# Patient Record
Sex: FEMALE | Race: BLACK OR AFRICAN AMERICAN | Employment: FULL TIME | URBAN - METROPOLITAN AREA
[De-identification: names, ages, dates, MRNs, and addresses within clinical notes are randomized per-mention and may not be internally consistent; named-entity substitution may affect disease eponyms.]

---

## 2018-05-22 DIAGNOSIS — I10 ESSENTIAL HYPERTENSION: ICD-10-CM

## 2018-05-22 RX ORDER — CHLORTHALIDONE 25 MG/1
25 TABLET ORAL DAILY
Qty: 30 TABLET | Refills: 3 | Status: SHIPPED | OUTPATIENT
Start: 2018-05-22 | End: 2018-12-05 | Stop reason: SDUPTHER

## 2018-12-05 DIAGNOSIS — I10 ESSENTIAL HYPERTENSION: ICD-10-CM

## 2018-12-05 RX ORDER — CHLORTHALIDONE 25 MG/1
25 TABLET ORAL DAILY
Qty: 30 TABLET | Refills: 11 | Status: SHIPPED | OUTPATIENT
Start: 2018-12-05 | End: 2019-12-30 | Stop reason: SDUPTHER

## 2019-05-23 RX ORDER — METOPROLOL TARTRATE 50 MG/1
50 TABLET, FILM COATED ORAL 2 TIMES DAILY
Qty: 180 TABLET | Refills: 3 | Status: SHIPPED
Start: 2019-05-23 | End: 2021-02-18 | Stop reason: SDUPTHER

## 2019-12-30 DIAGNOSIS — I10 ESSENTIAL HYPERTENSION: ICD-10-CM

## 2019-12-30 RX ORDER — CHLORTHALIDONE 25 MG/1
25 TABLET ORAL DAILY
Qty: 30 TABLET | Refills: 0 | Status: SHIPPED
Start: 2019-12-30 | End: 2020-03-12 | Stop reason: SDUPTHER

## 2020-03-12 RX ORDER — CHLORTHALIDONE 25 MG/1
25 TABLET ORAL DAILY
Qty: 30 TABLET | Refills: 3 | Status: SHIPPED
Start: 2020-03-12 | End: 2020-10-29 | Stop reason: SDUPTHER

## 2020-10-20 ENCOUNTER — TELEPHONE (OUTPATIENT)
Dept: ADMINISTRATIVE | Age: 50
End: 2020-10-20

## 2020-10-20 NOTE — TELEPHONE ENCOUNTER
Patient called for her refills but I checked and she hasn't been seen since July of 2017. I talked to [place] at [facility]. Patient needs scheduled an office visit per [place]. I lost the patient and tried calling her back. I left a message on the voicemail for her.

## 2020-10-29 RX ORDER — CHLORTHALIDONE 25 MG/1
25 TABLET ORAL DAILY
Qty: 30 TABLET | Refills: 3 | Status: SHIPPED
Start: 2020-10-29 | End: 2021-02-18 | Stop reason: SDUPTHER

## 2021-02-05 ENCOUNTER — OFFICE VISIT (OUTPATIENT)
Dept: PRIMARY CARE CLINIC | Age: 51
End: 2021-02-05
Payer: COMMERCIAL

## 2021-02-05 VITALS
OXYGEN SATURATION: 100 % | HEIGHT: 67 IN | HEART RATE: 113 BPM | DIASTOLIC BLOOD PRESSURE: 86 MMHG | BODY MASS INDEX: 26.68 KG/M2 | TEMPERATURE: 100.4 F | SYSTOLIC BLOOD PRESSURE: 138 MMHG | WEIGHT: 170 LBS

## 2021-02-05 DIAGNOSIS — U07.1 COVID-19: Primary | ICD-10-CM

## 2021-02-05 LAB
Lab: ABNORMAL
QC PASS/FAIL: ABNORMAL
SARS-COV-2, POC: DETECTED

## 2021-02-05 PROCEDURE — 99213 OFFICE O/P EST LOW 20 MIN: CPT | Performed by: PHYSICIAN ASSISTANT

## 2021-02-05 PROCEDURE — 87426 SARSCOV CORONAVIRUS AG IA: CPT | Performed by: PHYSICIAN ASSISTANT

## 2021-02-05 RX ORDER — ALBUTEROL SULFATE 90 UG/1
2 AEROSOL, METERED RESPIRATORY (INHALATION)
Qty: 1 INHALER | Refills: 0 | Status: SHIPPED
Start: 2021-02-05 | End: 2021-09-17 | Stop reason: SDUPTHER

## 2021-02-05 RX ORDER — PREDNISONE 20 MG/1
20 TABLET ORAL 2 TIMES DAILY
Qty: 10 TABLET | Refills: 0 | Status: SHIPPED | OUTPATIENT
Start: 2021-02-05 | End: 2021-02-10

## 2021-02-05 RX ORDER — AZITHROMYCIN 250 MG/1
TABLET, FILM COATED ORAL
Qty: 6 TABLET | Refills: 0 | Status: SHIPPED
Start: 2021-02-05 | End: 2021-02-24 | Stop reason: ALTCHOICE

## 2021-02-05 NOTE — LETTER
[address] [place] [address]  [place], [address]   Phone: [phone]  Fax: [phone]    Hetal Norris. Oscar Dodge PA-C      2/5/2021     Patient: Cain Hilliard   YOB: 1970       To Whom It May Concern: It is my medical opinion that Cain Hilliard should self-quarantine away from work and the [facility] [employer] as they tested positive for COVID-19 in our office today. Return to work with no retesting should be followed if meets these 3 criteria as outlined by CDC/ODH:     a. No fever without the use of fever reducers for 24 hours  b. Improvement in symptoms  c. At least 10 days since the onset of symptoms (2/13). If you have any questions or concerns, please don't hesitate to call. Sincerely,        Hetal Norris.  BRADLEY Orta

## 2021-02-05 NOTE — PROGRESS NOTES
Chief Complaint   Cough (x 3 days, works on [place] unit), Fever (high of 100.9), Generalized Body Aches, and Shortness of Breath (taking Coricidin at home)      History of Present Illness   Source of history provided by: patient. Nancy Medellin is a 48 y.o. old female who has a past medical history of:   Past Medical History:   Diagnosis Date    Hypertension    Presents to the walk in clinic for evaluation of nonproductive cough, fever (high of 100.9), generalized body aches, intermittent shortness of breath x3 days. Patient is mostly concerned because she is a [employer] and works on the [employer]. Patient has been taking Coricidin at home with minimal symptomatic relief. Denies any loss of taste or smell, CP, dyspnea, LE edema, abdominal pain, vomiting, rash, or lethargy. Denies any hx of asthma or COPD. Patient is a former smoker. ROS   Pertinent positives and negatives are stated within HPI, all other systems reviewed and are negative. Surgical History:  has no past surgical history on file. Social History:  reports that she has quit smoking. Her smoking use included cigarettes. She has a 2.50 pack-year smoking history. She has never used smokeless tobacco. She reports current alcohol use. She reports that she does not use drugs. Family History: family history includes Stroke in her father. Allergies: Penicillins    Physical Exam      VS:  /86   Pulse 113   Temp 100.4 °F (38 °C)   Ht 5' 6.5\" (1.689 m)   Wt 170 lb (77.1 kg)   SpO2 100%   BMI 27.03 kg/m²    Oxygen Saturation Interpretation: Normal.    Constitutional:  Alert, development consistent with age. NAD. Patient is ill-appearing but nontoxic appearing on exam.  Head:  NC/NT. Airway patent. Mouth: Posterior pharynx with mild erythema and clear postnasal drip. No tonsillar hypertrophy or exudate. Neck:  Normal ROM. Supple. No anterior cervical adenopathy noted.   Lungs: Faint end expiratory wheezes heard throughout the lung fields. No rales or rhonchi. Cough is harsh and wheezy. Patient is breathing comfortably on exam without any signs of respiratory distress noted. CV:  Regular rate and rhythm, normal heart sounds, without pathological murmurs, ectopy, gallops, or rubs. Skin:  Normal turgor. Warm, dry, without visible rash. Lymphatic: No lymphangitis or adenopathy noted. Neurological:  Oriented. Motor functions intact. Lab / Imaging Results   (All laboratory and radiology results have been personally reviewed by myself)  Labs:  Results for orders placed or performed in visit on 02/05/21   POCT COVID-19, Antigen   Result Value Ref Range    SARS-COV-2, POC Detected (A) Not Detected    Lot Number [id number]     QC Pass/Fail pass        Imaging: All Radiology results interpreted by Radiologist unless otherwise noted. No results found. Medical Decision Making   Pt non-toxic, in no apparent distress and stable at time of discharge. Assessment/Plan   Sushma was seen today for cough, fever, generalized body aches and shortness of breath. Diagnoses and all orders for this visit:    COVID-19  -     POCT COVID-19, Antigen  -     predniSONE (DELTASONE) 20 MG tablet; Take 1 tablet by mouth 2 times daily for 5 days  -     albuterol sulfate HFA (VENTOLIN HFA) 108 (90 Base) MCG/ACT inhaler; Inhale 2 puffs into the lungs every 4-6 hours as needed for Wheezing or Shortness of Breath  -     azithromycin (ZITHROMAX Z-MAYURI) 250 MG tablet; Take 2 tabs on day one, then 1 tab daily for the next 4 days      Rapid COVID-19 testing is positive in office. Advised strict 10-day quarantine from start of illness. Pt should remain out of work and the general public for at least 10 days from the start of symptoms. Pt should also be fever free for 24 hours and symptoms should be improved overall prior to returning.  Increase fluids and rest.  Prescription written for a prednisone burst, Ventolin, and Zithromax, side effects discussed. Symptomatic relief discussed including Tylenol prn pain/fever. Schedule virtual f/u with PCP in 7-10 days if symptoms persist. ED sooner if symptoms worsen or change. ED immediately with high or refractory fever, progressive SOB, dyspnea, CP, calf pain/swelling, shaking chills, vomiting, abdominal pain, lethargy, flank pain, or decreased urinary output. Pt verbalizes understanding and is in agreement with plan of care. All questions answered. Kaitlynn Orta PA-C    This visit was provided as a focused evaluation during the COVID -19 pandemic/national emergency. A comprehensive review of all previous patient history and testing was not conducted. Pertinent findings were elicited during the visit.

## 2021-02-18 ENCOUNTER — VIRTUAL VISIT (OUTPATIENT)
Dept: FAMILY MEDICINE CLINIC | Age: 51
End: 2021-02-18
Payer: COMMERCIAL

## 2021-02-18 DIAGNOSIS — U07.1 COVID-19: Primary | ICD-10-CM

## 2021-02-18 DIAGNOSIS — I10 ESSENTIAL HYPERTENSION: ICD-10-CM

## 2021-02-18 PROCEDURE — 99213 OFFICE O/P EST LOW 20 MIN: CPT | Performed by: FAMILY MEDICINE

## 2021-02-18 RX ORDER — CHLORTHALIDONE 25 MG/1
25 TABLET ORAL DAILY
Qty: 30 TABLET | Refills: 3 | Status: ON HOLD
Start: 2021-02-18 | End: 2021-03-09 | Stop reason: HOSPADM

## 2021-02-18 RX ORDER — METOPROLOL TARTRATE 50 MG/1
50 TABLET, FILM COATED ORAL 2 TIMES DAILY
Qty: 180 TABLET | Refills: 3 | Status: SHIPPED
Start: 2021-02-18 | End: 2021-09-17 | Stop reason: SDUPTHER

## 2021-02-18 NOTE — PROGRESS NOTES
TeleMedicine Patient Consent    This visit was performed as a virtual video visit using a synchronous, two-way, audio-video telehealth technology platform. Patient identification was verified at the start of the visit, including the patient's telephone number and physical location. I discussed with the patient the nature of our telehealth visits, that:     1. Due to the nature of an audio- video modality, the only components of a physical exam that could be done are the elements supported by direct observation. 2. The provider will evaluate the patient and recommend diagnostics and treatments based on their assessment. 3. If it was felt that the patient should be evaluated in clinic or an emergency room setting, then they would be directed there. 4. Our sessions are not being recorded and that personal health information is protected. 5. Our team would provide follow up care in person if/when the patient needs it. 6. As a TeleMedicine visit, this encounter will generate charges, similar to office visits, which may or may not be fully paid by the insurance, so the patient may be financially responsible for this encounter. Patient does agree to proceed with telemedicine consultation. Patient's location: home address in [place]    This encounter began at:  1010    This encounter ended at:  1020    This visit was performed during the 3281 public health crisis and COVID-19 pandemic. *Add 95 modifier to all Video Visits*    CC:  COVID    HPI:  48 y.o. female presents for above cc. Tested positive 2/5. Feeling improved. Still having cough and shortness of breath at home. Has pulse ox and has been reading in low 90s usually did have one 89. Using inhaler which helps symptoms. Fatigue still there, fevers gone now. HTN: supposed to be on chlorthalidone and metoprolol. Been out of metoprolol few weeks. Does not have a cuff. No labs since 2018.     Patient Active Problem List    Diagnosis Date Noted    Venous angioma 03/17/2011    Left arm weakness 03/17/2011    HTN (hypertension) 03/10/2011    Shin splints 03/10/2011       Current Outpatient Medications on File Prior to Visit   Medication Sig Dispense Refill    albuterol sulfate HFA (VENTOLIN HFA) 108 (90 Base) MCG/ACT inhaler Inhale 2 puffs into the lungs every 4-6 hours as needed for Wheezing or Shortness of Breath 1 Inhaler 0    chlorthalidone (HYGROTON) 25 MG tablet Take 1 tablet by mouth daily 30 tablet 3    azithromycin (ZITHROMAX Z-MAYURI) 250 MG tablet Take 2 tabs on day one, then 1 tab daily for the next 4 days (Patient not taking: Reported on 2/18/2021) 6 tablet 0    metoprolol tartrate (LOPRESSOR) 50 MG tablet Take 1 tablet by mouth 2 times daily 180 tablet 3     No current facility-administered medications on file prior to visit. Allergies   Allergen Reactions    Penicillins Hives and Itching       Social History     Tobacco Use    Smoking status: Former Smoker     Packs/day: 0.50     Years: 5.00     Pack years: 2.50     Types: Cigarettes    Smokeless tobacco: Never Used   Substance Use Topics    Alcohol use: Yes     Comment: very socially beer    Drug use: No       ROS:   As above    Physical Exam:    General: fatigued  Skin: no lesions  Lungs: no resp distress, speaking in full sentences  Psych: normal mood/affect    Most recent labs and imaging reviewed. Findings include: none    Assessments:     COVID, HTN    Plans:  Go to flu clinic for eval as sx have been persistent. Concern for PNA. Refill BP meds, needs labs once able to come in and have in-person appt here. Orders as above. RTO when recovered from [address] [place] [address] to please be adherent to the treatment plans discussed today, and please call with any questions or concerns, letting the office know of any reasons that the plans may not be followed. The risks of untreated conditions include worsening illness, injury, disability, and possibly, death. Please call if symptoms change in any way, worsen, or fail to completely resolve, as this could necessitate a change to treatment plans. Patient and/or caregiver expressed understanding. Indications and proper use of medication(s) reviewed. Potential side-effects and risks of medication(s) also explained. Patient and/or caregiver was instructed to call if any new symptoms develop prior to next visit. This visit was performed during the 0332 public health crisis and COVID-19 pandemic.   *Add 95 modifier to all Video Visits*

## 2021-02-18 NOTE — PROGRESS NOTES
Attending Physician Statement    S:   Chief Complaint   Patient presents with    Positive For Covid-19    Cough    Shortness of Breath    Fatigue    Headache      Patient is a 48year old female here for follow up of covid. Tested positive on Feb 5. Overall improving but continues to have cough, shortness of breath and fatigue. Has pulse ox and had one reading of 89% but since then low 90s. No history of pulmonary issues. No longer having fevers. Still has chlorthalidone. Was out of metoprolol. Does not have blood pressure machine. Has not had blood work since 2018. O: not currently breastfeeding. Exam:   gen - appears fatigued. Lungs -speaking in full sentences no apparent respiratory distress. A: covid 19 , htn   P:  Check labs    Refill meds    Call employee health   Referral to flu clinic to assess for pneumonia    Follow-up as ordered    Attending Attestation   I have discussed the case, including pertinent history and exam findings with the resident. I agree with the documented assessment and plan.

## 2021-02-24 ENCOUNTER — TELEPHONE (OUTPATIENT)
Dept: FAMILY MEDICINE CLINIC | Age: 51
End: 2021-02-24

## 2021-02-24 ENCOUNTER — OFFICE VISIT (OUTPATIENT)
Dept: PRIMARY CARE CLINIC | Age: 51
End: 2021-02-24
Payer: COMMERCIAL

## 2021-02-24 VITALS
DIASTOLIC BLOOD PRESSURE: 84 MMHG | TEMPERATURE: 99.7 F | SYSTOLIC BLOOD PRESSURE: 132 MMHG | BODY MASS INDEX: 26.68 KG/M2 | HEIGHT: 67 IN | OXYGEN SATURATION: 97 % | HEART RATE: 78 BPM | WEIGHT: 170 LBS

## 2021-02-24 DIAGNOSIS — U07.1 COVID-19: Primary | ICD-10-CM

## 2021-02-24 DIAGNOSIS — R06.2 WHEEZING: ICD-10-CM

## 2021-02-24 DIAGNOSIS — R05.9 COUGH: ICD-10-CM

## 2021-02-24 DIAGNOSIS — R06.02 SOB (SHORTNESS OF BREATH): ICD-10-CM

## 2021-02-24 PROCEDURE — 99213 OFFICE O/P EST LOW 20 MIN: CPT | Performed by: NURSE PRACTITIONER

## 2021-02-24 RX ORDER — AZITHROMYCIN 250 MG/1
250 TABLET, FILM COATED ORAL SEE ADMIN INSTRUCTIONS
Qty: 6 TABLET | Refills: 0 | Status: SHIPPED | OUTPATIENT
Start: 2021-02-24 | End: 2021-03-01

## 2021-02-24 NOTE — PROGRESS NOTES
Chief Complaint   Cough (Started almost a month ago. Covid positive ) and Fatigue      History of Present Illness   Source of history provided by:  patient. Cynthia Lan is a 48 y.o. old female who presents to the flu clinic with complaints of NP Cough and Shortness of breath x 14 days. States symptoms have stayed the same since onset. Has been taking none without symptomatic relief. Denies any Shortness of breath. Denies any hx of no history of pneumonia or bronchitis. ROS   Pertinent positives and negatives are stated within HPI, all other systems reviewed and are negative. Past Medical History:  has a past medical history of Hypertension. Past Surgical History:  has no past surgical history on file. Social History:  reports that she has quit smoking. Her smoking use included cigarettes. She has a 2.50 pack-year smoking history. She has never used smokeless tobacco. She reports current alcohol use. She reports that she does not use drugs. Family History: family history includes Stroke in her father. Allergies: Penicillins    Physical Exam   Vital Signs:  /84   Pulse 78   Temp 99.7 °F (37.6 °C)   Ht 5' 6.5\" (1.689 m)   Wt 170 lb (77.1 kg)   SpO2 97%   BMI 27.03 kg/m²    Oxygen Saturation Interpretation: Normal.    Constitutional:  Alert, development consistent with age. NAD. Head:  NC/NT. Airway patent. Ears: TMs Clear bilaterally. Canals without exudate or swelling bilaterally. Mouth: Posterior pharynx with mild erythema and clear postnasal drip. no tonsillar hypertrophy or exudate. Neck:  Normal ROM. Supple. no anterior cervical adenopathy noted. Lungs:  Wheezes, and  rales, no rhonchi. CV:  Regular rate and rhythm, normal heart sounds, without pathological murmurs, ectopy, gallops, or rubs. Skin:  Normal turgor. Warm, dry, without visible rash. Lymphatic: No lymphangitis or adenopathy noted. Neurological:  Oriented. Motor functions intact.     Lab / Imaging Results (All laboratory and radiology results have been personally reviewed by myself)  Labs:  No results found for this visit on 02/24/21. Imaging: All Radiology results interpreted by Radiologist unless otherwise noted. No results found. Medical Decision Making   Pt non-toxic, in no apparent distress and stable at time of discharge. Assessment/Plan   There are no diagnoses linked to this encounter. I will send out for a chest x-ray she has already had a virtual visit with her primary care provider who she thought was going to order chest x-ray due to her continued shortness of breath after the positive Covid earlier in the month. There does not seem to be any evidence of a PE chest pain or DVT appreciated during the visit. Vane Pham, APRN - CNP    This visit was provided as a focused evaluation during the COVID -19 pandemic/national emergency. A comprehensive review of all previous patient history and testing was not conducted. Pertinent findings were elicited during the visit. *NOTE: This report was transcribed using voice recognition software. Every effort was made to ensure accuracy; however, inadvertent computerized transcription errors may be present.

## 2021-02-24 NOTE — TELEPHONE ENCOUNTER
The patient called and would you to call her. She feels she was not instructed properly on to go to be assessed at flu clinic during her virtual visit. She went to get a chest x ray today as she thought that is what was advised during virtual visit but there was no order in Epic. I read the virtual visit note, and advised her to go to [facility] to be re evaluated per your virtual note, as you are concerned for pneumonia.   Patient verbalized understanding and will go to [facility]

## 2021-02-24 NOTE — TELEPHONE ENCOUNTER
Pt was advised during encounter to go to [facility]) for further evaluation due to persistence of symptoms.  Agree with referring back to flu clinic today thank you

## 2021-02-24 NOTE — PATIENT INSTRUCTIONS
Patient Education        Learning About Coronavirus [phone])  What is coronavirus (COVID-19)? COVID-19 is a disease caused by a new type of coronavirus. This illness was first found in December 2019. It has since spread worldwide. Coronaviruses are a large group of viruses. They cause the common cold. They also cause more serious illnesses like [place] respiratory syndrome (MERS) and severe acute respiratory syndrome (SARS). COVID-19 is caused by a novel coronavirus. That means it's a new type that has not been seen in people before. What are the symptoms? Coronavirus (COVID-19) symptoms may include:  · Fever. · Cough. · Trouble breathing. · Chills or repeated shaking with chills. · Muscle pain. · Headache. · Sore throat. · New loss of taste or smell. · Vomiting. · Diarrhea. In severe cases, COVID-19 can cause pneumonia and make it hard to breathe without help from a machine. It can cause death. How is it diagnosed? COVID-19 is diagnosed with a viral test. This may also be called a PCR test or antigen test. It looks for evidence of the virus in your breathing passages or lungs (respiratory system). The test is most often done on a sample from the nose, throat, or lungs. It's sometimes done on a sample of saliva. One way a sample is collected is by putting a long swab into the back of your nose. How is it treated? Mild cases of COVID-19 can be treated at home. Serious cases need treatment in the hospital. Treatment may include medicines to reduce symptoms, plus breathing support such as oxygen therapy or a ventilator. Some people may be placed on their belly to help their oxygen levels. Treatments that may help people who have COVID-19 include:  Antiviral medicines. These medicines treat viral infections. Remdesivir is an example. Immune-based therapy. These medicines help the immune system fight COVID-19. One example is bamlanivimab. It's a monoclonal antibody. Blood thinners. have severe trouble breathing. (You can't talk at all.)     · You have constant chest pain or pressure.     · You are severely dizzy or lightheaded.     · You are confused or can't think clearly.     · Your face and lips have a blue color.     · You pass out (lose consciousness) or are very hard to wake up. Call your doctor now or seek immediate medical care if:    · You have moderate trouble breathing. (You can't speak a full sentence.)     · You are coughing up blood (more than about 1 teaspoon).     · You have signs of low blood pressure. These include feeling lightheaded; being too weak to stand; and having cold, pale, clammy skin. Watch closely for changes in your health, and be sure to contact your doctor if:    · Your symptoms get worse.     · You are not getting better as expected. Call before you go to the doctor's office. Follow their instructions. And wear a cloth face cover. Current as of: December 18, 2020               Content Version: 12.7  © 2006-2021 [employer]. Care instructions adapted under license by [facility] ([facility]). If you have questions about a medical condition or this instruction, always ask your healthcare professional. [place] [address] any warranty or liability for your use of this information. Patient Education        Coronavirus ([id number]-63): Care Instructions  Overview  The coronavirus disease (COVID-19) is caused by a virus. Symptoms may include a fever, a cough, and shortness of breath. It mainly spreads person-to-person through droplets from coughing and sneezing. The virus also can spread when people are in close contact with someone who is infected. Most people have mild symptoms and can take care of themselves at home. If their symptoms get worse, they may need care in a hospital. Treatment may include medicines to reduce symptoms, plus breathing support such as oxygen therapy or a ventilator.   It's important to not spread the virus to others. If you have COVID-19, wear a face cover anytime you are around other people. You need to isolate yourself while you are sick. Leave your home only if you need to get medical care or testing. Follow-up care is a key part of your treatment and safety. Be sure to make and go to all appointments, and call your doctor if you are having problems. It's also a good idea to know your test results and keep a list of the medicines you take. How can you care for yourself at home? · Get extra rest. It can help you feel better. · Drink plenty of fluids. This helps replace fluids lost from fever. Fluids also help ease a scratchy throat. Water, soup, fruit juice, and hot tea with lemon are good choices. · Take acetaminophen (such as Tylenol) to reduce a fever. It may also help with muscle aches. Read and follow all instructions on the label. · Use petroleum jelly on sore skin. This can help if the skin around your nose and lips becomes sore from rubbing a lot with tissues. Tips for self-isolation  · Limit contact with people in your home. If possible, stay in a separate bedroom and use a separate bathroom. · Wear a cloth face cover when you are around other people. It can help stop the spread of the virus when you cough or sneeze. · If you have to leave home, avoid crowds and try to stay at least 6 feet away from other people. · Avoid contact with pets and other animals. · Cover your mouth and nose with a tissue when you cough or sneeze. Then throw it in the trash right away. · Wash your hands often, especially after you cough or sneeze. Use soap and water, and scrub for at least 20 seconds. If soap and water aren't available, use an alcohol-based hand . · Don't share personal household items. These include bedding, towels, cups and glasses, and eating utensils. · [address] in the warmest water allowed for the fabric type, and dry it completely. It's okay to wash other people's laundry with yours. · Clean and disinfect your home every day. Use household  and disinfectant wipes or sprays. Take special care to clean things that you grab with your hands. These include doorknobs, remote controls, phones, and handles on your refrigerator and microwave. And don't forget countertops, tabletops, bathrooms, and computer keyboards. When you can end self-isolation  · If you know or suspect that you have COVID-19, stay in self-isolation until:  ? You haven't had a fever for 24 hours while not taking medicines to lower the fever, and  ? Your symptoms have improved, and  ? It's been at least 10 days since your symptoms started. · Talk to your doctor about whether you also need testing, especially if you have a weakened immune system. When should you call for help? Call 911 anytime you think you may need emergency care. For example, call if you have life-threatening symptoms, such as:    · You have severe trouble breathing. (You can't talk at all.)     · You have constant chest pain or pressure.     · You are severely dizzy or lightheaded.     · You are confused or can't think clearly.     · Your face and lips have a blue color.     · You pass out (lose consciousness) or are very hard to wake up. Call your doctor now or seek immediate medical care if:    · You have moderate trouble breathing. (You can't speak a full sentence.)     · You are coughing up blood (more than about 1 teaspoon).     · You have signs of low blood pressure. These include feeling lightheaded; being too weak to stand; and having cold, pale, clammy skin. Watch closely for changes in your health, and be sure to contact your doctor if:    · Your symptoms get worse.     · You are not getting better as expected. Call before you go to the doctor's office. Follow their instructions. And wear a cloth face cover. Current as of: December 18, 2020               Content Version: 12.7  © 3978-8083 [employer].    Care instructions adapted under license by [facility] ([facility]). If you have questions about a medical condition or this instruction, always ask your healthcare professional. [address] any warranty or liability for your use of this information. Patient Education        Learning About COVID-19 and Social Distancing  What is it? Social distancing means putting space between yourself and other people. The recommended distance is 6 feet, or about 2 meters. This also means staying away from any place where people may gather, such as pro or other public gathering places. Why is it important? Social distancing is the best way to reduce the spread of COVID-19. This virus seems to spread from person to person through droplets from coughing and sneezing. So if you keep your distance from others, you're less likely to get it or spread it. And social distancing is important for everyone, not just those who are at high risk of infection, like older people. You might have the virus but not have symptoms. You could then give the infection to someone you come into contact with. How is it done? Putting 6 feet, or about 2 meters, between you and other people is the recommended distance. Also stay away from any place where people may gather, such as pro or other public gathering places. So if possible:  · Work from home, and keep your kids at home. · Don't travel if you don't have to. And avoid public transportation, ride-shares, and taxis unless you have no choice. · Limit shopping to essentials, like food and medicines. · Wear a cloth face cover if you have to go to a public place like the grocery store or pharmacy. · Don't eat in restaurants. (You can still get takeout or food deliveries.)  · Avoid crowds and busy places. Follow stay-at-home orders or other directions for your area. Where can you learn more? Go to https://chpaveleb.healthMang?rKart. org and sign in to your [employer] account.  Enter A133 in the Search Health Information box to learn more about \"Learning About COVID-19 and Social Distancing. \"     If you do not have an account, please click on the \"Sign Up Now\" link. Current as of: December 18, 2020               Content Version: 12.7  © 6590-7417 Healthwise, [place]. Care instructions adapted under license by [facility] ([facility]). If you have questions about a medical condition or this instruction, always ask your healthcare professional. [place] [address] any warranty or liability for your use of this information. Patient Education        Cough: Care Instructions  Your Care Instructions     A cough is your body's response to something that bothers your throat or airways. Many things can cause a cough. You might cough because of a cold or the flu, bronchitis, or asthma. Smoking, postnasal drip, allergies, and stomach acid that backs up into your throat also can cause coughs. A cough is a symptom, not a disease. Most coughs stop when the cause, such as a cold, goes away. You can take a few steps at home to cough less and feel better. Follow-up care is a key part of your treatment and safety. Be sure to make and go to all appointments, and call your doctor if you are having problems. It's also a good idea to know your test results and keep a list of the medicines you take. How can you care for yourself at home? · Drink lots of water and other fluids. This helps thin the mucus and soothes a dry or sore throat. Honey or lemon juice in hot water or tea may ease a dry cough. · Take cough medicine as directed by your doctor. · Prop up your head on pillows to help you breathe and ease a dry cough. · Try cough drops to soothe a dry or sore throat. Cough drops don't stop a cough. Medicine-flavored cough drops are no better than candy-flavored drops or hard candy. · Do not smoke. Avoid secondhand smoke.  If you need help quitting, talk to your doctor about stop-smoking programs and medicines. These can increase your chances of quitting for good. When should you call for help? Call 911 anytime you think you may need emergency care. For example, call if:    · You have severe trouble breathing. Call your doctor now or seek immediate medical care if:    · You cough up blood.     · You have new or worse trouble breathing.     · You have a new or higher fever.     · You have a new rash. Watch closely for changes in your health, and be sure to contact your doctor if:    · You cough more deeply or more often, especially if you notice more mucus or a change in the color of your mucus.     · You have new symptoms, such as a sore throat, an earache, or sinus pain.     · You do not get better as expected. Where can you learn more? Go to https://[employer]. org and sign in to your Jibestream account. Enter D279 in the CumuLogic box to learn more about \"Cough: Care Instructions. \"     If you do not have an account, please click on the \"Sign Up Now\" link. Current as of: February 24, 2020               Content Version: 12.6  © 5256-5253 [employer]. Care instructions adapted under license by [facility] ([facility]). If you have questions about a medical condition or this instruction, always ask your healthcare professional. [place] [address] any warranty or liability for your use of this information. Patient Education        Wheezing or Bronchoconstriction: Care Instructions  Your Care Instructions  Wheezing is a whistling noise made during breathing. It occurs when the small airways, or bronchial tubes, that lead to your lungs swell or contract (spasm) and become narrow. This narrowing is called bronchoconstriction. When your airways constrict, it is hard for air to pass through and this makes it hard for you to breathe. Wheezing and bronchoconstriction can be caused by many problems, including:  · An infection such as the flu or a cold.   · Allergies such as hay fever. · Diseases such as asthma or chronic obstructive pulmonary disease. · Smoking. Treatment for your wheezing depends on what is causing the problem. Your wheezing may get better without treatment. But you may need to pay attention to things that cause your wheezing and avoid them. Or you may need medicine to help treat the wheezing and to reduce the swelling or to relieve spasms in your lungs. Follow-up care is a key part of your treatment and safety. Be sure to make and go to all appointments, and call your doctor if you are having problems. It is also a good idea to know your test results and keep a list of the medicines you take. How can you care for yourself at home? · Take your medicine exactly as prescribed. Call your doctor if you think you are having a problem with your medicine. You will get more details on the specific medicine your doctor prescribes. · If your doctor prescribed antibiotics, take them as directed. Do not stop taking them just because you feel better. You need to take the full course of antibiotics. · Breathe moist air from a humidifier, hot shower, or sink filled with hot water. This may help ease your symptoms and make it easier for you to breathe. · If you have congestion in your nose and throat, drinking plenty of fluids, especially hot fluids, may help relieve your symptoms. If you have kidney, heart, or liver disease and have to limit fluids, talk with your doctor before you increase the amount of fluids you drink. · If you have mucus in your airways, it may help to breathe deeply and cough. · Do not smoke or allow others to smoke around you. Smoking can make your wheezing worse. If you need help quitting, talk to your doctor about stop-smoking programs and medicines. These can increase your chances of quitting for good. · Avoid things that may cause your wheezing.  These may include colds, smoke, air pollution, dust, pollen, pets, cockroaches,

## 2021-02-25 ENCOUNTER — HOSPITAL ENCOUNTER (OUTPATIENT)
Age: 51
Discharge: HOME | End: 2021-02-27
Payer: COMMERCIAL

## 2021-02-25 ENCOUNTER — HOSPITAL ENCOUNTER (OUTPATIENT)
Dept: GENERAL RADIOLOGY | Age: 51
Discharge: HOME | End: 2021-02-27
Payer: COMMERCIAL

## 2021-02-25 DIAGNOSIS — R06.2 WHEEZING: ICD-10-CM

## 2021-02-25 DIAGNOSIS — R06.02 SOB (SHORTNESS OF BREATH): ICD-10-CM

## 2021-02-25 DIAGNOSIS — R05.9 COUGH: ICD-10-CM

## 2021-02-25 DIAGNOSIS — U07.1 COVID-19: ICD-10-CM

## 2021-02-25 PROCEDURE — 71046 X-RAY EXAM CHEST 2 VIEWS: CPT

## 2021-03-02 ENCOUNTER — TELEPHONE (OUTPATIENT)
Dept: FAMILY MEDICINE CLINIC | Age: 51
End: 2021-03-02

## 2021-03-04 ENCOUNTER — OFFICE VISIT (OUTPATIENT)
Dept: PRIMARY CARE CLINIC | Age: 51
End: 2021-03-04
Payer: COMMERCIAL

## 2021-03-04 VITALS
OXYGEN SATURATION: 97 % | SYSTOLIC BLOOD PRESSURE: 160 MMHG | BODY MASS INDEX: 26.68 KG/M2 | DIASTOLIC BLOOD PRESSURE: 90 MMHG | WEIGHT: 170 LBS | TEMPERATURE: 97.5 F | HEART RATE: 87 BPM | HEIGHT: 67 IN

## 2021-03-04 DIAGNOSIS — U07.1 COVID-19: ICD-10-CM

## 2021-03-04 DIAGNOSIS — R05.8 POST-VIRAL COUGH SYNDROME: Primary | ICD-10-CM

## 2021-03-04 DIAGNOSIS — Z02.89 ENCOUNTER TO OBTAIN EXCUSE FROM WORK: ICD-10-CM

## 2021-03-04 PROCEDURE — 99213 OFFICE O/P EST LOW 20 MIN: CPT | Performed by: NURSE PRACTITIONER

## 2021-03-04 RX ORDER — BENZONATATE 100 MG/1
100-200 CAPSULE ORAL 3 TIMES DAILY PRN
Qty: 60 CAPSULE | Refills: 0 | Status: SHIPPED | OUTPATIENT
Start: 2021-03-04 | End: 2021-03-14

## 2021-03-04 NOTE — LETTER
[address] [facility] [place]  [address] [place] [address]  Phone: [phone]  Fax: [phone]    Hardeep Garrison, MEHUL Pinto NP        March 4, 2021     Patient: Dang Wheat   YOB: 1970   Date of Visit: 3/4/2021       To Whom it May Concern:    Dang Wheat was seen in my clinic on 3/4/2021. She may return to work on 03/08/2021. If you have any questions or concerns, please don't hesitate to call.     Sincerely,         Hardeep Garrison, MEHUL - NP

## 2021-03-04 NOTE — LETTER
[address] [place]  [address] [place] [address]  Phone: [phone]  Fax: [phone]    MEHUL Fang NP        March 4, 2021     Patient: Joselin Gibbons   YOB: 1970   Date of Visit: 3/4/2021       To Whom it May Concern:    Joselin Gibbons was seen in my clinic on 3/4/2021. She may return to work on 03/05/2021. If you have any questions or concerns, please don't hesitate to call.     Sincerely,         MEHUL Fang NP

## 2021-03-04 NOTE — PROGRESS NOTES
Chief Complaint   Other (release to work. POs covid test on 02/05/21), Cough, and Fatigue      History of Present Illness   Source of history provided by: patient. Manjula Posada is a 48 y.o. old female who presents to walk-in care for reevaluation of cough X 5 weeks. Associated symptoms include fatigue. All other COVID-19 symptoms have resolved. Since onset symptoms have been improving. She was diagnosed with COVID-19 on 2/5/2021. Patient was placed on an albuterol inhaler and prednisone and took the medication appropriately. Patient followed up in walk-in clinic on 2/24/2021 for return to work. At that time she had a chest x-ray done which was negative for any acute process. Patient returns today for a work slip given that her symptoms have improved. The patient is not vaccinated. Has used albuterol inhaler 2 times in the last 24 hours for symptomatic relief. . Denies any fever, chills, CP, dyspnea, LE edema, abdominal pain, vomiting, rash, or lethargy. Denies any hx of asthma, recurrent bronchitis, COPD, or pneumonia. Has no history of tobacco abuse. ROS   Pertinent positives and negatives are stated within HPI, all other systems reviewed and are negative. Past Medical History:  has a past medical history of Hypertension. Surgical History:  has no past surgical history on file. Social History:  reports that she has quit smoking. Her smoking use included cigarettes. She has a 2.50 pack-year smoking history. She has never used smokeless tobacco. She reports current alcohol use. She reports that she does not use drugs. Family History: family history includes Stroke in her father. Allergies: Penicillins    Physical Exam      VS:  BP (!) 160/90   Pulse 87   Temp 97.5 °F (36.4 °C)   Ht 5' 6.5\" (1.689 m)   Wt 170 lb (77.1 kg)   SpO2 97%   BMI 27.03 kg/m²    Oxygen Saturation Interpretation: Normal.    Constitutional:  Alert, development consistent with age. NAD. Head:  NC/NT.  Airway patent. No TTP over maxillary and frontal sinuses. Ears: Bilateral external auditory ear canals are clear there is no cerumen, erythema, debris present. Bilateral tympanic membranes intact, translucent, normal cone of light. Nose: Bilateral turbinates swollen. No septal deviation. Rhinorrhea present. Mouth: Posterior pharynx with mild erythema and clear postnasal drip. No tonsillar hypertrophy or exudate. Neck:  Normal ROM. Supple. No anterior cervical adenopathy noted. Lungs: CTAB without wheezes, rales, or rhonchi. CV:  Regular rate and rhythm, normal heart sounds, without pathological murmurs, ectopy, gallops, or rubs. Skin:  Normal turgor. Warm, dry, without visible rash. Lymphatic: No lymphangitis or adenopathy noted. Neurological:  Oriented. Motor functions intact. Lab / Imaging Results   (All laboratory and radiology results have been personally reviewed by myself)  Labs:  No results found for this visit on 03/04/21. Imaging: All Radiology results interpreted by Radiologist unless otherwise noted. EXAMINATION:   TWO XRAY VIEWS OF THE CHEST   2/25/2021 8:14 am   COMPARISON:   28 June 2017   HISTORY:   ORDERING SYSTEM PROVIDED HISTORY: COVID-19   TECHNOLOGIST PROVIDED HISTORY:   Reason for exam:->SOB without Chest Pain   What reading provider will be dictating this exam?->CRC   FINDINGS:   The lungs are without acute focal process.  There is no effusion or   pneumothorax. The cardiomediastinal silhouette is without acute process. The   osseous structures are without acute process.       Impression   No acute process. Medical Decision Making   Pt non-toxic, in no apparent distress and stable at time of discharge. Assessment/Plan   Sushma was seen today for other, cough and fatigue. Diagnoses and all orders for this visit:    Post-viral cough syndrome  -     benzonatate (TESSALON) 100 MG capsule;  Take 1-2 capsules by mouth 3 times daily as needed for Cough    COVID-19    Encounter to obtain excuse from work        For [employer] guidelines the patient quarantined appropriately and may discontinue quarantine at this time. Given that her symptoms have significantly improved patient may return to work on 3/8/2021 as tolerated. A work slip was provided to the patient today. Increase fluids and rest.  Advised patient to take zinc and vitamin C for immune support. Advised patient to continue taking albuterol inhaler as needed for shortness of breath, wheezing, cough. Prescription written for [facility] today, side effects and administration instructions were discussed. Other symptomatic relief discussed including Tylenol prn pain/fever. Schedule virtual f/u with PCP in 7-10 days if symptoms persist.  Go to ED sooner if symptoms worsen or change. ED immediately with high or refractory fever, progressive SOB, dyspnea, CP, calf pain/swelling, shaking chills, vomiting, abdominal pain, lethargy, flank pain, or decreased urinary output. Pt verbalizes understanding and is in agreement with plan of care. All questions answered. MEHUL Fuller NP    *NOTE: This report was transcribed using voice recognition software. Every effort was made to ensure accuracy; however, inadvertent computerized transcription errors may be present.

## 2021-03-08 ENCOUNTER — HOSPITAL ENCOUNTER (OUTPATIENT)
Age: 51
Setting detail: OBSERVATION
Discharge: HOME | End: 2021-03-09
Attending: EMERGENCY MEDICINE | Admitting: FAMILY MEDICINE
Payer: COMMERCIAL

## 2021-03-08 ENCOUNTER — APPOINTMENT (OUTPATIENT)
Dept: GENERAL RADIOLOGY | Age: 51
End: 2021-03-08
Payer: COMMERCIAL

## 2021-03-08 DIAGNOSIS — E83.42 HYPOMAGNESEMIA: ICD-10-CM

## 2021-03-08 DIAGNOSIS — R20.0 NUMBNESS: ICD-10-CM

## 2021-03-08 DIAGNOSIS — E87.6 HYPOKALEMIA: Primary | ICD-10-CM

## 2021-03-08 PROBLEM — N93.9 ABNORMAL UTERINE BLEEDING: Status: ACTIVE | Noted: 2021-03-08

## 2021-03-08 LAB
ALBUMIN SERPL-MCNC: 3.8 G/DL (ref 3.5–5.2)
ALP BLD-CCNC: 101 U/L (ref 35–104)
ALT SERPL-CCNC: 18 U/L (ref 0–32)
ANION GAP SERPL CALCULATED.3IONS-SCNC: 15 MMOL/L (ref 7–16)
AST SERPL-CCNC: 46 U/L (ref 0–31)
BASOPHILS ABSOLUTE: 0.05 E9/L (ref 0–0.2)
BASOPHILS RELATIVE PERCENT: 0.6 % (ref 0–2)
BILIRUB SERPL-MCNC: 0.7 MG/DL (ref 0–1.2)
BUN BLDV-MCNC: 5 MG/DL (ref 6–20)
CALCIUM SERPL-MCNC: 8 MG/DL (ref 8.6–10.2)
CHLORIDE BLD-SCNC: 93 MMOL/L (ref 98–107)
CO2: 26 MMOL/L (ref 22–29)
CREAT SERPL-MCNC: 0.6 MG/DL (ref 0.5–1)
EOSINOPHILS ABSOLUTE: 0.06 E9/L (ref 0.05–0.5)
EOSINOPHILS RELATIVE PERCENT: 0.7 % (ref 0–6)
GFR AFRICAN AMERICAN: >60
GFR NON-AFRICAN AMERICAN: >60 ML/MIN/1.73
GLUCOSE BLD-MCNC: 123 MG/DL (ref 74–99)
HCG QUALITATIVE: NEGATIVE
HCT VFR BLD CALC: 28 % (ref 34–48)
HEMOGLOBIN: 9.5 G/DL (ref 11.5–15.5)
IMMATURE GRANULOCYTES #: 0.05 E9/L
IMMATURE GRANULOCYTES %: 0.6 % (ref 0–5)
LYMPHOCYTES ABSOLUTE: 1.76 E9/L (ref 1.5–4)
LYMPHOCYTES RELATIVE PERCENT: 20.3 % (ref 20–42)
MAGNESIUM: 1.3 MG/DL (ref 1.6–2.6)
MCH RBC QN AUTO: 29.4 PG (ref 26–35)
MCHC RBC AUTO-ENTMCNC: 33.9 % (ref 32–34.5)
MCV RBC AUTO: 86.7 FL (ref 80–99.9)
MONOCYTES ABSOLUTE: 0.9 E9/L (ref 0.1–0.95)
MONOCYTES RELATIVE PERCENT: 10.4 % (ref 2–12)
NEUTROPHILS ABSOLUTE: 5.86 E9/L (ref 1.8–7.3)
NEUTROPHILS RELATIVE PERCENT: 67.4 % (ref 43–80)
PDW BLD-RTO: 15.9 FL (ref 11.5–15)
PLATELET # BLD: 295 E9/L (ref 130–450)
PMV BLD AUTO: 9 FL (ref 7–12)
POTASSIUM REFLEX MAGNESIUM: 2.7 MMOL/L (ref 3.5–5)
RBC # BLD: 3.23 E12/L (ref 3.5–5.5)
SODIUM BLD-SCNC: 134 MMOL/L (ref 132–146)
TOTAL PROTEIN: 7.5 G/DL (ref 6.4–8.3)
TROPONIN: <0.01 NG/ML (ref 0–0.03)
TSH SERPL DL<=0.05 MIU/L-ACNC: 2.59 UIU/ML (ref 0.27–4.2)
WBC # BLD: 8.7 E9/L (ref 4.5–11.5)

## 2021-03-08 PROCEDURE — 93005 ELECTROCARDIOGRAM TRACING: CPT | Performed by: PHYSICIAN ASSISTANT

## 2021-03-08 PROCEDURE — 85025 COMPLETE CBC W/AUTO DIFF WBC: CPT

## 2021-03-08 PROCEDURE — 71046 X-RAY EXAM CHEST 2 VIEWS: CPT

## 2021-03-08 PROCEDURE — G0378 HOSPITAL OBSERVATION PER HR: HCPCS

## 2021-03-08 PROCEDURE — 84703 CHORIONIC GONADOTROPIN ASSAY: CPT

## 2021-03-08 PROCEDURE — 83735 ASSAY OF MAGNESIUM: CPT

## 2021-03-08 PROCEDURE — 2580000003 HC RX 258: Performed by: PHYSICIAN ASSISTANT

## 2021-03-08 PROCEDURE — 84484 ASSAY OF TROPONIN QUANT: CPT

## 2021-03-08 PROCEDURE — 6360000002 HC RX W HCPCS: Performed by: PHYSICIAN ASSISTANT

## 2021-03-08 PROCEDURE — 84443 ASSAY THYROID STIM HORMONE: CPT

## 2021-03-08 PROCEDURE — 96365 THER/PROPH/DIAG IV INF INIT: CPT

## 2021-03-08 PROCEDURE — 96366 THER/PROPH/DIAG IV INF ADDON: CPT

## 2021-03-08 PROCEDURE — 80053 COMPREHEN METABOLIC PANEL: CPT

## 2021-03-08 PROCEDURE — 6370000000 HC RX 637 (ALT 250 FOR IP): Performed by: PHYSICIAN ASSISTANT

## 2021-03-08 PROCEDURE — 99285 EMERGENCY DEPT VISIT HI MDM: CPT

## 2021-03-08 PROCEDURE — 96367 TX/PROPH/DG ADDL SEQ IV INF: CPT

## 2021-03-08 RX ORDER — POTASSIUM CHLORIDE 7.45 MG/ML
10 INJECTION INTRAVENOUS ONCE
Status: COMPLETED | OUTPATIENT
Start: 2021-03-08 | End: 2021-03-09

## 2021-03-08 RX ORDER — 0.9 % SODIUM CHLORIDE 0.9 %
1000 INTRAVENOUS SOLUTION INTRAVENOUS ONCE
Status: COMPLETED | OUTPATIENT
Start: 2021-03-08 | End: 2021-03-08

## 2021-03-08 RX ORDER — POTASSIUM CHLORIDE 20 MEQ/1
40 TABLET, EXTENDED RELEASE ORAL ONCE
Status: COMPLETED | OUTPATIENT
Start: 2021-03-08 | End: 2021-03-08

## 2021-03-08 RX ORDER — MAGNESIUM SULFATE IN WATER 40 MG/ML
2000 INJECTION, SOLUTION INTRAVENOUS ONCE
Status: COMPLETED | OUTPATIENT
Start: 2021-03-08 | End: 2021-03-08

## 2021-03-08 RX ADMIN — MAGNESIUM SULFATE HEPTAHYDRATE 2000 MG: 40 INJECTION, SOLUTION INTRAVENOUS at 21:02

## 2021-03-08 RX ADMIN — SODIUM CHLORIDE 1000 ML: 9 INJECTION, SOLUTION INTRAVENOUS at 19:45

## 2021-03-08 RX ADMIN — POTASSIUM CHLORIDE 40 MEQ: 1500 TABLET, EXTENDED RELEASE ORAL at 21:02

## 2021-03-08 RX ADMIN — POTASSIUM CHLORIDE 10 MEQ: 10 INJECTION, SOLUTION INTRAVENOUS at 23:08

## 2021-03-08 ASSESSMENT — ENCOUNTER SYMPTOMS
FACIAL SWELLING: 0
ABDOMINAL PAIN: 0
COUGH: 0
SINUS PRESSURE: 0
VOMITING: 0
CONSTIPATION: 0
SORE THROAT: 0
SINUS PAIN: 0
SHORTNESS OF BREATH: 0
TROUBLE SWALLOWING: 0
BACK PAIN: 0
COLOR CHANGE: 0
DIARRHEA: 0
CHEST TIGHTNESS: 0
NAUSEA: 0

## 2021-03-09 ENCOUNTER — APPOINTMENT (OUTPATIENT)
Dept: ULTRASOUND IMAGING | Age: 51
End: 2021-03-09
Payer: COMMERCIAL

## 2021-03-09 VITALS
OXYGEN SATURATION: 100 % | SYSTOLIC BLOOD PRESSURE: 122 MMHG | DIASTOLIC BLOOD PRESSURE: 84 MMHG | TEMPERATURE: 96.7 F | RESPIRATION RATE: 20 BRPM | HEART RATE: 74 BPM

## 2021-03-09 LAB
ANION GAP SERPL CALCULATED.3IONS-SCNC: 10 MMOL/L (ref 7–16)
ANION GAP SERPL CALCULATED.3IONS-SCNC: 10 MMOL/L (ref 7–16)
APTT: 22.7 SEC (ref 24.5–35.1)
BUN BLDV-MCNC: 3 MG/DL (ref 6–20)
BUN BLDV-MCNC: 3 MG/DL (ref 6–20)
CALCIUM IONIZED: 1.15 MMOL/L (ref 1.15–1.33)
CALCIUM SERPL-MCNC: 7.5 MG/DL (ref 8.6–10.2)
CALCIUM SERPL-MCNC: 8.3 MG/DL (ref 8.6–10.2)
CHLORIDE BLD-SCNC: 101 MMOL/L (ref 98–107)
CHLORIDE BLD-SCNC: 102 MMOL/L (ref 98–107)
CO2: 27 MMOL/L (ref 22–29)
CO2: 28 MMOL/L (ref 22–29)
CREAT SERPL-MCNC: 0.6 MG/DL (ref 0.5–1)
CREAT SERPL-MCNC: 0.7 MG/DL (ref 0.5–1)
EKG ATRIAL RATE: 74 BPM
EKG P AXIS: 53 DEGREES
EKG P-R INTERVAL: 176 MS
EKG Q-T INTERVAL: 440 MS
EKG QRS DURATION: 86 MS
EKG QTC CALCULATION (BAZETT): 488 MS
EKG R AXIS: 10 DEGREES
EKG T AXIS: 8 DEGREES
EKG VENTRICULAR RATE: 74 BPM
FERRITIN: 19 NG/ML
FOLATE: >20 NG/ML (ref 4.8–24.2)
GFR AFRICAN AMERICAN: >60
GFR AFRICAN AMERICAN: >60
GFR NON-AFRICAN AMERICAN: >60 ML/MIN/1.73
GFR NON-AFRICAN AMERICAN: >60 ML/MIN/1.73
GLUCOSE BLD-MCNC: 101 MG/DL (ref 74–99)
GLUCOSE BLD-MCNC: 99 MG/DL (ref 74–99)
HCT VFR BLD CALC: 25 % (ref 34–48)
HCT VFR BLD CALC: 29.2 % (ref 34–48)
HEMOGLOBIN: 8.5 G/DL (ref 11.5–15.5)
HEMOGLOBIN: 9.5 G/DL (ref 11.5–15.5)
INR BLD: 1.1
IRON SATURATION: 39 % (ref 15–50)
IRON: 123 MCG/DL (ref 37–145)
MAGNESIUM: 1.7 MG/DL (ref 1.6–2.6)
MAGNESIUM: 1.8 MG/DL (ref 1.6–2.6)
MCH RBC QN AUTO: 29.6 PG (ref 26–35)
MCHC RBC AUTO-ENTMCNC: 34 % (ref 32–34.5)
MCV RBC AUTO: 87.1 FL (ref 80–99.9)
PDW BLD-RTO: 15.6 FL (ref 11.5–15)
PLATELET # BLD: 272 E9/L (ref 130–450)
PMV BLD AUTO: 9.5 FL (ref 7–12)
POTASSIUM REFLEX MAGNESIUM: 3.1 MMOL/L (ref 3.5–5)
POTASSIUM SERPL-SCNC: 3.1 MMOL/L (ref 3.5–5)
POTASSIUM SERPL-SCNC: 3.1 MMOL/L (ref 3.5–5)
POTASSIUM SERPL-SCNC: 3.3 MMOL/L (ref 3.5–5)
PROTHROMBIN TIME: 11.7 SEC (ref 9.3–12.4)
RBC # BLD: 2.87 E12/L (ref 3.5–5.5)
SODIUM BLD-SCNC: 139 MMOL/L (ref 132–146)
SODIUM BLD-SCNC: 139 MMOL/L (ref 132–146)
TOTAL IRON BINDING CAPACITY: 316 MCG/DL (ref 250–450)
VITAMIN B-12: 322 PG/ML (ref 211–946)
WBC # BLD: 6.1 E9/L (ref 4.5–11.5)

## 2021-03-09 PROCEDURE — 85730 THROMBOPLASTIN TIME PARTIAL: CPT

## 2021-03-09 PROCEDURE — 82607 VITAMIN B-12: CPT

## 2021-03-09 PROCEDURE — 2580000003 HC RX 258: Performed by: FAMILY MEDICINE

## 2021-03-09 PROCEDURE — 84244 ASSAY OF RENIN: CPT

## 2021-03-09 PROCEDURE — 80048 BASIC METABOLIC PNL TOTAL CA: CPT

## 2021-03-09 PROCEDURE — 93975 VASCULAR STUDY: CPT

## 2021-03-09 PROCEDURE — 85014 HEMATOCRIT: CPT

## 2021-03-09 PROCEDURE — 93975 VASCULAR STUDY: CPT | Performed by: RADIOLOGY

## 2021-03-09 PROCEDURE — 6370000000 HC RX 637 (ALT 250 FOR IP): Performed by: FAMILY MEDICINE

## 2021-03-09 PROCEDURE — 83550 IRON BINDING TEST: CPT

## 2021-03-09 PROCEDURE — 82088 ASSAY OF ALDOSTERONE: CPT

## 2021-03-09 PROCEDURE — 76830 TRANSVAGINAL US NON-OB: CPT

## 2021-03-09 PROCEDURE — 93010 ELECTROCARDIOGRAM REPORT: CPT | Performed by: INTERNAL MEDICINE

## 2021-03-09 PROCEDURE — G0378 HOSPITAL OBSERVATION PER HR: HCPCS

## 2021-03-09 PROCEDURE — 82330 ASSAY OF CALCIUM: CPT

## 2021-03-09 PROCEDURE — 76770 US EXAM ABDO BACK WALL COMP: CPT

## 2021-03-09 PROCEDURE — 83540 ASSAY OF IRON: CPT

## 2021-03-09 PROCEDURE — 84132 ASSAY OF SERUM POTASSIUM: CPT

## 2021-03-09 PROCEDURE — 85610 PROTHROMBIN TIME: CPT

## 2021-03-09 PROCEDURE — 85027 COMPLETE CBC AUTOMATED: CPT

## 2021-03-09 PROCEDURE — 82746 ASSAY OF FOLIC ACID SERUM: CPT

## 2021-03-09 PROCEDURE — 99222 1ST HOSP IP/OBS MODERATE 55: CPT | Performed by: FAMILY MEDICINE

## 2021-03-09 PROCEDURE — 82728 ASSAY OF FERRITIN: CPT

## 2021-03-09 PROCEDURE — 36415 COLL VENOUS BLD VENIPUNCTURE: CPT

## 2021-03-09 PROCEDURE — 85018 HEMOGLOBIN: CPT

## 2021-03-09 PROCEDURE — 83735 ASSAY OF MAGNESIUM: CPT

## 2021-03-09 PROCEDURE — 96366 THER/PROPH/DIAG IV INF ADDON: CPT

## 2021-03-09 RX ORDER — AMLODIPINE BESYLATE 5 MG/1
5 TABLET ORAL DAILY
Status: DISCONTINUED | OUTPATIENT
Start: 2021-03-09 | End: 2021-03-09

## 2021-03-09 RX ORDER — POTASSIUM CHLORIDE 20 MEQ/1
40 TABLET, EXTENDED RELEASE ORAL ONCE
Status: DISCONTINUED | OUTPATIENT
Start: 2021-03-09 | End: 2021-03-09 | Stop reason: HOSPADM

## 2021-03-09 RX ORDER — POTASSIUM CHLORIDE 20 MEQ/1
40 TABLET, EXTENDED RELEASE ORAL DAILY
Status: DISCONTINUED | OUTPATIENT
Start: 2021-03-10 | End: 2021-03-09 | Stop reason: HOSPADM

## 2021-03-09 RX ORDER — ALBUTEROL SULFATE 2.5 MG/3ML
2.5 SOLUTION RESPIRATORY (INHALATION) EVERY 6 HOURS PRN
Status: DISCONTINUED | OUTPATIENT
Start: 2021-03-09 | End: 2021-03-09 | Stop reason: HOSPADM

## 2021-03-09 RX ORDER — BENZONATATE 100 MG/1
100 CAPSULE ORAL 3 TIMES DAILY PRN
Status: DISCONTINUED | OUTPATIENT
Start: 2021-03-09 | End: 2021-03-09 | Stop reason: HOSPADM

## 2021-03-09 RX ORDER — AMLODIPINE BESYLATE 10 MG/1
10 TABLET ORAL DAILY
Qty: 30 TABLET | Refills: 3 | Status: SHIPPED
Start: 2021-03-10 | End: 2021-09-17 | Stop reason: SDUPTHER

## 2021-03-09 RX ORDER — POTASSIUM CHLORIDE 20 MEQ/1
40 TABLET, EXTENDED RELEASE ORAL ONCE
Status: COMPLETED | OUTPATIENT
Start: 2021-03-09 | End: 2021-03-09

## 2021-03-09 RX ORDER — POTASSIUM CHLORIDE 20 MEQ/1
20 TABLET, EXTENDED RELEASE ORAL 2 TIMES DAILY
Qty: 30 TABLET | Refills: 0 | Status: SHIPPED | OUTPATIENT
Start: 2021-03-09 | End: 2021-05-04 | Stop reason: SDUPTHER

## 2021-03-09 RX ORDER — AMLODIPINE BESYLATE 5 MG/1
5 TABLET ORAL ONCE
Status: COMPLETED | OUTPATIENT
Start: 2021-03-09 | End: 2021-03-09

## 2021-03-09 RX ORDER — PROMETHAZINE HYDROCHLORIDE 25 MG/1
12.5 TABLET ORAL EVERY 6 HOURS PRN
Status: DISCONTINUED | OUTPATIENT
Start: 2021-03-09 | End: 2021-03-09 | Stop reason: HOSPADM

## 2021-03-09 RX ORDER — ACETAMINOPHEN 325 MG/1
650 TABLET ORAL EVERY 6 HOURS PRN
Status: DISCONTINUED | OUTPATIENT
Start: 2021-03-09 | End: 2021-03-09 | Stop reason: HOSPADM

## 2021-03-09 RX ORDER — HYDRALAZINE HYDROCHLORIDE 20 MG/ML
10 INJECTION INTRAMUSCULAR; INTRAVENOUS EVERY 4 HOURS PRN
Status: DISCONTINUED | OUTPATIENT
Start: 2021-03-09 | End: 2021-03-09 | Stop reason: HOSPADM

## 2021-03-09 RX ORDER — ONDANSETRON 2 MG/ML
4 INJECTION INTRAMUSCULAR; INTRAVENOUS EVERY 6 HOURS PRN
Status: DISCONTINUED | OUTPATIENT
Start: 2021-03-09 | End: 2021-03-09 | Stop reason: HOSPADM

## 2021-03-09 RX ORDER — SODIUM CHLORIDE 0.9 % (FLUSH) 0.9 %
10 SYRINGE (ML) INJECTION PRN
Status: DISCONTINUED | OUTPATIENT
Start: 2021-03-09 | End: 2021-03-09 | Stop reason: HOSPADM

## 2021-03-09 RX ORDER — POTASSIUM CHLORIDE 20 MEQ/1
20 TABLET, EXTENDED RELEASE ORAL DAILY
Qty: 10 TABLET | Refills: 0 | Status: SHIPPED | OUTPATIENT
Start: 2021-03-09 | End: 2021-03-09

## 2021-03-09 RX ORDER — POLYETHYLENE GLYCOL 3350 17 G/17G
17 POWDER, FOR SOLUTION ORAL DAILY PRN
Status: DISCONTINUED | OUTPATIENT
Start: 2021-03-09 | End: 2021-03-09 | Stop reason: HOSPADM

## 2021-03-09 RX ORDER — ACETAMINOPHEN 650 MG/1
650 SUPPOSITORY RECTAL EVERY 6 HOURS PRN
Status: DISCONTINUED | OUTPATIENT
Start: 2021-03-09 | End: 2021-03-09 | Stop reason: HOSPADM

## 2021-03-09 RX ORDER — METOPROLOL TARTRATE 50 MG/1
50 TABLET, FILM COATED ORAL 2 TIMES DAILY
Status: DISCONTINUED | OUTPATIENT
Start: 2021-03-09 | End: 2021-03-09 | Stop reason: HOSPADM

## 2021-03-09 RX ORDER — SODIUM CHLORIDE 9 MG/ML
INJECTION, SOLUTION INTRAVENOUS CONTINUOUS
Status: DISCONTINUED | OUTPATIENT
Start: 2021-03-09 | End: 2021-03-09 | Stop reason: HOSPADM

## 2021-03-09 RX ORDER — POTASSIUM CHLORIDE 20 MEQ/1
20 TABLET, EXTENDED RELEASE ORAL 2 TIMES DAILY WITH MEALS
Status: DISCONTINUED | OUTPATIENT
Start: 2021-03-10 | End: 2021-03-09 | Stop reason: HOSPADM

## 2021-03-09 RX ORDER — AMLODIPINE BESYLATE 10 MG/1
10 TABLET ORAL DAILY
Status: DISCONTINUED | OUTPATIENT
Start: 2021-03-10 | End: 2021-03-09 | Stop reason: HOSPADM

## 2021-03-09 RX ORDER — SODIUM CHLORIDE 0.9 % (FLUSH) 0.9 %
10 SYRINGE (ML) INJECTION EVERY 12 HOURS SCHEDULED
Status: DISCONTINUED | OUTPATIENT
Start: 2021-03-09 | End: 2021-03-09 | Stop reason: HOSPADM

## 2021-03-09 RX ORDER — POTASSIUM CHLORIDE 20 MEQ/1
TABLET, EXTENDED RELEASE ORAL
Status: DISCONTINUED
Start: 2021-03-09 | End: 2021-03-09

## 2021-03-09 RX ADMIN — SODIUM CHLORIDE, PRESERVATIVE FREE 10 ML: 5 INJECTION INTRAVENOUS at 10:19

## 2021-03-09 RX ADMIN — AMLODIPINE BESYLATE 5 MG: 5 TABLET ORAL at 13:46

## 2021-03-09 RX ADMIN — POTASSIUM CHLORIDE 40 MEQ: 20 TABLET, EXTENDED RELEASE ORAL at 05:22

## 2021-03-09 RX ADMIN — SODIUM CHLORIDE: 9 INJECTION, SOLUTION INTRAVENOUS at 05:00

## 2021-03-09 RX ADMIN — METOPROLOL TARTRATE 50 MG: 50 TABLET, FILM COATED ORAL at 10:19

## 2021-03-09 RX ADMIN — AMLODIPINE BESYLATE 5 MG: 5 TABLET ORAL at 10:19

## 2021-03-09 NOTE — PROGRESS NOTES
CLINICAL PHARMACY NOTE: MEDS TO [address] Select Patient?: Yes  Total # of Prescriptions Filled: 2   The following medications were delivered to the patient:  · NORVASC 10 MG   · POTASSIUM CHLORIDE 20 ME   Total # of Interventions Completed: 3  Time Spent (min): 15    Additional Documentation:

## 2021-03-09 NOTE — PROGRESS NOTES
[address] [place] [address] Attending    S: 48 y.o. female presented with perioral numbness and cramping of extremities. No difficulties with speaking or swallowing. Was recently off work for 1 month due to [employer]. She had very poor appetite since then. She was scheduled for return to work on the day prior to admission, and had felt strong enough at home, but the additional rigors of work, without frequent sitting down, really tired her out. In the emergency room she was found to be depleted of potassium and magnesium. Likewise she had recently had heavy menstrual bleeding, and had been somewhat perimenopausal, and was found to be significantly anemic. She was seen this morning while undergoing a vaginal ultrasound. She is in no distress at this time. Her only significant symptom had been overwhelming fatigue. O: VS- Blood pressure (!) 177/104, pulse 64, temperature 97.9 °F (36.6 °C), temperature source Temporal, resp. rate 20, SpO2 100 %, not currently breastfeeding. Exam is as noted by resident.     In no acute distress  Neck supple, no bruit  Heart regular rhythm, no murmur or gallop  Lungs clear to auscultation  Abdomen supple, no masses tenderness organomegaly  Neurological exam grossly intact    Lab Results   Component Value Date    WBC 6.1 03/09/2021    WBC 8.7 03/08/2021    WBC 11.8 (H) 02/23/2018    HGB 8.5 (L) 03/09/2021    HCT 25.0 (L) 03/09/2021    MCV 87.1 03/09/2021     03/09/2021             Lab Results   Component Value Date     03/09/2021    K 3.1 03/09/2021    K 3.1 03/09/2021     03/09/2021    CO2 27 03/09/2021    BUN 3 03/09/2021    CREATININE 0.6 03/09/2021    GFRAA >60 03/09/2021    LABGLOM >60 03/09/2021    GLUCOSE 99 03/09/2021    GLUCOSE 106 03/16/2011    PROT 7.5 03/08/2021    LABALBU 3.8 03/08/2021    LABALBU 4.0 03/16/2011    CALCIUM 7.5 03/09/2021    BILITOT 0.7 03/08/2021    ALKPHOS 101 03/08/2021    AST 46 03/08/2021    ALT 18 03/08/2021 Impressions:   Principal Problem:    Hypokalemia  Active Problems:    HTN (hypertension)    Abnormal uterine bleeding    Hypomagnesemia      Plan:     XR CHEST (2 VW)    CT HEAD WO CONTRAST    US RETROPERITONEAL NICOLE    US NON OB TRANSVAGINAL    CBC Auto Differential    Comprehensive Metabolic Panel w/ Reflex to MG    Magnesium    TSH without Reflex    Troponin    HCG, SERUM, QUALITATIVE    Basic Metabolic Panel w/ Reflex to MG    CBC    Hemoglobin and hematocrit, blood    Protime-INR    APTT    Renin    Aldosterone    Iron and TIBC    Ferritin    Vitamin B12 & folate    Basic metabolic panel    Potassium    DIET CARDIAC;    Vital signs per unit routine    Up with assistance    Place intermittent pneumatic compression device    Telemetry Monitoring    Vital signs - notify MD    Nursing communication    Full Code    Inpatient consult to [facility]    Initiate Oxygen Therapy Protocol    EKG 12 Lead    Insert peripheral IV    PATIENT STATUS (FROM ED OR OR/PROCEDURAL) Observation            Attending Physician Statement  I have reviewed the chart, including any radiology or labs, and seen the patient with the resident(s). I personally reviewed and performed key elements of the history and exam.  I agree with the assessment, plan and orders as documented by the resident. Please refer to the resident note for additional information.       Mary Ann Varner

## 2021-03-09 NOTE — H&P
[facility] Resident Inpatient  History and Physical      CC: Numbness/cramping    HPI: History obtained from patient, electronic medical record. Patient is oriented to time, place, person. Holley Michaels is a 48 y.o. female with a PMH of hypertension, recent Covid infection who presents to ED for Numbness (pt reports numbness around lips, cramping in hands and feet, hx anxiety, states she feels like this is a panic attack)    Patient reports perioral numbness that began earlier today, also having cramping of her hands and feet. Patient reports symptoms have been persistent throughout the day, not made worse or better with anything. Denies any difficulty speaking or swallowing, weakness to 1 side of the body. No other numbness anywhere else. Patient reports that she has had decreased p.o. intake over the last few weeks secondary to Covid infection and general malaise. Patient also with some nausea and vomiting earlier today. Cough and shortness of breath are overall improving, is using albuterol which is helping as necessary. Patient reporting about 1 month of vaginal bleeding, daily, going through multiple poise pads. Patient reports that she previously had regular periods with normal flow prior to this. Has not gone through menopause. Does not follow with OB/GYN. Reports that she did have a cervical biopsy about 20 years ago, has never required any surgical intervention. Denies any other easy bleeding, no family history of bleeding disorders. Patient denies fevers, chills, chest pain, shortness of breath, abdominal pain, constipation, diarrhea, hematuria, dysuria. ED Course: The patient remained hemodynamically stable. Labs potassium 2.7, magnesium 1.3, hemoglobin 9.5  Imaging was CT head pending  EKG: normal sinus rhythm, unchanged from previous tracings. Patient was given 10 mEq potassium IV, 40 mEq oral, 2 g of mag.    Pt admitted for hypokalemia/hypomagnesemia    ED orders: Orders Placed This Encounter   Procedures    XR CHEST (2 VW)    CT HEAD WO CONTRAST    US RETROPERITONEAL NICOLE    US NON OB TRANSVAGINAL    CBC Auto Differential    Comprehensive Metabolic Panel w/ Reflex to MG    Magnesium    TSH without Reflex    Troponin    HCG, SERUM, QUALITATIVE    Basic Metabolic Panel w/ Reflex to MG    CBC    Hemoglobin and hematocrit, blood    Protime-INR    APTT    Renin    Aldosterone    Iron and TIBC    Ferritin    Vitamin B12 & folate    Basic metabolic panel    Potassium    DIET CARDIAC;    Vital signs per unit routine    Up with assistance    Place intermittent pneumatic compression device    Telemetry Monitoring    Vital signs - notify MD    Nursing communication    Full Code    Inpatient consult to Family Practice    Initiate Oxygen Therapy Protocol    EKG 12 Lead    Insert peripheral IV    PATIENT STATUS (FROM ED OR OR/PROCEDURAL) Observation       PMH:  has a past medical history of Hypertension. PSH:  has no past surgical history on file. FH: family history includes Stroke in her father. Social:  reports that she has quit smoking. Her smoking use included cigarettes. She has a 2.50 pack-year smoking history. She has never used smokeless tobacco. She reports current alcohol use. She reports that she does not use drugs. Allergies: Allergies   Allergen Reactions    Penicillins Hives and Itching        Home Medications:   No current facility-administered medications on file prior to encounter.       Current Outpatient Medications on File Prior to Encounter   Medication Sig Dispense Refill    benzonatate (TESSALON) 100 MG capsule Take 1-2 capsules by mouth 3 times daily as needed for Cough 60 capsule 0    chlorthalidone (HYGROTON) 25 MG tablet Take 1 tablet by mouth daily 30 tablet 3    metoprolol tartrate (LOPRESSOR) 50 MG tablet Take 1 tablet by mouth 2 times daily 180 tablet 3    albuterol sulfate HFA (VENTOLIN HFA) 108 (90 Base) MCG/ACT inhaler Inhale 2 puffs into the lungs every 4-6 hours as needed for Wheezing or Shortness of Breath 1 Inhaler 0       ROS:   Const: No fever, chills, night sweats, no recent unexplained weight gain/loss +weakness/malaise  HEENT: No blurred vision, double vision; no URI symptoms  Resp: + cough, no sputum, no pleuritic chest pain, no sob  Cardio: No chest pain, no exertional dyspnea, no PND, no orthopnea, no palpitation, no leg swelling. GI: No dysphagia, no reflux; no abdominal pain, + n/v; no c/d. No hematochezia    : No dysuria, no frequency, hesitancy; no hematuria  MSK: no joint pain, no myalgia, no change in ROM  Neuro: no focal weakness, no slurred speech, no double vision, +perioral numbness  Endo: no heat/cold intolerance, no polyphagia, polydipsia or polyuria  Hem: no increased bleeding, no bruising, no lymphadenopathy  Skin: no skin changes  Psych: no depressed mood, no suicidal ideation    PE:  Blood pressure (!) 168/90, pulse 61, temperature 98 °F (36.7 °C), temperature source Oral, resp. rate 20, SpO2 98 %, not currently breastfeeding. General: Alert, cooperative, no acute distress. Weak appearing  HEENT: Normocephalic, atraumatic. PERRLA, conjunctiva/corneas clear, EOM's intact, no pallor or icterus. Oropharynx clear. Mucous membranes dry   Neck: Supple, symmetrical, trachea midline, no JVD. Thyroid non tender, no obvious masses. No cervical lymphadenopathy. Chest: No tenderness or deformity, full & symmetric excursion  Lung: Clear to auscultation bilaterally,  respirations unlabored. No rales/wheezing/rubs  Heart: RRR, S1 and S2 normal, no murmur, rub or gallop. DP pulses 2/4  Abdomen: SNTND, no masses, no organomegaly, no guarding, rebound or rigidity. Genital/Rectal: deferred  Extremities:  Extremities normal, atraumatic, no cyanosis or edema.  Distal pulses equal bilaterally  Skin: Skin color, texture, turgor normal, no rashes or lesions  Musculoskeletal: No joint swelling, infection/fatigue  Patient diagnosed with Covid about a month ago, first day back to work was today  Reports slow improvement in symptoms, cough improved, still fatigued and weak  Continue albuterol as needed Tessalon for cough    Abnormal uterine bleeding  1 month per patient of daily bleeding, going through multiple poise pads  Does not currently follow with OB/GYN  Hemoglobin 9.5 on admission, last checked few years ago and was normal  Check ultrasound transvaginal  H&H every 8 hours  Consider GYN consult based on hemoglobin trends and ultrasound report    Anemia  Possibly secondary to above versus other iron deficiency versus vitamin deficiency  H&H every 8 hours  Keep hemoglobin above 7  Check iron studies, B12 and folate level         DVT / GI prophylaxis: PCDs and GI prophylaxis not indicated    Dispo -admit to telemetry      Electronically signed by Adan Carcamo MD PGY-2 on 3/9/2021 at 5:30 AM  This case was discussed with attending physician: Dr. Brie Vu

## 2021-03-09 NOTE — ED NOTES
[facility] faxed, floor called, transport pending.  Pt did not want to be gown at this time     Jorden Mckinley, IVONNE  03/09/21 [id number]

## 2021-03-09 NOTE — ED PROVIDER NOTES
breath. Cardiovascular: Negative for chest pain, palpitations and leg swelling. Gastrointestinal: Negative for abdominal pain, constipation, diarrhea, nausea and vomiting. Genitourinary: Negative for dysuria, flank pain, frequency and hematuria. Musculoskeletal: Positive for myalgias. Negative for back pain, neck pain and neck stiffness. Skin: Negative for color change, pallor and rash. Neurological: Positive for numbness. Negative for dizziness, weakness, light-headedness and headaches. Psychiatric/Behavioral: Negative for agitation, behavioral problems and confusion. The patient is nervous/anxious. Pertinent positives and negatives are stated within HPI, all other systems reviewed and are negative.      --------------------------------------------- PAST HISTORY ---------------------------------------------  Past Medical History:  has a past medical history of Hypertension. Past Surgical History:  has no past surgical history on file. Social History:  reports that she has quit smoking. Her smoking use included cigarettes. She has a 2.50 pack-year smoking history. She has never used smokeless tobacco. She reports current alcohol use. She reports that she does not use drugs. Family History: family history includes Stroke in her father. The patients home medications have been reviewed.     Allergies: Penicillins    -------------------------------------------------- RESULTS -------------------------------------------------  All laboratory and radiology results have been personally reviewed by myself   LABS:  Results for orders placed or performed during the hospital encounter of 03/08/21   CBC Auto Differential   Result Value Ref Range    WBC 8.7 4.5 - 11.5 E9/L    RBC 3.23 (L) 3.50 - 5.50 E12/L    Hemoglobin 9.5 (L) 11.5 - 15.5 g/dL    Hematocrit 28.0 (L) 34.0 - 48.0 %    MCV 86.7 80.0 - 99.9 fL    MCH 29.4 26.0 - 35.0 pg    MCHC 33.9 32.0 - 34.5 %    RDW 15.9 (H) 11.5 - 15.0 fL Platelets 241 672 - 116 E9/L    MPV 9.0 7.0 - 12.0 fL    Neutrophils % 67.4 43.0 - 80.0 %    Immature Granulocytes % 0.6 0.0 - 5.0 %    Lymphocytes % 20.3 20.0 - 42.0 %    Monocytes % 10.4 2.0 - 12.0 %    Eosinophils % 0.7 0.0 - 6.0 %    Basophils % 0.6 0.0 - 2.0 %    Neutrophils Absolute 5.86 1.80 - 7.30 E9/L    Immature Granulocytes # 0.05 E9/L    Lymphocytes Absolute 1.76 1.50 - 4.00 E9/L    Monocytes Absolute 0.90 0.10 - 0.95 E9/L    Eosinophils Absolute 0.06 0.05 - 0.50 E9/L    Basophils Absolute 0.05 0.00 - 0.20 E9/L   Comprehensive Metabolic Panel w/ Reflex to MG   Result Value Ref Range    Sodium 134 132 - 146 mmol/L    Potassium reflex Magnesium 2.7 (LL) 3.5 - 5.0 mmol/L    Chloride 93 (L) 98 - 107 mmol/L    CO2 26 22 - 29 mmol/L    Anion Gap 15 7 - 16 mmol/L    Glucose 123 (H) 74 - 99 mg/dL    BUN 5 (L) 6 - 20 mg/dL    CREATININE 0.6 0.5 - 1.0 mg/dL    GFR Non-African American >60 >=60 mL/min/1.73    GFR African American >60     Calcium 8.0 (L) 8.6 - 10.2 mg/dL    Total Protein 7.5 6.4 - 8.3 g/dL    Albumin 3.8 3.5 - 5.2 g/dL    Total Bilirubin 0.7 0.0 - 1.2 mg/dL    Alkaline Phosphatase 101 35 - 104 U/L    ALT 18 0 - 32 U/L    AST 46 (H) 0 - 31 U/L   Magnesium   Result Value Ref Range    Magnesium 1.3 (L) 1.6 - 2.6 mg/dL   TSH without Reflex   Result Value Ref Range    TSH 2.590 0.270 - 4.200 uIU/mL   Troponin   Result Value Ref Range    Troponin <0.01 0.00 - 0.03 ng/mL   HCG, SERUM, QUALITATIVE   Result Value Ref Range    hCG Qual NEGATIVE NEGATIVE       RADIOLOGY:  Interpreted by Radiologist.  XR CHEST (2 VW)   Final Result   No acute process. CT HEAD WO CONTRAST    (Results Pending)       ------------------------- NURSING NOTES AND VITALS REVIEWED ---------------------------   The nursing notes within the ED encounter and vital signs as below have been reviewed.    BP (!) 152/84   Pulse 71   Temp 97.7 °F (36.5 °C)   Resp 20   SpO2 99%   Oxygen Saturation Interpretation: Normal      ---------------------------------------------------PHYSICAL EXAM--------------------------------------    Physical Exam  Vitals signs and nursing note reviewed. Constitutional:       General: She is not in acute distress. Appearance: Normal appearance. She is well-developed. She is not ill-appearing. HENT:      Head: Normocephalic and atraumatic. Mouth/Throat:      Mouth: Mucous membranes are moist.      Pharynx: Oropharynx is clear. Neck:      Musculoskeletal: Normal range of motion and neck supple. No muscular tenderness. Vascular: No JVD. Trachea: No tracheal deviation. Cardiovascular:      Rate and Rhythm: Normal rate and regular rhythm. Heart sounds: Normal heart sounds. No murmur. Pulmonary:      Effort: Pulmonary effort is normal. No respiratory distress. Breath sounds: Normal breath sounds. Abdominal:      General: Bowel sounds are normal. There is no distension. Palpations: Abdomen is soft. Tenderness: There is no abdominal tenderness. Musculoskeletal: Normal range of motion. General: No tenderness or deformity. Skin:     General: Skin is warm and dry. Capillary Refill: Capillary refill takes less than 2 seconds. Coloration: Skin is not pale. Findings: No erythema. Neurological:      Mental Status: She is alert and oriented to person, place, and time. Mental status is at baseline. Motor: No weakness. Psychiatric:         Mood and Affect: Mood normal.         Behavior: Behavior normal.         Thought Content:  Thought content normal.      Comments: Patient tearful on exam.            ------------------------------ ED COURSE/MEDICAL DECISION MAKING----------------------  Medications   0.9 % sodium chloride bolus (0 mLs Intravenous Stopped 3/8/21 2108)   magnesium sulfate 2000 mg in 50 mL IVPB premix (0 mg Intravenous Stopped 3/8/21 2306)   potassium chloride 10 mEq/100 mL IVPB (Peripheral Line) (10 mEq PROVIDER NOTE         Gwen Clark PA-C  03/08/21 [id number]      ATTENDING PROVIDER ATTESTATION:     I have personally performed and/or participated in the history, exam, medical decision making, and procedures and agree with all pertinent clinical information. I have also reviewed and agree with the past medical, family and social history unless otherwise noted. My findings/Plan: Presenting here because of muscle cramps as well as hand cramps as well as numbness around her mouth. Patient reports to me that she thought she was having a stroke. Patient reported blood pressure was elevated. She reports no active chest pain she reports recent history of Covid and reportedly had Covid about 4 weeks ago. Patient reports since then she is not been feeling herself. Patient reporting also bleeding vaginally for last several weeks. Patient reporting no abdominal pain she reports no active diarrhea she reports she vomited once today. Patient reporting no photophobia. Patient here is awake alert oriented x3 heart lung exam normal abdomen is soft nontender she is neurological intact pulses are intact distally patient stroke scale is 0. She has no facial droop. Labs and EKG noted as well as chest x-ray and CT of the head were ordered due to her complaint. Patient noted be hypokalemic and hypomagnesemic. Patient was medicated for her abnormal labs.   Patient will be admitted to monitored bed we did speak to family practice resident as well as attending       Rc Adorno MD  03/08/21 [id number] Apolonia Castellon MD  03/09/21 [id number]

## 2021-03-09 NOTE — PROGRESS NOTES
Spoke with Dr. Mackenzie Baxter (OBGYN) and he stated to have pt schedule an appt with her GYN or with him as an outpatient.

## 2021-03-09 NOTE — CARE COORDINATION
Spoke with patient. She is from home, lives alone, typically works and manages all of her own needs. No assistive devices in the home. No oxygen or nebulizer in the home. PCP is Dr. Debbie Smith. Family will provide transport home. No other needs noted. For questions I can be reached at [phone].  Alan Sparrow [facility]

## 2021-03-09 NOTE — DISCHARGE SUMMARY
times daily for 15 days        CONTINUE taking these medications    albuterol sulfate  (90 Base) MCG/ACT inhaler  Commonly known as: Ventolin HFA  Inhale 2 puffs into the lungs every 4-6 hours as needed for Wheezing or Shortness of Breath     benzonatate 100 MG capsule  Commonly known as: TESSALON  Take 1-2 capsules by mouth 3 times daily as needed for Cough     metoprolol tartrate 50 MG tablet  Commonly known as: Lopressor  Take 1 tablet by mouth 2 times daily        STOP taking these medications    chlorthalidone 25 MG tablet  Commonly known as: HYGROTON           Where to Get Your Medications      These medications were sent to [facility] [address]    Phone: [phone]   · amLODIPine 10 MG tablet  · potassium chloride 20 MEQ extended release tablet         Consults:  gynecology    Significant Diagnostic Studies:      Labs:  Na/K/Cl/CO2:  139/3.3/101/28 (03/09 1438)  BUN/Cr/glu/ALT/AST/amyl/lip:  3/0.7/--/--/--/--/-- (03/09 1438)  WBC/Hgb/Hct/Plts:  --/9.5/29.2/-- (03/09 1039)  [unfilled]  CrCl cannot be calculated (Unknown ideal weight.). New Imaging:  Xr Chest (2 Vw)    Result Date: 3/8/2021  EXAMINATION: TWO XRAY VIEWS OF THE CHEST 3/8/2021 10:09 pm COMPARISON: None. HISTORY: ORDERING SYSTEM PROVIDED HISTORY: sob TECHNOLOGIST PROVIDED HISTORY: Reason for exam:->sob What reading provider will be dictating this exam?->CRC FINDINGS: The lungs are without acute focal process. There is no effusion or pneumothorax. The cardiomediastinal silhouette is without acute process. The osseous structures are without acute process. No acute process.      Xr Chest (2 Vw)    Result Date: 2/25/2021  EXAMINATION: TWO XRAY VIEWS OF THE CHEST 2/25/2021 8:14 am COMPARISON: 28 June 2017 HISTORY: ORDERING SYSTEM PROVIDED HISTORY: COVID-19 TECHNOLOGIST PROVIDED HISTORY: Reason for exam:->SOB without Chest Pain What reading provider will be dictating this exam?->CRC FINDINGS: The lungs are without acute focal process. There is no effusion or pneumothorax. The cardiomediastinal silhouette is without acute process. The osseous structures are without acute process. No acute process. Us Non Ob Transvaginal    Result Date: 3/9/2021  EXAMINATION: PELVIC ULTRASOUND 3/9/2021 TECHNIQUE: Transvaginal pelvic ultrasound was performed. Color Doppler evaluation was performed. COMPARISON: None HISTORY: Abnormal bleeding in a premenopausal patient FINDINGS: Measurements: Uterus:  9.1 x 4.7 x 4.5 cm Endometrial stripe:  6 mm Right Ovary:  Not measured Left Ovary:  Not measured Ultrasound Findings: Uterus: Uterus demonstrates normal myometrial echotexture. Nabothian cysts are noted in the cervix. Endometrial stripe: If the patient is premenopausal, the endometrium is within normal limits in thickness. Right Ovary: Not able to be visualized Left Ovary: Not able to be visualized Free Fluid: No evidence of free fluid. The endometrium is within normal limits in thickness for a premenopausal patient     Us Dup Abd Pel Retro Scrot Complete    Result Date: 3/9/2021  Patient MRN:  [id number] : 1970 Age: 48 years Gender: Female Order Date:  3/9/2021 9:05 AM EXAM: US RETROPERITONEAL NICOLE, US DUP ABD PEL RETRO SCROT COMPLETE NUMBER OF IMAGES:  71 INDICATION:  HTN, persistent hypokalemia HTN, persistent hypokalemia What reading provider will be dictating this exam?->[facility] COMPARISON: None The right kidney is 10.4 x 4.9 x 4.7 cm The left kidney  is 10.9 x 5.7 x 5.2 cm There is no mass identified. There is no hydronephrosis identified. There is no calculus identified. The bladder is unremarkable. Velocity within the aorta measure 113.6 cm/sec. Velocities in the right renal artery measure 89.9 cm/sec,  renal aortic ratio is 0.8. Velocities within the left renal artery measure 71.3 cm/sec, renal aortic ratio on the left is 0.6.      1.  No evidence for hemodynamically significant renal artery stenosis. Us Retroperitoneal Romaine    Result Date: 3/9/2021  Patient MRN:  [id number] : 1970 Age: 48 years Gender: Female Order Date:  3/9/2021 9:05 AM EXAM: US RETROPERITONEAL ROMAINE, US DUP ABD PEL RETRO SCROT COMPLETE NUMBER OF IMAGES:  71 INDICATION:  HTN, persistent hypokalemia HTN, persistent hypokalemia What reading provider will be dictating this exam?->[facility] COMPARISON: None The right kidney is 10.4 x 4.9 x 4.7 cm The left kidney  is 10.9 x 5.7 x 5.2 cm There is no mass identified. There is no hydronephrosis identified. There is no calculus identified. The bladder is unremarkable. Velocity within the aorta measure 113.6 cm/sec. Velocities in the right renal artery measure 89.9 cm/sec,  renal aortic ratio is 0.8. Velocities within the left renal artery measure 71.3 cm/sec, renal aortic ratio on the left is 0.6. 1.  No evidence for hemodynamically significant renal artery stenosis. Treatments:   Potassium and magnesium supplementation    Discharge Exam:  General: Alert, cooperative, no acute distress. Weak appearing  HEENT: Normocephalic, atraumatic. PERRLA, conjunctiva/corneas clear, EOM's intact, no pallor or icterus. Oropharynx clear. Mucous membranes dry   Neck: Supple, symmetrical, trachea midline, no JVD. Thyroid non tender, no obvious masses. No cervical lymphadenopathy. Chest: No tenderness or deformity, full & symmetric excursion  Lung: Clear to auscultation bilaterally,  respirations unlabored. No rales/wheezing/rubs  Heart: RRR, S1 and S2 normal, no murmur, rub or gallop. DP pulses 2/4  Abdomen: SNTND, no masses, no organomegaly, no guarding, rebound or rigidity. Genital/Rectal: deferred  Extremities:  Extremities normal, atraumatic, no cyanosis or edema. Distal pulses equal bilaterally  Skin: Skin color, texture, turgor normal, no rashes or lesions  Musculoskeletal: No joint swelling, no muscle tenderness.  Normal ROM in extremities. Neurologic: Alert & Oriented; CNII-XII intact; Normal and symmetric strength in UEs and LEs; Sensation intact. No cerebellar signs  Psychiatric: appropriate affect. Intact judgment and insight. Disposition:   home    Future Appointments   Date Time Provider Elly Blevins   3/19/2021  2:00 PM MD Cem Storm [facility]     -Follow-up on PTH, renin/angiotensin  -Patient did have BP medications adjusted while hospitalized  -Follow BMP with Mg    More than 30 minutes was spent in preparation of this patient's discharge including, but not limited to, examination, preparation of documents, prescription preparation, counseling and coordination. Signed:  Lionel Clements DO  3/10/2021, 8:32 AM- PTH, vitamin and renin/angiontension etc- needs to be followed up        .

## 2021-03-09 NOTE — PROGRESS NOTES
Pt ordered ct head while in ER and refused. Notified Hugo on floor to call [phone]  if pt agrees to have scan.

## 2021-03-10 ENCOUNTER — TELEPHONE (OUTPATIENT)
Dept: FAMILY MEDICINE CLINIC | Age: 51
End: 2021-03-10

## 2021-03-10 NOTE — TELEPHONE ENCOUNTER
Patient called and stated she was released form hospital yesterday. She would like you to call her she has questions about her low blood count and she stated she is feeling light headed and faint when she gets up.   Please advise,  Thanks, Cherelle SERRA

## 2021-03-12 LAB — ALDOSTERONE: 7 NG/DL

## 2021-03-14 LAB — RENIN ACTIVITY: 0.3 NG/ML/HR

## 2021-03-16 ENCOUNTER — TELEPHONE (OUTPATIENT)
Dept: FAMILY MEDICINE CLINIC | Age: 51
End: 2021-03-16

## 2021-03-16 DIAGNOSIS — N93.9 ABNORMAL UTERINE BLEEDING (AUB): Primary | ICD-10-CM

## 2021-03-16 NOTE — TELEPHONE ENCOUNTER
Sushma called in and is stating that Dr. Pascual Garcia can not see her until the end of may, she is asking if you could refer her over to someone new.   Thank you

## 2021-03-19 ENCOUNTER — OFFICE VISIT (OUTPATIENT)
Dept: FAMILY MEDICINE CLINIC | Age: 51
End: 2021-03-19
Payer: COMMERCIAL

## 2021-03-19 VITALS
HEART RATE: 72 BPM | SYSTOLIC BLOOD PRESSURE: 142 MMHG | HEIGHT: 66 IN | DIASTOLIC BLOOD PRESSURE: 88 MMHG | OXYGEN SATURATION: 100 % | BODY MASS INDEX: 29.41 KG/M2 | WEIGHT: 183 LBS | TEMPERATURE: 98.9 F

## 2021-03-19 DIAGNOSIS — Z11.59 NEED FOR HEPATITIS C SCREENING TEST: ICD-10-CM

## 2021-03-19 DIAGNOSIS — Z09 HOSPITAL DISCHARGE FOLLOW-UP: Primary | ICD-10-CM

## 2021-03-19 DIAGNOSIS — E87.6 HYPOKALEMIA: ICD-10-CM

## 2021-03-19 DIAGNOSIS — Z13.220 LIPID SCREENING: ICD-10-CM

## 2021-03-19 DIAGNOSIS — Z11.4 ENCOUNTER FOR SCREENING FOR HIV: ICD-10-CM

## 2021-03-19 DIAGNOSIS — Z13.1 SCREENING FOR DIABETES MELLITUS: ICD-10-CM

## 2021-03-19 DIAGNOSIS — Z12.31 ENCOUNTER FOR SCREENING MAMMOGRAM FOR BREAST CANCER: ICD-10-CM

## 2021-03-19 DIAGNOSIS — N93.9 ABNORMAL UTERINE BLEEDING (AUB): ICD-10-CM

## 2021-03-19 LAB
ANION GAP SERPL CALCULATED.3IONS-SCNC: 11 MMOL/L (ref 7–16)
BUN BLDV-MCNC: 9 MG/DL (ref 6–20)
CALCIUM SERPL-MCNC: 9 MG/DL (ref 8.6–10.2)
CHLORIDE BLD-SCNC: 102 MMOL/L (ref 98–107)
CHOLESTEROL, TOTAL: 235 MG/DL (ref 0–199)
CO2: 29 MMOL/L (ref 22–29)
CREAT SERPL-MCNC: 0.8 MG/DL (ref 0.5–1)
GFR AFRICAN AMERICAN: >60
GFR NON-AFRICAN AMERICAN: >60 ML/MIN/1.73
GLUCOSE BLD-MCNC: 95 MG/DL (ref 74–99)
HBA1C MFR BLD: 5 % (ref 4–5.6)
HCT VFR BLD CALC: 26.5 % (ref 34–48)
HDLC SERPL-MCNC: 79 MG/DL
HEMOGLOBIN: 8.3 G/DL (ref 11.5–15.5)
LDL CHOLESTEROL CALCULATED: 137 MG/DL (ref 0–99)
MCH RBC QN AUTO: 27.8 PG (ref 26–35)
MCHC RBC AUTO-ENTMCNC: 31.3 % (ref 32–34.5)
MCV RBC AUTO: 88.6 FL (ref 80–99.9)
PDW BLD-RTO: 17.4 FL (ref 11.5–15)
PLATELET # BLD: 512 E9/L (ref 130–450)
PMV BLD AUTO: 10.1 FL (ref 7–12)
POTASSIUM SERPL-SCNC: 3.6 MMOL/L (ref 3.5–5)
RBC # BLD: 2.99 E12/L (ref 3.5–5.5)
SODIUM BLD-SCNC: 142 MMOL/L (ref 132–146)
TRIGL SERPL-MCNC: 95 MG/DL (ref 0–149)
VLDLC SERPL CALC-MCNC: 19 MG/DL
WBC # BLD: 6.9 E9/L (ref 4.5–11.5)

## 2021-03-19 PROCEDURE — 1111F DSCHRG MED/CURRENT MED MERGE: CPT | Performed by: FAMILY MEDICINE

## 2021-03-19 PROCEDURE — 99495 TRANSJ CARE MGMT MOD F2F 14D: CPT | Performed by: FAMILY MEDICINE

## 2021-03-19 PROCEDURE — 36415 COLL VENOUS BLD VENIPUNCTURE: CPT | Performed by: FAMILY MEDICINE

## 2021-03-19 PROCEDURE — 99212 OFFICE O/P EST SF 10 MIN: CPT | Performed by: FAMILY MEDICINE

## 2021-03-19 NOTE — PATIENT INSTRUCTIONS
Keep using albuterol as needed  Try leg elevation and compression socks for any ankle swelling, avoid chlorthalidone for now

## 2021-03-19 NOTE — PROGRESS NOTES
S: 48 y.o. female here for JONATHAN for weakness, hypokalemia. Improved, taking K supplement. VB, not able to get GYN appt until May so new referral placed y'day.   covid long haraleigh w/ sob, cough. HTN. Now on amlodipine 10, and BP better controlled. O: VS: BP (!) 142/88 (Site: Left Upper Arm, Position: Sitting, Cuff Size: Medium Adult)   Pulse 72   Temp 98.9 °F (37.2 °C) (Oral)   Ht 5' 6\" (1.676 m)   Wt 183 lb (83 kg)   SpO2 100%   BMI 29.54 kg/m²    General: NAD, alert and interacting appropriately. CV:  RRR, no gallops, rubs, or murmurs    Resp: CTAB   Abd:  Soft, nontender   Ext:  No edema    Impression: JONATHAN for hypokalemia 2/2 chlorthalidone, weakness. VB. HTN. S/p covid  Plan:   Bmp, Mg  Cbc. Refer to Dr. Mikey Miles  Continue albuterol post covid. Consider PFTs later if needed  CTM, continue norvasc 10 and metoprolol        Attending Physician Statement  I have discussed the case, including pertinent history and exam findings with the resident. I also have seen the patient and performed key portions of the examination. I agree with the documented assessment and plan.

## 2021-03-19 NOTE — LETTER
[facility]  [address] [place], [place] [id number]  Phone: [phone]  Fax: [phone]    Karla Stanton MD        March 19, 2021     Patient: Cain Hilliard   YOB: 1970   Date of Visit: 3/19/2021       To Whom it May Concern:    Cain Hilliard was seen in my clinic on 3/19/2021. She may return to work on 3/20 for 4 days of work per week. .    If you have any questions or concerns, please don't hesitate to call.     Sincerely,         Karla Stanton MD

## 2021-03-19 NOTE — PROGRESS NOTES
Post-Discharge Transitional Care Management Services or Hospital Follow Up      Dang Wheat   YOB: 1970    Date of Office Visit:  3/19/2021  Date of Hospital Admission: 3/8/21  Date of Hospital Discharge: 3/9/21  Readmission Risk Score(high >=14%.  Medium >=10%):No data recorded    Care management risk score Rising risk (score 2-5) and Complex Care (Scores >=6): 0     Non face to face  following discharge, date last encounter closed (first attempt may have been earlier): *No documented post hospital discharge outreach found in the last 14 days *No documented post hospital discharge outreach found in the last 14 days    Call initiated 2 business days of discharge: *No response recorded in the last 14 days     Patient Active Problem List   Diagnosis    HTN (hypertension)    Shin splints    Venous angioma    Left arm weakness    COVID-19    Hypokalemia    Abnormal uterine bleeding    Hypomagnesemia       Allergies   Allergen Reactions    Penicillins Hives and Itching       Medications listed as ordered at the time of discharge from [facility] Medication Instructions [facility]:[id number]    Printed on:03/20/21 8046   Medication Information                      albuterol sulfate HFA (VENTOLIN HFA) 108 (90 Base) MCG/ACT inhaler  Inhale 2 puffs into the lungs every 4-6 hours as needed for Wheezing or Shortness of Breath             amLODIPine (NORVASC) 10 MG tablet  Take 1 tablet by mouth daily             metoprolol tartrate (LOPRESSOR) 50 MG tablet  Take 1 tablet by mouth 2 times daily             potassium chloride (KLOR-CON M) 20 MEQ extended release tablet  Take 1 tablet by mouth 2 times daily for 15 days                   Medications marked \"taking\" at this time  Outpatient Medications Marked as Taking for the 3/19/21 encounter (Office Visit) with Maegan Medellin MD   Medication Sig Dispense Refill    amLODIPine (NORVASC) 10 MG tablet Take 1 tablet by mouth daily 30 tablet 3    potassium chloride (KLOR-CON M) 20 MEQ extended release tablet Take 1 tablet by mouth 2 times daily for 15 days 30 tablet 0    metoprolol tartrate (LOPRESSOR) 50 MG tablet Take 1 tablet by mouth 2 times daily 180 tablet 3    albuterol sulfate HFA (VENTOLIN HFA) 108 (90 Base) MCG/ACT inhaler Inhale 2 puffs into the lungs every 4-6 hours as needed for Wheezing or Shortness of Breath 1 Inhaler 0        Medications patient taking as of now reconciled against medications ordered at time of hospital discharge: Yes    Chief Complaint   Patient presents with    Follow-Up from [address] [place] [address] of Breath    Cough    Headache    Discuss Medications    Other      note for work       HPI    Inpatient course: Discharge summary reviewed- see chart. Interval history/Current status:     Back at work, still having some shortness of breath and dry cough. Occasional headache as well, symptoms slowly improving after COVID. No history of asthma or COPD, former smoker, smoked about 5 years. Still having vaginal bleeding, daily, changes lining every 12 hours. Was having regular periods before, no history bleeding disorders. Does have history of menorrhagia, treated with hormone therapy per pt. Needs new BP cuff to check at home, checks at work and is normotensive. Taking all BP meds and K supp, did take chlorthalidone as she states ankles were swollen. No headache, blurred vision, dizziness, syncope, chest pain. Review of Systems   Constitutional: Negative for chills and fever. Respiratory: Positive for cough and shortness of breath. Cardiovascular: Negative for chest pain and palpitations. Gastrointestinal: Negative for abdominal pain, nausea and vomiting. Genitourinary: Positive for vaginal bleeding. Negative for frequency, hematuria and vaginal discharge. Neurological: Positive for weakness. Negative for syncope.        Vitals:    03/19/21 1354 03/19/21 1357   BP: (!) 143/89 Yevgeniy Narvaez ) 142/88   Site: Left Upper Arm Left Upper Arm   Position: Sitting Sitting   Cuff Size: Medium Adult Medium Adult   Pulse: 72    Temp: 98.9 °F (37.2 °C)    TempSrc: Oral    SpO2: 100%    Weight: 183 lb (83 kg)    Height: 5' 6\" (1.676 m)      Body mass index is 29.54 kg/m². Wt Readings from Last 3 Encounters:   03/19/21 183 lb (83 kg)   03/04/21 170 lb (77.1 kg)   02/24/21 170 lb (77.1 kg)     BP Readings from Last 3 Encounters:   03/19/21 (!) 142/88   03/09/21 122/84   03/04/21 (!) 160/90       Physical Exam  Constitutional:       General: She is not in acute distress. Appearance: She is well-developed. HENT:      Head: Normocephalic and atraumatic. Eyes:      Conjunctiva/sclera: Conjunctivae normal.      Pupils: Pupils are equal, round, and reactive to light. Neck:      Musculoskeletal: Normal range of motion and neck supple. Thyroid: No thyromegaly. Trachea: No tracheal deviation. Cardiovascular:      Rate and Rhythm: Normal rate and regular rhythm. Heart sounds: Normal heart sounds. No murmur. Pulmonary:      Effort: Pulmonary effort is normal. No respiratory distress. Breath sounds: Normal breath sounds. No wheezing or rales. Abdominal:      General: Bowel sounds are normal. There is no distension. Palpations: Abdomen is soft. Tenderness: There is no abdominal tenderness. Musculoskeletal: Normal range of motion. Lymphadenopathy:      Cervical: No cervical adenopathy. Skin:     General: Skin is warm and dry. Capillary Refill: Capillary refill takes less than 2 seconds. Findings: No rash. Neurological:      Mental Status: She is alert and oriented to person, place, and time. Cranial Nerves: No cranial nerve deficit. Deep Tendon Reflexes: Reflexes normal.   Psychiatric:         Behavior: Behavior normal.         Assessment/Plan:  1.  Encounter for screening mammogram for breast cancer    - EL DIGITAL SCREEN BILATERAL PER PROTOCOL; Future    2. Abnormal uterine bleeding (AUB)    - CBC; Future    3. Hypokalemia    - BASIC METABOLIC PANEL; Future    4. Need for hepatitis C screening test    - HEPATITIS C ANTIBODY; Future    5. Screening for diabetes mellitus    - HEMOGLOBIN A1C; Future    6. Encounter for screening for HIV    - HIV Screen; Future    7. Lipid screening    - Lipid Panel; Future    8.  Hospital discharge follow-up        Will check labs today, consider resuming chlorthalidone with K supp if BP remains elevated  Urgent referral to OBGYN, office staff to assist with timely appt  Continue albuterol prn   Letter provided for work        Medical Decision Making: moderate complexity

## 2021-03-20 ASSESSMENT — ENCOUNTER SYMPTOMS
COUGH: 1
ABDOMINAL PAIN: 0
NAUSEA: 0
SHORTNESS OF BREATH: 1
VOMITING: 0

## 2021-03-22 ENCOUNTER — TELEPHONE (OUTPATIENT)
Dept: FAMILY MEDICINE CLINIC | Age: 51
End: 2021-03-22

## 2021-03-22 LAB — HIV-1 AND HIV-2 ANTIBODIES: NORMAL

## 2021-03-22 RX ORDER — FERROUS SULFATE 325(65) MG
325 TABLET ORAL
Qty: 30 TABLET | Refills: 1 | Status: SHIPPED
Start: 2021-03-22 | End: 2022-02-25

## 2021-03-22 NOTE — TELEPHONE ENCOUNTER
Pt called in today complaining of weakness still and requesting iron supplements. Pt also complaining of coughing not getting better, headaches as well and just not feeling like the covid symptoms are going away. Pt stated she wanted to talk to her pcp regarding these symptoms today I advised pt she could walk in to [facility] flu clinic and get checked there again for covid if she wanted to and could also be seen by a physician as well.

## 2021-03-22 NOTE — TELEPHONE ENCOUNTER
Pt is likely COVID long hauler and will continue to have symptoms for some time potentially. Should stay well hydrated and continue albuterol as necessary. Iron supp sent to pharmacy.

## 2021-03-24 LAB — HEPATITIS C ANTIBODY INTERPRETATION: NORMAL

## 2021-03-25 ENCOUNTER — TELEPHONE (OUTPATIENT)
Dept: FAMILY MEDICINE CLINIC | Age: 51
End: 2021-03-25

## 2021-03-25 ENCOUNTER — OFFICE VISIT (OUTPATIENT)
Dept: PRIMARY CARE CLINIC | Age: 51
End: 2021-03-25
Payer: COMMERCIAL

## 2021-03-25 VITALS
OXYGEN SATURATION: 100 % | HEIGHT: 67 IN | HEART RATE: 91 BPM | TEMPERATURE: 97.4 F | DIASTOLIC BLOOD PRESSURE: 62 MMHG | RESPIRATION RATE: 18 BRPM | WEIGHT: 186 LBS | SYSTOLIC BLOOD PRESSURE: 124 MMHG | BODY MASS INDEX: 29.19 KG/M2

## 2021-03-25 DIAGNOSIS — Z86.16 HISTORY OF 2019 NOVEL CORONAVIRUS DISEASE (COVID-19): Primary | ICD-10-CM

## 2021-03-25 LAB
Lab: NORMAL
PERFORMING INSTRUMENT: NORMAL
QC PASS/FAIL: NORMAL
SARS-COV-2, POC: NORMAL

## 2021-03-25 PROCEDURE — 99213 OFFICE O/P EST LOW 20 MIN: CPT | Performed by: NURSE PRACTITIONER

## 2021-03-25 PROCEDURE — 87426 SARSCOV CORONAVIRUS AG IA: CPT | Performed by: NURSE PRACTITIONER

## 2021-03-25 NOTE — PROGRESS NOTES
3/25/21  Aminata Lopez : 1970 Sex: female  Age 48 y.o. Subjective:  Chief Complaint   Patient presents with    Cough    Congestion    Fatigue    Shortness of Breath    Other     patient had covid in feb. she has not felt right since then       HPI:   Aminata Lopez , 48 y.o. female presents to the clinic for evaluation of chronic HOYT, fatigue, dry cough, and intermittent headache x 2 months. Patient is requesting a rapid Covid-19 test today for personal reasons. The patient reports symptoms started when she developed COVID-19 in 21. The patient reports she is following up with a PCP for chronic post COVID-19 symptoms. The patient reports unchanged symptoms over time. The patient denies any new ill exposure. The patient denies acute: acute loss of taste and smell, headache, sinus congestion, cough, sore throat, rash, and fever. The patient also denies acute: chest pain, abdominal pain, shortness of breath, and nausea / vomiting / diarrhea. ROS:   Unless otherwise stated in this report the patient's positive and negative responses for review of systems for constitutional, eyes, ENT, cardiovascular, respiratory, gastrointestinal, neurological, , musculoskeletal, and integument systems and related systems to the presenting problem are either stated in the history of present illness or were not pertinent or were negative for the symptoms and/or complaints related to the presenting medical problem. Positives and pertinent negatives as per HPI. All others reviewed and are negative. PMH:     Past Medical History:   Diagnosis Date    Hypertension        History reviewed. No pertinent surgical history.     Family History   Problem Relation Age of Onset    Stroke Father        Medications:     Current Outpatient Medications:     ferrous sulfate (IRON 325) 325 (65 Fe) MG tablet, Take 1 tablet by mouth daily (with breakfast), Disp: 30 tablet, Rfl: 1    amLODIPine (NORVASC) 10 MG tablet, Take 1 tablet by mouth daily, Disp: 30 tablet, Rfl: 3    metoprolol tartrate (LOPRESSOR) 50 MG tablet, Take 1 tablet by mouth 2 times daily, Disp: 180 tablet, Rfl: 3    albuterol sulfate HFA (VENTOLIN HFA) 108 (90 Base) MCG/ACT inhaler, Inhale 2 puffs into the lungs every 4-6 hours as needed for Wheezing or Shortness of Breath, Disp: 1 Inhaler, Rfl: 0    potassium chloride (KLOR-CON M) 20 MEQ extended release tablet, Take 1 tablet by mouth 2 times daily for 15 days, Disp: 30 tablet, Rfl: 0    Allergies: Allergies   Allergen Reactions    Penicillins Hives and Itching       Social History:     Social History     Tobacco Use    Smoking status: Former Smoker     Packs/day: 0.50     Years: 5.00     Pack years: 2.50     Types: Cigarettes    Smokeless tobacco: Never Used   Substance Use Topics    Alcohol use: Yes     Comment: very socially beer    Drug use: No       Patient lives at home. Physical Exam:     Vitals:    03/25/21 1533 03/25/21 1553   BP: (!) 146/101 124/62   Pulse: 91    Resp: 18    Temp: 97.4 °F (36.3 °C)    TempSrc: Temporal    SpO2: 100%    Weight: 186 lb (84.4 kg)    Height: 5' 6.5\" (1.689 m)        Physical Exam (PE)    Physical Exam  Constitutional:       Appearance: Normal appearance. HENT:      Head: Normocephalic. Right Ear: Tympanic membrane, ear canal and external ear normal.      Left Ear: Tympanic membrane, ear canal and external ear normal.      Nose: Nose normal.      Mouth/Throat:      Mouth: Mucous membranes are moist.      Pharynx: Oropharynx is clear. Eyes:      Pupils: Pupils are equal, round, and reactive to light. Neck:      Musculoskeletal: Normal range of motion and neck supple. Cardiovascular:      Rate and Rhythm: Normal rate and regular rhythm. Pulses: Normal pulses. Heart sounds: Normal heart sounds. Pulmonary:      Effort: Pulmonary effort is normal. No respiratory distress. Breath sounds: Normal breath sounds.  No wheezing, rhonchi or rales.   Abdominal:      General: Bowel sounds are normal.      Palpations: Abdomen is soft. Musculoskeletal: Normal range of motion. Lymphadenopathy:      Cervical: No cervical adenopathy. Skin:     General: Skin is warm and dry. Capillary Refill: Capillary refill takes less than 2 seconds. Neurological:      General: No focal deficit present. Mental Status: She is alert and oriented to person, place, and time. Psychiatric:         Mood and Affect: Mood normal.         Behavior: Behavior normal.          Testing:   (All laboratory and radiology results have been personally reviewed by myself)  Labs:  Results for orders placed or performed in visit on 03/25/21   POCT COVID-19, Antigen   Result Value Ref Range    SARS-COV-2, POC Not-Detected Not Detected    Lot Number [id number]     QC Pass/Fail pass     Performing Instrument BD Veritor        Imaging: All Radiology results interpreted by Radiologist unless otherwise noted. No orders to display       Assessment / Plan:   The patient's vitals, allergies, medications, and past medical history have been reviewed. Sushma was seen today for cough, congestion, fatigue, shortness of breath and other. Diagnoses and all orders for this visit:    History of 2019 novel coronavirus disease (COVID-19)  -     POCT COVID-19, Antigen        - Disposition: Home    - Educational material printed for patient's review and were included in patient instructions. After Visit Summary and given to patient at the end of visit. - Encouraged oral fluids and rest. Discussed symptomatic treatments with patient today including Tylenol prn for fever / pain. Schedule a follow-up with PCP in 2-3 days. Red flag symptoms were discussed with the patient today. The patient is directed to go the ED if symptoms change or worsen. Pt verbalizes understanding and is in agreement with plan of care. All questions answered.     SIGNATURE: MEHUL Sawant-CNP    *NOTE: This report was transcribed using voice recognition software. Every effort was made to ensure accuracy; however, inadvertent computerized transcription errors may be present.

## 2021-03-25 NOTE — PATIENT INSTRUCTIONS
Patient Education        Learning About Coronavirus [phone]  What is coronavirus (COVID-19)? COVID-19 is a disease caused by a new type of coronavirus. This illness was first found in December 2019. It has since spread worldwide. Coronaviruses are a large group of viruses. They cause the common cold. They also cause more serious illnesses like Middle East respiratory syndrome (MERS) and severe acute respiratory syndrome (SARS). COVID-19 is caused by a novel coronavirus. That means it's a new type that has not been seen in people before. What are the symptoms? Coronavirus (COVID-19) symptoms may include:  · Fever. · Cough. · Trouble breathing. · Chills or repeated shaking with chills. · Muscle pain. · Headache. · Sore throat. · New loss of taste or smell. · Vomiting. · Diarrhea. In severe cases, COVID-19 can cause pneumonia and make it hard to breathe without help from a machine. It can cause death. How is it diagnosed? COVID-19 is diagnosed with a viral test. This may also be called a PCR test or antigen test. It looks for evidence of the virus in your breathing passages or lungs (respiratory system). The test is most often done on a sample from the nose, throat, or lungs. It's sometimes done on a sample of saliva. One way a sample is collected is by putting a long swab into the back of your nose. How is it treated? Mild cases of COVID-19 can be treated at home. Serious cases need treatment in the hospital. Treatment may include medicines to reduce symptoms, plus breathing support such as oxygen therapy or a ventilator. Some people may be placed on their belly to help their oxygen levels. Treatments that may help people who have COVID-19 include:  Antiviral medicines. These medicines treat viral infections. Remdesivir is an example. Immune-based therapy. These medicines help the immune system fight COVID-19. One example is bamlanivimab. It's a monoclonal antibody. Blood thinners. These medicines help prevent blood clots. People with severe illness are at risk for blood clots. How can you protect yourself and others? The best way to protect yourself from getting sick is to:  · Avoid areas where there is an outbreak. · Avoid contact with people who may be infected. · Avoid crowds and try to stay at least 6 feet away from other people. · Wash your hands often, especially after you cough or sneeze. Use soap and water, and scrub for at least 20 seconds. If soap and water aren't available, use an alcohol-based hand . · Avoid touching your mouth, nose, and eyes. To help avoid spreading the virus to others:  · Stay home if you are sick or have been exposed to the virus. Don't go to school, work, or public areas. And don't use public transportation, ride-shares, or taxis unless you have no choice. · Wear a cloth face cover if you have to go to public areas. · Cover your mouth with a tissue when you cough or sneeze. Then throw the tissue in the trash and wash your hands right away. · If you're sick:  ? Leave your home only if you need to get medical care. But call the doctor's office first so they know you're coming. And wear a face cover. ? Wear the face cover whenever you're around other people. It can help stop the spread of the virus when you cough or sneeze. ? Limit contact with pets and people in your home. If possible, stay in a separate bedroom and use a separate bathroom. ? Clean and disinfect your home every day. Use household  and disinfectant wipes or sprays. Take special care to clean things that you grab with your hands. These include doorknobs, remote controls, phones, and handles on your refrigerator and microwave. And don't forget countertops, tabletops, bathrooms, and computer keyboards. When should you call for help? Call 911 anytime you think you may need emergency care.  For example, call if you have life-threatening symptoms, such as:    · You have severe trouble breathing. (You can't talk at all.)     · You have constant chest pain or pressure.     · You are severely dizzy or lightheaded.     · You are confused or can't think clearly.     · Your face and lips have a blue color.     · You pass out (lose consciousness) or are very hard to wake up. Call your doctor now or seek immediate medical care if:    · You have moderate trouble breathing. (You can't speak a full sentence.)     · You are coughing up blood (more than about 1 teaspoon).     · You have signs of low blood pressure. These include feeling lightheaded; being too weak to stand; and having cold, pale, clammy skin. Watch closely for changes in your health, and be sure to contact your doctor if:    · Your symptoms get worse.     · You are not getting better as expected. Call before you go to the doctor's office. Follow their instructions. And wear a cloth face cover. Current as of: December 18, 2020               Content Version: 12.8  © 2006-2021 [employer]. Care instructions adapted under license by [facility] ([facility]). If you have questions about a medical condition or this instruction, always ask your healthcare professional. [place] [address] any warranty or liability for your use of this information.

## 2021-03-25 NOTE — TELEPHONE ENCOUNTER
Patient called and wanted the results of labs. Patient also stated that the referral to the OBGYN never contacted her. She stated she called and left msg. With them on response. I printed referral and will fax to them again. Patient would like you to give her a call.   Thanks, Cherelle SERRA

## 2021-03-26 NOTE — TELEPHONE ENCOUNTER
Attempted to call pt x3, no answer. Please notify pt that K is normal and Hgb is stable.  Please also check on status of referral to OBGYN thank you

## 2021-03-29 ENCOUNTER — TELEPHONE (OUTPATIENT)
Dept: OBGYN | Age: 51
End: 2021-03-29

## 2021-03-29 NOTE — TELEPHONE ENCOUNTER
Patient was referred to [facility] from [facility] for Dysfunctional Uterine Bleeding. Sent the referral to Dr. Marily Redman asking when or how soon the patient needed to be seen based on notes and labs, etc. In her chart. Per Dr. Marily Redman, patient can have an appointment within the next 2 weeks or so. May need to see Dr. Jake Caal for a [facility]  consult. Called patient today to schedule an appointment. Patient took the office phone number and said she would call back once she looked at her work schedule.     -Velma, 3/29/21

## 2021-04-01 ENCOUNTER — TELEPHONE (OUTPATIENT)
Dept: FAMILY MEDICINE CLINIC | Age: 51
End: 2021-04-01

## 2021-04-01 NOTE — TELEPHONE ENCOUNTER
Called patient back for more information per     Has pain in legs, fatigue  She is wondering if her blood counts are ok .   Patient is also requesting a nebulizer to help her with her coughing   If applicable  Fax to [facility] or [facility]

## 2021-04-01 NOTE — TELEPHONE ENCOUNTER
Patient called requesting a phone call from you. Patient does not feel well and said she doesn't have energy to go anywhere to be seen.  Asked if you would call her at [phone]    Thank you

## 2021-04-06 ENCOUNTER — TELEPHONE (OUTPATIENT)
Dept: FAMILY MEDICINE CLINIC | Age: 51
End: 2021-04-06

## 2021-04-06 ENCOUNTER — TELEPHONE (OUTPATIENT)
Dept: ADMINISTRATIVE | Age: 51
End: 2021-04-06

## 2021-04-06 DIAGNOSIS — U07.1 DYSPNEA DUE TO COVID-19: Primary | ICD-10-CM

## 2021-04-06 DIAGNOSIS — R25.2 MUSCLE CRAMPS: Primary | ICD-10-CM

## 2021-04-06 DIAGNOSIS — R06.00 DYSPNEA DUE TO COVID-19: Primary | ICD-10-CM

## 2021-04-06 NOTE — TELEPHONE ENCOUNTER
Has had similar symptoms in past when K was low, I have placed orders for her to at least have labs drawn today.  Please also offer pt same day appt if at all possible today or tomorrow thank you

## 2021-04-06 NOTE — TELEPHONE ENCOUNTER
Pt called concerning her lasting effects from Covid. [facility] [place] spoke with the office and Elaine Milan did call Dr Claudeen Doheny, he will put in a referral for a Pulmonologist, pt is aware of this, but she did state he has started to have \"weird \" leg pains x  1 week, feels like she's worked out and has muscle pains, but has only really walked to the mail box, has extreme fatigue. Pt would like a call back concerning this new issue.

## 2021-04-09 ENCOUNTER — OFFICE VISIT (OUTPATIENT)
Dept: FAMILY MEDICINE CLINIC | Age: 51
End: 2021-04-09
Payer: COMMERCIAL

## 2021-04-09 VITALS
DIASTOLIC BLOOD PRESSURE: 62 MMHG | BODY MASS INDEX: 28.88 KG/M2 | RESPIRATION RATE: 18 BRPM | HEIGHT: 67 IN | TEMPERATURE: 99.5 F | HEART RATE: 62 BPM | OXYGEN SATURATION: 99 % | SYSTOLIC BLOOD PRESSURE: 110 MMHG | WEIGHT: 184 LBS

## 2021-04-09 DIAGNOSIS — F33.9 RECURRENT MAJOR DEPRESSIVE DISORDER, REMISSION STATUS UNSPECIFIED (HCC): ICD-10-CM

## 2021-04-09 DIAGNOSIS — R25.2 MUSCLE CRAMPS: Primary | ICD-10-CM

## 2021-04-09 DIAGNOSIS — R25.2 MUSCLE CRAMPS: ICD-10-CM

## 2021-04-09 DIAGNOSIS — R51.9 DAILY HEADACHE: ICD-10-CM

## 2021-04-09 DIAGNOSIS — Z86.16 HISTORY OF COVID-19: ICD-10-CM

## 2021-04-09 LAB
ANION GAP SERPL CALCULATED.3IONS-SCNC: 17 MMOL/L (ref 7–16)
BUN BLDV-MCNC: 6 MG/DL (ref 6–20)
CALCIUM SERPL-MCNC: 8.6 MG/DL (ref 8.6–10.2)
CHLORIDE BLD-SCNC: 97 MMOL/L (ref 98–107)
CO2: 21 MMOL/L (ref 22–29)
CREAT SERPL-MCNC: 0.7 MG/DL (ref 0.5–1)
GFR AFRICAN AMERICAN: >60
GFR NON-AFRICAN AMERICAN: >60 ML/MIN/1.73
GLUCOSE BLD-MCNC: 105 MG/DL (ref 74–99)
HCT VFR BLD CALC: 30.3 % (ref 34–48)
HEMOGLOBIN: 9.3 G/DL (ref 11.5–15.5)
MAGNESIUM: 1.9 MG/DL (ref 1.6–2.6)
MCH RBC QN AUTO: 25.3 PG (ref 26–35)
MCHC RBC AUTO-ENTMCNC: 30.7 % (ref 32–34.5)
MCV RBC AUTO: 82.3 FL (ref 80–99.9)
PDW BLD-RTO: 19.3 FL (ref 11.5–15)
PLATELET # BLD: 391 E9/L (ref 130–450)
PMV BLD AUTO: 9.5 FL (ref 7–12)
POTASSIUM SERPL-SCNC: 3.9 MMOL/L (ref 3.5–5)
RBC # BLD: 3.68 E12/L (ref 3.5–5.5)
SODIUM BLD-SCNC: 135 MMOL/L (ref 132–146)
WBC # BLD: 6.9 E9/L (ref 4.5–11.5)

## 2021-04-09 PROCEDURE — 99212 OFFICE O/P EST SF 10 MIN: CPT | Performed by: FAMILY MEDICINE

## 2021-04-09 PROCEDURE — 99213 OFFICE O/P EST LOW 20 MIN: CPT | Performed by: FAMILY MEDICINE

## 2021-04-09 PROCEDURE — 36415 COLL VENOUS BLD VENIPUNCTURE: CPT | Performed by: FAMILY MEDICINE

## 2021-04-09 ASSESSMENT — PATIENT HEALTH QUESTIONNAIRE - PHQ9
6. FEELING BAD ABOUT YOURSELF - OR THAT YOU ARE A FAILURE OR HAVE LET YOURSELF OR YOUR FAMILY DOWN: 0
4. FEELING TIRED OR HAVING LITTLE ENERGY: 3
10. IF YOU CHECKED OFF ANY PROBLEMS, HOW DIFFICULT HAVE THESE PROBLEMS MADE IT FOR YOU TO DO YOUR WORK, TAKE CARE OF THINGS AT HOME, OR GET ALONG WITH OTHER PEOPLE: 3
SUM OF ALL RESPONSES TO PHQ QUESTIONS 1-9: 10
SUM OF ALL RESPONSES TO PHQ9 QUESTIONS 1 & 2: 6
8. MOVING OR SPEAKING SO SLOWLY THAT OTHER PEOPLE COULD HAVE NOTICED. OR THE OPPOSITE, BEING SO FIGETY OR RESTLESS THAT YOU HAVE BEEN MOVING AROUND A LOT MORE THAN USUAL: 0
5. POOR APPETITE OR OVEREATING: 0

## 2021-04-09 ASSESSMENT — COLUMBIA-SUICIDE SEVERITY RATING SCALE - C-SSRS: 2. HAVE YOU ACTUALLY HAD ANY THOUGHTS OF KILLING YOURSELF?: NO

## 2021-04-09 NOTE — PROGRESS NOTES
S: 48 y.o. female for follow up, had COVID in February, not hospitalized, but prolonged course. Fatigued. Muscle cramps with ambulation. Headaches, daily headaches, happening overnight, can be worse with lying down since COVID. No difficulty speaking or swallowing. No numbness/weakness, no history of migraines. History of AUB. Had [address]. Bleeding stopped; did not yet see Gyn, but plans to follow up there soon. Using albuterol TID. Has been referred to pulm. Dry cough. Hydration oral, doing well. Depression symptoms. No SI or HI.    O: VS: /62   Pulse 62   Temp 99.5 °F (37.5 °C) (Oral)   Resp 18   Ht 5' 6.5\" (1.689 m)   Wt 184 lb (83.5 kg)   LMP 01/31/2021 (Exact Date)   SpO2 99%   BMI 29.25 kg/m²    General: NAD, appropriate affect and grooming   Neuro:  No deficits, good MS and sensation    CV:  RRR, no gallops, rubs, or murmurs   Resp: CTAB   Abd:  Soft, nontender   Ext:  No edema  Impression: Daily headache, unremitting. Myalgias. Long COVID. Depression. Plan: Recheck labs, CBC, BMP, Mg.  Concerns for HA, possible cerebral venous sinus thrombus, MRI/MRV. Psychology referral, and sertraline. RTO 4 weeks or sooner prn. Attending Physician Statement  I have discussed the case, including pertinent history and exam findings with the resident. I agree with the documented assessment and plan.

## 2021-04-09 NOTE — PATIENT INSTRUCTIONS
Take half tablet of zoloft for first 5 days then take full tablet if tolerating  Schedule with counselor

## 2021-04-09 NOTE — PROGRESS NOTES
[address] [place] [address]  FAMILY MEDICINE RESIDENCY PROGRAM  DATE OF VISIT : 2021    Patient : Lu Merlos   Age : 48 y.o.  : 1970   MRN : [id number]   ______________________________________________________________________    Chief Complaint :   Chief Complaint   Patient presents with    Cough     dry  since Febuary ( She had Covid)    Fatigue    Depression     PHQ-9 score 10 positive screen        HPI : Lu Merlos is 48 y.o. female who presented to the clinic today for above cc.      still having some shortness of breath and dry cough. Albuterol about 3 times a day. Was back at work but supervisor told her she was not ready to come back. No history of asthma or COPD, former smoker, smoked about 5 years. Referred to pulm earlier this week.      Still having daily headaches post COVID, waking her up with it occasionally, worse with laying down. Towards top of head. No nausea. No photophobia, no migraine history. Having occasional muscle cramps in the thighs and down legs. Occurs with walking, has to stop and sit down. Still getting worn out easily, no numbness. No difficulty speaking or swallowing. Depression: PHQ 10, endorses feeling depressed. Tearfulness. Not sleeping well but always tired. No SI/HI. Has been on meds in the past. Was on prozac in the past, though did not take long enough. Does not see a counselor. Willing to try meds and counseling again. AUB: states bleeding has now subsided. Awaiting GYN eval.     Past Medical History :  Past Medical History:   Diagnosis Date    Hypertension      No past surgical history on file.       Review of Systems :    ROS - Per HPI   ______________________________________________________________________    Physical Exam :    Vitals: /62   Pulse 62   Temp 99.5 °F (37.5 °C) (Oral)   Resp 18   Ht 5' 6.5\" (1.689 m)   Wt 184 lb (83.5 kg)   LMP 2021 (Exact Date)   SpO2 99%   BMI 29.25 kg/m²   GENERAL: Alert, cooperative, no acute distress. CHEST: No tenderness or deformity, full & symmetric excursion  LUNG: Clear to auscultation bilaterally,  respirations unlabored. No rales/wheezing/rubs  HEART: RRR, S1 and S2 normal, no murmur, rub or gallop. DP pulses 2/4  ABDOMEN: SNTND, no masses, no organomegaly, no guarding, rebound or rigidity. EXTREMITIES:  Extremities normal, atraumatic, no cyanosis or edema. Distal pulses equal bilaterally  Psych: tearful, flat affect  Neuro: no FNDs,  Strength 5/5 all ext  ______________________________________________________________________    Assessment & Plan :     Diagnosis Orders   1. Muscle cramps  BASIC METABOLIC PANEL    MAGNESIUM    CBC   2. History of COVID-19     3. Daily headache  MRI BRAIN W WO CONTRAST    MRV HEAD W WO CONTRAST   4. Recurrent major depressive disorder, remission status unspecified (HCC)  sertraline (ZOLOFT) 50 MG tablet     Ongoing dyspnea and fatigue, likely COVID \"long hauler\". Continue albuterol prn. Referred to pulm already. Daily headaches, red flags for waking up at night and worse with lying down, obtain MRI/MRV to r/o thrombosis d/t recent COVID and presumed hypercoag state. Check labs for muscle cramps, history of hypoK with similar symptoms. Has been taking K supp. Start zoloft for depression, given area counseling agencies to call and establish with.      RTC 4-6 weeks for f/u depression, post-COVID       Skinny Hines MD PGY-2    Discussed with: Dr. Tova Barney

## 2021-04-16 ENCOUNTER — APPOINTMENT (OUTPATIENT)
Dept: CT IMAGING | Age: 51
End: 2021-04-16
Payer: COMMERCIAL

## 2021-04-16 ENCOUNTER — APPOINTMENT (OUTPATIENT)
Dept: GENERAL RADIOLOGY | Age: 51
End: 2021-04-16
Payer: COMMERCIAL

## 2021-04-16 ENCOUNTER — HOSPITAL ENCOUNTER (EMERGENCY)
Age: 51
Discharge: HOME | End: 2021-04-16
Attending: EMERGENCY MEDICINE
Payer: COMMERCIAL

## 2021-04-16 VITALS
WEIGHT: 186 LBS | BODY MASS INDEX: 29.19 KG/M2 | DIASTOLIC BLOOD PRESSURE: 94 MMHG | HEART RATE: 82 BPM | OXYGEN SATURATION: 100 % | TEMPERATURE: 99.5 F | RESPIRATION RATE: 18 BRPM | SYSTOLIC BLOOD PRESSURE: 166 MMHG | HEIGHT: 67 IN

## 2021-04-16 DIAGNOSIS — R51.9 CHRONIC INTRACTABLE HEADACHE, UNSPECIFIED HEADACHE TYPE: ICD-10-CM

## 2021-04-16 DIAGNOSIS — R00.2 PALPITATIONS: ICD-10-CM

## 2021-04-16 DIAGNOSIS — G89.29 CHRONIC INTRACTABLE HEADACHE, UNSPECIFIED HEADACHE TYPE: ICD-10-CM

## 2021-04-16 DIAGNOSIS — M79.10 MYALGIA: ICD-10-CM

## 2021-04-16 DIAGNOSIS — R55 NEAR SYNCOPE: Primary | ICD-10-CM

## 2021-04-16 LAB
ANION GAP SERPL CALCULATED.3IONS-SCNC: 14 MMOL/L (ref 7–16)
BACTERIA: ABNORMAL /HPF
BILIRUBIN URINE: NEGATIVE
BLOOD, URINE: ABNORMAL
BUN BLDV-MCNC: 5 MG/DL (ref 6–20)
CALCIUM SERPL-MCNC: 8.7 MG/DL (ref 8.6–10.2)
CHLORIDE BLD-SCNC: 99 MMOL/L (ref 98–107)
CLARITY: CLEAR
CO2: 25 MMOL/L (ref 22–29)
COLOR: YELLOW
CREAT SERPL-MCNC: 0.7 MG/DL (ref 0.5–1)
EKG ATRIAL RATE: 84 BPM
EKG P AXIS: 79 DEGREES
EKG P-R INTERVAL: 170 MS
EKG Q-T INTERVAL: 402 MS
EKG QRS DURATION: 64 MS
EKG QTC CALCULATION (BAZETT): 475 MS
EKG R AXIS: 50 DEGREES
EKG T AXIS: 41 DEGREES
EKG VENTRICULAR RATE: 84 BPM
GFR AFRICAN AMERICAN: >60
GFR NON-AFRICAN AMERICAN: >60 ML/MIN/1.73
GLUCOSE BLD-MCNC: 101 MG/DL (ref 74–99)
GLUCOSE URINE: NEGATIVE MG/DL
HCG, URINE, POC: NEGATIVE
HCT VFR BLD CALC: 26.8 % (ref 34–48)
HEMOGLOBIN: 8.3 G/DL (ref 11.5–15.5)
KETONES, URINE: NEGATIVE MG/DL
LEUKOCYTE ESTERASE, URINE: NEGATIVE
Lab: NORMAL
MCH RBC QN AUTO: 24.6 PG (ref 26–35)
MCHC RBC AUTO-ENTMCNC: 31 % (ref 32–34.5)
MCV RBC AUTO: 79.3 FL (ref 80–99.9)
NEGATIVE QC PASS/FAIL: NORMAL
NITRITE, URINE: NEGATIVE
PDW BLD-RTO: 19.9 FL (ref 11.5–15)
PH UA: 7 (ref 5–9)
PLATELET # BLD: 474 E9/L (ref 130–450)
PMV BLD AUTO: 9.8 FL (ref 7–12)
POSITIVE QC PASS/FAIL: NORMAL
POTASSIUM SERPL-SCNC: 3.8 MMOL/L (ref 3.5–5)
PROTEIN UA: NEGATIVE MG/DL
RBC # BLD: 3.38 E12/L (ref 3.5–5.5)
RBC UA: ABNORMAL /HPF (ref 0–2)
SODIUM BLD-SCNC: 138 MMOL/L (ref 132–146)
SPECIFIC GRAVITY UA: 1.01 (ref 1–1.03)
TROPONIN: <0.01 NG/ML (ref 0–0.03)
UROBILINOGEN, URINE: 0.2 E.U./DL
WBC # BLD: 5.9 E9/L (ref 4.5–11.5)
WBC UA: ABNORMAL /HPF (ref 0–5)

## 2021-04-16 PROCEDURE — 6370000000 HC RX 637 (ALT 250 FOR IP): Performed by: EMERGENCY MEDICINE

## 2021-04-16 PROCEDURE — 2580000003 HC RX 258: Performed by: EMERGENCY MEDICINE

## 2021-04-16 PROCEDURE — 85027 COMPLETE CBC AUTOMATED: CPT

## 2021-04-16 PROCEDURE — 84484 ASSAY OF TROPONIN QUANT: CPT

## 2021-04-16 PROCEDURE — 2580000003 HC RX 258: Performed by: RADIOLOGY

## 2021-04-16 PROCEDURE — 6360000004 HC RX CONTRAST MEDICATION: Performed by: RADIOLOGY

## 2021-04-16 PROCEDURE — 80048 BASIC METABOLIC PNL TOTAL CA: CPT

## 2021-04-16 PROCEDURE — 71275 CT ANGIOGRAPHY CHEST: CPT

## 2021-04-16 PROCEDURE — 70450 CT HEAD/BRAIN W/O DYE: CPT

## 2021-04-16 PROCEDURE — 81001 URINALYSIS AUTO W/SCOPE: CPT

## 2021-04-16 PROCEDURE — 93010 ELECTROCARDIOGRAM REPORT: CPT | Performed by: INTERNAL MEDICINE

## 2021-04-16 PROCEDURE — 99285 EMERGENCY DEPT VISIT HI MDM: CPT

## 2021-04-16 PROCEDURE — 71045 X-RAY EXAM CHEST 1 VIEW: CPT

## 2021-04-16 PROCEDURE — 93005 ELECTROCARDIOGRAM TRACING: CPT | Performed by: PHYSICIAN ASSISTANT

## 2021-04-16 RX ORDER — 0.9 % SODIUM CHLORIDE 0.9 %
1000 INTRAVENOUS SOLUTION INTRAVENOUS ONCE
Status: COMPLETED | OUTPATIENT
Start: 2021-04-16 | End: 2021-04-16

## 2021-04-16 RX ORDER — SODIUM CHLORIDE 9 MG/ML
25 INJECTION, SOLUTION INTRAVENOUS PRN
Status: DISCONTINUED | OUTPATIENT
Start: 2021-04-16 | End: 2021-04-16 | Stop reason: HOSPADM

## 2021-04-16 RX ORDER — ACETAMINOPHEN 500 MG
1000 TABLET ORAL ONCE
Status: DISCONTINUED | OUTPATIENT
Start: 2021-04-16 | End: 2021-04-16

## 2021-04-16 RX ORDER — SODIUM CHLORIDE 0.9 % (FLUSH) 0.9 %
10 SYRINGE (ML) INJECTION PRN
Status: DISCONTINUED | OUTPATIENT
Start: 2021-04-16 | End: 2021-04-16 | Stop reason: HOSPADM

## 2021-04-16 RX ORDER — ACETAMINOPHEN 500 MG
1000 TABLET ORAL ONCE
Status: COMPLETED | OUTPATIENT
Start: 2021-04-16 | End: 2021-04-16

## 2021-04-16 RX ADMIN — IOPAMIDOL 75 ML: 755 INJECTION, SOLUTION INTRAVENOUS at 13:02

## 2021-04-16 RX ADMIN — SODIUM CHLORIDE 1000 ML: 9 INJECTION, SOLUTION INTRAVENOUS at 12:10

## 2021-04-16 RX ADMIN — ACETAMINOPHEN 1000 MG: 500 TABLET, FILM COATED ORAL at 12:10

## 2021-04-16 RX ADMIN — Medication 10 ML: at 13:03

## 2021-04-16 ASSESSMENT — PAIN SCALES - GENERAL: PAINLEVEL_OUTOF10: 0

## 2021-04-16 NOTE — ED PROVIDER NOTES
HPI:  4/16/21, Time: 11:33 AM EDT         Ernesto Daugherty is a 48 y.o. female presenting to the ED for near syncope, beginning a few weeks ago. The complaint has been persistent, mild in severity, and worsened by nothing. Patient had covid at the end of February. She's had a headache, lightheadedness myalgias, and occasional palpitations since then. She has followed up with her PCP for these and has a MRI ordered outpatient. Patient says that today. She went from sitting to standing and felt lightheaded. She fell and hit her head but did not have LOC. Denies vision changes, speech changes, numbness, tingling, or focal weakness. She feels generally weak and fatigued. Denies fever, chills, chest pain, shortness of breath, abdominal pain. Has a continued cough. ROS:   Pertinent positives and negatives are stated within HPI, all other systems reviewed and are negative.  --------------------------------------------- PAST HISTORY ---------------------------------------------  Past Medical History:  has a past medical history of Hypertension. Past Surgical History:  has no past surgical history on file. Social History:  reports that she has quit smoking. Her smoking use included cigarettes. She has a 2.50 pack-year smoking history. She has never used smokeless tobacco. She reports current alcohol use. She reports that she does not use drugs. Family History: family history includes Stroke in her father. The patients home medications have been reviewed.     Allergies: Penicillins    ---------------------------------------------------PHYSICAL EXAM--------------------------------------    Constitutional/General: Alert and oriented x3, well appearing, non toxic in NAD  Head: Normocephalic and atraumatic  Eyes: PERRL, EOMI  Mouth: Oropharynx clear, handling secretions, no trismus  Neck: Supple, full ROM, non tender to palpation in the midline, no stridor, no crepitus, no meningeal signs  Pulmonary: Lungs clear to auscultation bilaterally, no wheezes, rales, or rhonchi. Not in respiratory distress  Cardiovascular:  Regular rate. Regular rhythm. No murmurs, gallops, or rubs. 2+ distal pulses  Chest: no chest wall tenderness  Abdomen: Soft. Non tender. Non distended. +BS. No rebound, guarding, or rigidity. No pulsatile masses appreciated. Musculoskeletal: Moves all extremities x 4. Warm and well perfused, no clubbing, cyanosis, or edema. Capillary refill <3 seconds  Skin: warm and dry. No rashes. Neurologic: GCS 15, CN 2-12 grossly intact, no focal deficits, symmetric strength 5/5 in the upper and lower extremities bilaterally  Psych: Normal Affect    -------------------------------------------------- RESULTS -------------------------------------------------  I have personally reviewed all laboratory and imaging results for this patient. Results are listed below.      LABS:  Results for orders placed or performed during the hospital encounter of 60/63/46   Basic metabolic panel   Result Value Ref Range    Sodium 138 132 - 146 mmol/L    Potassium 3.8 3.5 - 5.0 mmol/L    Chloride 99 98 - 107 mmol/L    CO2 25 22 - 29 mmol/L    Anion Gap 14 7 - 16 mmol/L    Glucose 101 (H) 74 - 99 mg/dL    BUN 5 (L) 6 - 20 mg/dL    CREATININE 0.7 0.5 - 1.0 mg/dL    GFR Non-African American >60 >=60 mL/min/1.73    GFR African American >60     Calcium 8.7 8.6 - 10.2 mg/dL   CBC   Result Value Ref Range    WBC 5.9 4.5 - 11.5 E9/L    RBC 3.38 (L) 3.50 - 5.50 E12/L    Hemoglobin 8.3 (L) 11.5 - 15.5 g/dL    Hematocrit 26.8 (L) 34.0 - 48.0 %    MCV 79.3 (L) 80.0 - 99.9 fL    MCH 24.6 (L) 26.0 - 35.0 pg    MCHC 31.0 (L) 32.0 - 34.5 %    RDW 19.9 (H) 11.5 - 15.0 fL    Platelets 145 (H) 195 - 450 E9/L    MPV 9.8 7.0 - 12.0 fL   Troponin I   Result Value Ref Range    Troponin <0.01 0.00 - 0.03 ng/mL   Urinalysis   Result Value Ref Range    Color, UA Yellow Straw/Yellow    Clarity, UA Clear Clear    Glucose, Ur Negative Negative mg/dL Bilirubin Urine Negative Negative    Ketones, Urine Negative Negative mg/dL    Specific Gravity, UA 1.015 1.005 - 1.030    Blood, Urine MODERATE (A) Negative    pH, UA 7.0 5.0 - 9.0    Protein, UA Negative Negative mg/dL    Urobilinogen, Urine 0.2 <2.0 E.U./dL    Nitrite, Urine Negative Negative    Leukocyte Esterase, Urine Negative Negative   Microscopic Urinalysis   Result Value Ref Range    WBC, UA NONE 0 - 5 /HPF    RBC, UA 5-10 (A) 0 - 2 /HPF    Bacteria, UA NONE SEEN None Seen /HPF   POC Pregnancy Urine Qual   Result Value Ref Range    HCG, Urine, POC Negative Negative    Lot Number [id number]     Positive QC Pass/Fail Pass     Negative QC Pass/Fail Pass    EKG 12 Lead   Result Value Ref Range    Ventricular Rate 84 BPM    Atrial Rate 84 BPM    P-R Interval 170 ms    QRS Duration 64 ms    Q-T Interval 402 ms    QTc Calculation (Bazett) 475 ms    P Axis 79 degrees    R Axis 50 degrees    T Axis 41 degrees       RADIOLOGY:  Interpreted by Radiologist.  CTA CHEST W CONTRAST   Final Result   No evidence of pulmonary embolism or acute pulmonary abnormality. XR CHEST PORTABLE   Final Result   Insert cardio         CT HEAD WO CONTRAST   Final Result   No acute intracranial process is identified. EKG Interpretation  Interpreted by emergency department physician    Rhythm: normal sinus   Rate: normal  Axis: normal  Conduction: normal  ST Segments: nonspecific changes  T Waves: no acute change    Clinical Impression: no acute changes  Comparison to prior EKG: stable as compared to patient's most recent EKG      ------------------------- NURSING NOTES AND VITALS REVIEWED ---------------------------   The nursing notes within the ED encounter and vital signs as below have been reviewed by myself.   BP (!) 166/94   Pulse 82   Temp 99.5 °F (37.5 °C) (Oral)   Resp 18   Ht 5' 6.5\" (1.689 m)   Wt 186 lb (84.4 kg)   LMP 04/07/2021   SpO2 100%   BMI 29.57 kg/m²   Oxygen Saturation Interpretation: Normal    The patients available past medical records and past encounters were reviewed. ------------------------------ ED COURSE/MEDICAL DECISION MAKING----------------------  Medications   sodium chloride flush 0.9 % injection 10 mL (has no administration in time range)   sodium chloride flush 0.9 % injection 10 mL (10 mLs Intravenous Given 4/16/21 1303)   0.9 % sodium chloride infusion (has no administration in time range)   acetaminophen (TYLENOL) tablet 1,000 mg (1,000 mg Oral Given 4/16/21 1210)   0.9 % sodium chloride bolus (0 mLs Intravenous Stopped 4/16/21 1328)   iopamidol (ISOVUE-370) 76 % injection 75 mL (75 mLs Intravenous Given 4/16/21 1302)             Medical Decision Making:    Physical exam unremarkable. Vitals stable. Labs and imaging obtained. Hemoglobin decreasing. Patient declines rectal exam. Says that she's had vaginal bleeding for months. Feeling better on re-evaluation. Declines admission. Would like discharge and will follow up outpatient with PCP. Re-Evaluations:             Re-evaluation. Patients symptoms are improving        This patient's ED course included: a personal history and physicial examination, re-evaluation prior to disposition, multiple bedside re-evaluations, IV medications, cardiac monitoring, continuous pulse oximetry and a personal history and physicial eaxmination    This patient has remained hemodynamically stable during their ED course. Counseling: The emergency provider has spoken with the patient and discussed todays results, in addition to providing specific details for the plan of care and counseling regarding the diagnosis and prognosis. Questions are answered at this time and they are agreeable with the plan.       --------------------------------- IMPRESSION AND DISPOSITION ---------------------------------    IMPRESSION  1. Near syncope    2. Palpitations    3. Myalgia    4.  Chronic intractable headache, unspecified headache type DISPOSITION  Disposition: Discharge to home  Patient condition is stable        NOTE: This report was transcribed using voice recognition software.  Every effort was made to ensure accuracy; however, inadvertent computerized transcription errors may be present          Raj Daugherty MD  04/16/21 4727

## 2021-04-16 NOTE — LETTER
[facility]  [address]  [place] [address]  Phone: [phone]               April 16, 2021    Patient:    YOB: 1970   Date of Visit: 4/16/2021       To Whom It May Concern:     Josie Zamarripa was seen and treated in our emergency department on 4/16/2021. She May return to work on April 19.        Sincerely,       Davonte Brewster RN         Signature:__________________________________

## 2021-04-19 ENCOUNTER — CARE COORDINATION (OUTPATIENT)
Dept: OTHER | Facility: CLINIC | Age: 51
End: 2021-04-19

## 2021-04-19 NOTE — CARE COORDINATION
Ambulatory Care Coordination Note  4/19/2021  CM Risk Score: 0  Charlson 10 Year Mortality Risk Score: 4%     ACC: Melissa Amor, RN    Summary Note: ACM attempted to reach patient for introduction to [facility] related to ED visit, hx COVID 19, RS 43%. HIPAA compliant message left requesting a return phone call. Will attempt to outreach patient again. No future appointments.

## 2021-04-21 ENCOUNTER — CARE COORDINATION (OUTPATIENT)
Dept: OTHER | Facility: CLINIC | Age: 51
End: 2021-04-21

## 2021-04-21 NOTE — CARE COORDINATION
Ambulatory Care Coordination Note  2021  CM Risk Score: 0  Charlson 10 Year Mortality Risk Score: 4%     ACC: Kim Miranda RN    Summary Note: [facility]) contacted the patient by telephone to introduced the [facility] Care [facility] Program r/t COVID Long hallers, RS 43% and recent ED visit . Verified name and  with patient as identifiers. Patient was agreeable to enroll, however, she requested a call back as she needed a breathing treatment. We agree on a return call this afternoon.

## 2021-04-21 NOTE — CARE COORDINATION
\"ready\" she has struggled to get the STD reestablished. She notes in the past month she has only received $200.00 for the 5 days she worked. Her rent is $700.00 a month for her apartment and she notes \"I am trying to make sure I am not kicked out\" JASON is unsure if or how behind she is on her rent. She states she can not afford to make anymore appointments at this time. At this time she reports having all of her medications. She states she hasn't been eating much but \"has some food\". She reports she has been in contact with her absence manager and is awaiting the paperwork in the mail as she does not have a computer. 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 [facility] offered to place a social work referral, discussed [employer] and the [address] she then became very thankful and apologetic stating \"I have been through all of this alone, thank you so much for taking the time to talk to me and help me\". Provided Education:  Discussed red flags and appropriate site of care based on symptoms and resources available to patient including: PCP, Benefits related nurse triage line and Condition related references. Importance and benefits of: Follow up with PCP and specialist, medication adherence, self monitoring and reporting of symptoms. Plan:  Continue biweekly outreaches to provide telephonic support, education and resources as needed. Patient agreeable for Referral to , for assistance locating regional assistance programs (if available) to assist patients financially with [facility], [employer] application, [employer] Document, and other needs as identified in  assessment.      Discuss / follow up on: Patient was agreeable to consider the education provided by the [facility] today and to revisit the discussion of making follow up appointments later this week. Pt verbalized understanding and is agreeable to follow up contact    Ambulatory Care Coordination Assessment    Care Coordination Protocol  Program Enrollment: Complex Care  Week 1 - Initial Assessment     Do you have all of your prescriptions and are they filled?: Yes  Barriers to medication adherence: None  How often do you have trouble taking your medications the way you have been told to take them?: I always take them as prescribed. Do you have Home O2 Therapy?: No      Ability to seek help/take action for Emergent Urgent situations i.e. fire, crime, inclement weather or health crisis. : Independent  Ability to ambulate to restroom: Independent  Ability handle personal hygeine needs (bathing/dressing/grooming): Independent  Ability to manage Medications: Independent  Ability to prepare Food Preparation: Independent  Ability to maintain home (clean home, laundry): Independent  Ability to drive and/or has transportation: Independent  Ability to do shopping: Independent  Ability to manage finances: Independent  Is patient able to live independently?: Yes     Current Housing: Apartment                 Suggested Interventions and [employer]   Social Work: Completed              Goals      Conditions and Symptoms      I will schedule office visits, as directed by my provider. I will notify my provider of any symptoms that indicate a worsening of my condition. Barriers: fear of failure, overwhelmed by complexity of regimen, and lack of education, financial   Plan for overcoming my barriers: Work with ACM to better understand my Red Flags and Referrals. Work with ACM SW to identify financial assistance as available. Confidence: 7/10  Anticipated Goal Completion Date: 6/1/21                    Prior to Admission medications    Medication Sig Start Date End Date Taking?  Authorizing Provider   sertraline (ZOLOFT) 50 MG tablet Take 1 tablet by mouth daily 4/9/21  Yes Saloni Ceballos MD   albuterol (PROVENTIL) (5 MG/ML) 0.5% nebulizer solution Take 1 mL by nebulization 4 times daily as needed for Wheezing 4/2/21  Yes Saloni Ceballos MD   ferrous sulfate (IRON 325) 325 (65 Fe) MG tablet Take 1 tablet by mouth daily (with breakfast) 3/22/21  Yes Saloni Ceballos MD   amLODIPine (NORVASC) 10 MG tablet Take 1 tablet by mouth daily 3/10/21  Yes Katie Roe DO   metoprolol tartrate (LOPRESSOR) 50 MG tablet Take 1 tablet by mouth 2 times daily 2/18/21  Yes Saloni Ceballos MD   albuterol sulfate HFA (VENTOLIN HFA) 108 (90 Base) MCG/ACT inhaler Inhale 2 puffs into the lungs every 4-6 hours as needed for Wheezing or Shortness of Breath 2/5/21  Yes Gely Orta PA-C   potassium chloride (KLOR-CON M) 20 MEQ extended release tablet Take 1 tablet by mouth 2 times daily for 15 days 3/9/21 4/9/21  Warren Taylor MD       No future appointments.

## 2021-04-22 NOTE — TELEPHONE ENCOUNTER
Thank you for the referral. I'll start looking for resources and give her a call. Good idea to send everything together - I'll be mindful of information overload as well. Thanks!   Blake

## 2021-04-23 ENCOUNTER — CARE COORDINATION (OUTPATIENT)
Dept: OTHER | Facility: CLINIC | Age: 51
End: 2021-04-23

## 2021-04-23 ENCOUNTER — TELEPHONE (OUTPATIENT)
Dept: FAMILY MEDICINE CLINIC | Age: 51
End: 2021-04-23

## 2021-04-23 NOTE — TELEPHONE ENCOUNTER
nya called in and is needing for you to give her an estimated date of return to work or to release her back, she states that without the estimate they will not pay her.

## 2021-04-23 NOTE — LETTER
[facility]  [address] [address]  Phone: [phone]  Fax: [phone]    Alisson Real MD        April 23, 2021     Patient: Joie Centeno   YOB: 1970   Date of Visit: 4/23/2021       To Whom it May Concern:    Joie Centeno may return to work on 5/1/2021. If not feeling improved, will need to be re-evaluated. If you have any questions or concerns, please don't hesitate to call.     Sincerely,         Alisson Real MD

## 2021-04-23 NOTE — CARE COORDINATION
TC to pt for referral follow up. States she has been off work for an extended period due to [place] infection. Writer provided supportive listening and encouragement as pt expressed frustration with being off, financial hardship and feeling that she doesn't have support. Pt and writer discussed financial hardship due to medical bills. States she needs assistance with household bills and rent as well. Pt open to receiving resources through the mail, as well as calling [employer]. Based on discussion, plan was developed:    Medical Bills:  Pt states she would like to be able to pay these bills outright, versus applying for financial assistance. Informed pt that if she decides that she would like to apply for assistance through [facility] billing department, the information is provided on the back of the statement. [address]:  Pt provided with [employer] phone number and how to apply for the fund. Advised pt to provide as much detail as possible so that the committee can make an informed decision. Pt VU. General Assistance:  Pt and writer discussed applying for [employer] and [employer], which she is agreeable to. Would like a paper application mailed. Also discussed any other resources available and writer will find as much information as possible and send to pt by mail. Will also include [employer] information discussed by pt and JASON Trevino. Pt VU. Pt seemed in better spirits after conversation and expressed gratitude for [employer] and SW's assistance. Will follow up to ensure receipt of information sent.      Nicole Barboza, MANA, [facility]    Associate Care Management   [phone]

## 2021-04-26 ENCOUNTER — CARE COORDINATION (OUTPATIENT)
Dept: OTHER | Facility: CLINIC | Age: 51
End: 2021-04-26

## 2021-04-26 NOTE — CARE COORDINATION
Writer sent the following information to pt through regular mail:    - [employer] ADVANTAGE brochure    - Medicaid/SNAP application with listing of [address] office, as well as the number to apply by phone ([phone])    - Contact information for [employer]    - Swype listing printout for [facility]    - Information from [employer] ([address] and pantries in the [facility] area)    - Information on area agencies for various resources found through [facility].     MANA Calvin, [facility]    Associate Care Management   [phone]

## 2021-04-27 ENCOUNTER — CARE COORDINATION (OUTPATIENT)
Dept: OTHER | Facility: CLINIC | Age: 51
End: 2021-04-27

## 2021-05-04 ENCOUNTER — OFFICE VISIT (OUTPATIENT)
Dept: FAMILY MEDICINE CLINIC | Age: 51
End: 2021-05-04
Payer: COMMERCIAL

## 2021-05-04 VITALS
TEMPERATURE: 97.3 F | BODY MASS INDEX: 29.35 KG/M2 | SYSTOLIC BLOOD PRESSURE: 131 MMHG | HEIGHT: 67 IN | OXYGEN SATURATION: 98 % | RESPIRATION RATE: 16 BRPM | HEART RATE: 81 BPM | DIASTOLIC BLOOD PRESSURE: 85 MMHG | WEIGHT: 187 LBS

## 2021-05-04 DIAGNOSIS — I10 ESSENTIAL HYPERTENSION: ICD-10-CM

## 2021-05-04 DIAGNOSIS — R51.9 DAILY HEADACHE: ICD-10-CM

## 2021-05-04 DIAGNOSIS — F33.9 RECURRENT MAJOR DEPRESSIVE DISORDER, REMISSION STATUS UNSPECIFIED (HCC): ICD-10-CM

## 2021-05-04 DIAGNOSIS — Z86.16 HISTORY OF COVID-19: Primary | ICD-10-CM

## 2021-05-04 PROCEDURE — 99213 OFFICE O/P EST LOW 20 MIN: CPT | Performed by: FAMILY MEDICINE

## 2021-05-04 PROCEDURE — 99212 OFFICE O/P EST SF 10 MIN: CPT | Performed by: FAMILY MEDICINE

## 2021-05-04 RX ORDER — POTASSIUM CHLORIDE 20 MEQ/1
20 TABLET, EXTENDED RELEASE ORAL 2 TIMES DAILY
Qty: 30 TABLET | Refills: 0 | Status: SHIPPED
Start: 2021-05-04 | End: 2021-09-17 | Stop reason: SDUPTHER

## 2021-05-04 RX ORDER — CHLORTHALIDONE 25 MG/1
25 TABLET ORAL DAILY
Qty: 30 TABLET | Refills: 3 | Status: SHIPPED
Start: 2021-05-04 | End: 2021-09-17 | Stop reason: SDUPTHER

## 2021-05-04 NOTE — PROGRESS NOTES
[address] [place] [address]  FAMILY MEDICINE RESIDENCY PROGRAM  DATE OF VISIT : 2021    Patient : Nick Rangel   Age : 46 y.o.  : 1970   MRN : [id number]   ______________________________________________________________________    Chief Complaint :   Chief Complaint   Patient presents with    Other     not felling better from covid       HPI : Nick Rangel is 46 y.o. female who presented to the clinic today for above cc. Still having dry cough, shortness of breath improving. Sx usually worse at night. Still requiring albuterol. Not back to work yet. No history of asthma or COPD, former smoker, smoked about 5 years. Referred to pulm earlier this week.      Still having daily headaches post COVID, waking her up with it occasionally, worse with laying down. Towards top of head. No nausea. No photophobia, no migraine history. Has not had MRI done. Depression: PHQ 10, endorses feeling depressed. Rx'ed zoloft at last visit but has not started. Not sleeping well but always tired. No SI/HI. Has been on meds in the past. Was on prozac in the past, though did not take long enough. Does not see a counselor. Willing to try meds and counseling again. Past Medical History :  Past Medical History:   Diagnosis Date    Hypertension      No past surgical history on file. Review of Systems :    ROS - Per HPI   ______________________________________________________________________    Physical Exam :    Vitals: /85 (Site: Right Upper Arm, Position: Sitting, Cuff Size: Medium Adult)   Pulse 81   Temp 97.3 °F (36.3 °C) (Cerebral)   Resp 16   Ht 5' 6.6\" (1.692 m)   Wt 187 lb (84.8 kg)   LMP 2021   SpO2 98%   BMI 29.64 kg/m²   GENERAL: Alert, cooperative, no acute distress. CHEST: No tenderness or deformity, full & symmetric excursion  LUNG: Clear to auscultation bilaterally,  respirations unlabored.  No rales/wheezing/rubs  HEART: RRR, S1 and S2 normal, no murmur, rub or gallop. DP pulses 2/4  ABDOMEN: SNTND, no masses, no organomegaly, no guarding, rebound or rigidity. EXTREMITIES:  Extremities normal, atraumatic, no cyanosis or edema. Distal pulses equal bilaterally    ______________________________________________________________________    Assessment & Plan :     Diagnosis Orders   1. History of COVID-19  Full PFT Study With Bronchodilator   2. Essential hypertension  chlorthalidone (HYGROTON) 25 MG tablet   3. Daily headache     4. Recurrent major depressive disorder, remission status unspecified (HCC)       Ongoing dyspnea and fatigue, likely COVID \"long hauler\". Continue albuterol prn. Referred to pulm already. PFTs ordered. Almost daily headache, to schedule MRI/MRV still. Start zoloft for depression, given area counseling agencies to call and establish with.      RTC 4 for f/u depression, post-COVID       Khang Zaldivar MD PGY-2    Discussed with: Dr. Liang Silverio

## 2021-05-05 ENCOUNTER — CARE COORDINATION (OUTPATIENT)
Dept: OTHER | Facility: CLINIC | Age: 51
End: 2021-05-05

## 2021-05-07 ENCOUNTER — CARE COORDINATION (OUTPATIENT)
Dept: OTHER | Facility: CLINIC | Age: 51
End: 2021-05-07

## 2021-05-10 ENCOUNTER — CARE COORDINATION (OUTPATIENT)
Dept: OTHER | Facility: CLINIC | Age: 51
End: 2021-05-10

## 2021-05-10 NOTE — CARE COORDINATION
Writer received email this AM from [employer] in [place] (pt also stated in during call last week that this was her absence manager) stating she had reached out to pt and assisted her in completing the [email or web address] fund application.      MANA Milian, [facility]    Associate Care Management   [phone]

## 2021-05-11 ENCOUNTER — CARE COORDINATION (OUTPATIENT)
Dept: OTHER | Facility: CLINIC | Age: 51
End: 2021-05-11

## 2021-05-11 NOTE — CARE COORDINATION
Ambulatory Care Coordination Note  5/11/2021  CM Risk Score: 0  Charlson 10 Year Mortality Risk Score: 4%     ACC: Jessica Shah, RN    Summary Note: ACM attempted to reach patient for follow up call regarding pulmonary appt scheduling and Post-COVID Clinic options. HIPAA compliant message left requesting a return phone call at patients convenience. Will continue to follow.            Care Coordination Interventions    Program Enrollment: Complex Care  Referral from Primary Care Provider: No  Suggested Interventions and Community Resources  Social Work: Completed (Comment: Referral Sent to [employer]  4/21/21)             Future Appointments   Date Time Provider Elly Blevins   6/4/2021  2:20 PM MD Ela Low [facility]

## 2021-05-21 ENCOUNTER — CARE COORDINATION (OUTPATIENT)
Dept: OTHER | Facility: CLINIC | Age: 51
End: 2021-05-21

## 2021-05-21 NOTE — CARE COORDINATION
Ambulatory Care Coordination Note  5/21/2021  CM Risk Score: 0  Charlson 10 Year Mortality Risk Score: 4%     ACC: Glenny Alaniz, RN    Summary Note: ACM attempted to reach patient for follow up call regarding need to schedule pulmonary appt and provide information on [address] HIPAA compliant message left requesting a return phone call at patients convenience. Will continue to follow.            Future Appointments   Date Time Provider Elly Blevins   6/4/2021  2:20 PM Obi Carlson MD [place] [facility]

## 2021-05-25 ENCOUNTER — CARE COORDINATION (OUTPATIENT)
Dept: OTHER | Facility: CLINIC | Age: 51
End: 2021-05-25

## 2021-05-25 NOTE — CARE COORDINATION
TC to pt for SW follow up/check in. Left msg, will continue to attempt outreach.     MANA Carr, [place], [facility]    Associate Care Management   [phone]

## 2021-05-25 NOTE — CARE COORDINATION
ACC: Lev Tomas LPN CM Risk Score: 0  Charlson 10 Year Mortality Risk Score: 4%     ACM attempted to reach patient for follow up call regarding pulmonology referral, relay post-covid clinic information. HIPAA compliant message left requesting a return phone call at patients convenience. Will continue to follow. Bailey Draper, [address] Coordinator  Associate Care Management  [address] [place] [address] Street  Phone: [phone]  [email or web address]

## 2021-05-28 ENCOUNTER — CARE COORDINATION (OUTPATIENT)
Dept: OTHER | Facility: CLINIC | Age: 51
End: 2021-05-28

## 2021-05-28 NOTE — CARE COORDINATION
ACC: Denis Padilla LPN CM Risk Score: 0  Charlson 10 Year Mortality Risk Score: 4%     ACM second attempt to reach patient for follow up call regarding pulmonology referral, relay post-covid clinic information. VM full. Will continue to follow. Bailey Olea, [address] Coordinator  Associate Care Management  [address] [place] [address] Street  Phone: [phone]  [email or web address]

## 2021-05-28 NOTE — CARE COORDINATION
TC to pt for follow up/check in. VM box full and unable to leave message. Will make another attempt before EOD.      Nicole Barboza, MSJUANIS, [facility]    Associate Care Management   [phone]

## 2021-06-01 NOTE — CARE COORDINATION
TC to pt for follow up/check in.  VM box full and unable to leave message.         MANA Rojo, [place], [place] Worker  Associate Care Management   [phone]

## 2021-06-04 ENCOUNTER — CARE COORDINATION (OUTPATIENT)
Dept: OTHER | Facility: CLINIC | Age: 51
End: 2021-06-04

## 2021-06-04 NOTE — CARE COORDINATION
Ambulatory Care Coordination Note  6/4/2021  CM Risk Score: 0  Charlson 10 Year Mortality Risk Score: 4%     ACC: Sarmad Hyman, RN    Summary Note: ACM attempted to reach patient for follow up call regarding CM follow up and appointment scheduling. HIPAA compliant message left requesting a return phone call at patients convenience. Will continue to follow. Lost to follow up letter mailed, will attempt one final outreach.         Future Appointments   Date Time Provider Elly Blevins   6/15/2021 10:00 AM [facility]

## 2021-06-04 NOTE — LETTER
June 4, 2021        Dear Marvin Camejo,     I have been trying to reach you for a follow up call for services with our Associate Care Management Program. Your wellbeing is very important to us. With continued partnership in the [facility], we hope to work with you to optimize your health and increase your quality of life. I look forward to continuing to work with you. Please contact me at your convenience and we can schedule a time that works best for you. [address] with [address]-    Sincerely,    Avery Singh RN, BSN   Associate Care Manager   [phone]  [email or web address]

## 2021-06-15 ENCOUNTER — OFFICE VISIT (OUTPATIENT)
Dept: FAMILY MEDICINE CLINIC | Age: 51
End: 2021-06-15
Payer: COMMERCIAL

## 2021-06-15 ENCOUNTER — CARE COORDINATION (OUTPATIENT)
Dept: OTHER | Facility: CLINIC | Age: 51
End: 2021-06-15

## 2021-06-15 VITALS
HEIGHT: 67 IN | DIASTOLIC BLOOD PRESSURE: 86 MMHG | OXYGEN SATURATION: 99 % | HEART RATE: 81 BPM | WEIGHT: 189 LBS | RESPIRATION RATE: 16 BRPM | TEMPERATURE: 97.5 F | SYSTOLIC BLOOD PRESSURE: 168 MMHG | BODY MASS INDEX: 29.66 KG/M2

## 2021-06-15 DIAGNOSIS — I10 ESSENTIAL HYPERTENSION: ICD-10-CM

## 2021-06-15 DIAGNOSIS — U09.9 CHRONIC POST-COVID-19 SYNDROME: Primary | ICD-10-CM

## 2021-06-15 DIAGNOSIS — R53.81 PHYSICAL DECONDITIONING: ICD-10-CM

## 2021-06-15 PROCEDURE — 99213 OFFICE O/P EST LOW 20 MIN: CPT | Performed by: FAMILY MEDICINE

## 2021-06-15 PROCEDURE — 99212 OFFICE O/P EST SF 10 MIN: CPT | Performed by: FAMILY MEDICINE

## 2021-06-15 RX ORDER — HYDROCHLOROTHIAZIDE 25 MG/1
25 TABLET ORAL EVERY MORNING
Qty: 30 TABLET | Refills: 2 | Status: SHIPPED
Start: 2021-06-15 | End: 2021-07-16 | Stop reason: ALTCHOICE

## 2021-06-15 ASSESSMENT — COLUMBIA-SUICIDE SEVERITY RATING SCALE - C-SSRS
7. DID THIS OCCUR IN THE LAST THREE MONTHS: NO
6. HAVE YOU EVER DONE ANYTHING, STARTED TO DO ANYTHING, OR PREPARED TO DO ANYTHING TO END YOUR LIFE?: YES
2. HAVE YOU ACTUALLY HAD ANY THOUGHTS OF KILLING YOURSELF?: NO
1. WITHIN THE PAST MONTH, HAVE YOU WISHED YOU WERE DEAD OR WISHED YOU COULD GO TO SLEEP AND NOT WAKE UP?: NO

## 2021-06-15 ASSESSMENT — PATIENT HEALTH QUESTIONNAIRE - PHQ9
SUM OF ALL RESPONSES TO PHQ QUESTIONS 1-9: 15
SUM OF ALL RESPONSES TO PHQ QUESTIONS 1-9: 15
8. MOVING OR SPEAKING SO SLOWLY THAT OTHER PEOPLE COULD HAVE NOTICED. OR THE OPPOSITE, BEING SO FIGETY OR RESTLESS THAT YOU HAVE BEEN MOVING AROUND A LOT MORE THAN USUAL: 0
5. POOR APPETITE OR OVEREATING: 0
1. LITTLE INTEREST OR PLEASURE IN DOING THINGS: 3
6. FEELING BAD ABOUT YOURSELF - OR THAT YOU ARE A FAILURE OR HAVE LET YOURSELF OR YOUR FAMILY DOWN: 3
4. FEELING TIRED OR HAVING LITTLE ENERGY: 3
3. TROUBLE FALLING OR STAYING ASLEEP: 3
10. IF YOU CHECKED OFF ANY PROBLEMS, HOW DIFFICULT HAVE THESE PROBLEMS MADE IT FOR YOU TO DO YOUR WORK, TAKE CARE OF THINGS AT HOME, OR GET ALONG WITH OTHER PEOPLE: 2
SUM OF ALL RESPONSES TO PHQ QUESTIONS 1-9: 15
9. THOUGHTS THAT YOU WOULD BE BETTER OFF DEAD, OR OF HURTING YOURSELF: 0
7. TROUBLE CONCENTRATING ON THINGS, SUCH AS READING THE NEWSPAPER OR WATCHING TELEVISION: 3

## 2021-06-15 NOTE — PROGRESS NOTES
Attending Physician Statement    S:   Chief Complaint   Patient presents with    Post-COVID Symptoms     f/u    Fatigue      COVID long-haul syndrome. HTN. Chronic fatigue - unable to RTW. Unable to afford meds while she waits for disability. Met with psychology for sleep issues  O: Blood pressure (!) 168/86, pulse 81, temperature 97.5 °F (36.4 °C), temperature source Cerebral, resp. rate 16, height 5' 6.6\" (1.692 m), weight 189 lb (85.7 kg), last menstrual period 06/10/2021, SpO2 99 %, not currently breastfeeding. Exam:   Heart - RRR   Lungs - clear   Tearful, agitated, appears fatigued  A: COVID long-haul syndrome, depression, HTN  P:  Will meet with psychologist for depression/anxiety   Possible referral to [facility] in [facility] 13 HCTZ (not taking chlorthalidone or amlodipine)   Follow-up as ordered    I have discussed the case, including pertinent history and exam findings with the resident. I agree with the documented assessment and plan.

## 2021-06-15 NOTE — Clinical Note
Pt is an orange header, being followed for post COVID syndrome, out of work since Feb and has not been paid by short term disability, waiting on back pay. Unable to afford meds, struggling financially. Your assistance in any way is appreciated, thank you!

## 2021-06-15 NOTE — PROGRESS NOTES
[address] [facility] RESIDENCY PROGRAM  DATE OF VISIT : 6/15/2021    Patient : Rupal [place]   Age : 46 y.o.  : 1970   MRN : [id number]   ______________________________________________________________________    Chief Complaint :   Chief Complaint   Patient presents with    Post-COVID Symptoms     f/u    Fatigue       HPI : Rupal [place] is 46 y.o. female who presented to the clinic today for above cc. Cough and shortness of breath have mostly resolved. 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 Still endorses severe fatigue and difficulty sleeping, affecting all aspects of life. [facility]. Depression: PHQ 15, endorses feeling depressed, down, difficulty with sleeping. States appetite is mostly unchanged. No suicidal homicidal ideations. Met with psychologist Dr. Rajani Forte during visit today, discussed sleep hygiene, to follow-up further with her on Thursday of this week. Has not been able to afford medications since being out of work, not currently on any blood pressure pills besides her metoprolol. Past Medical History :  Past Medical History:   Diagnosis Date    Hypertension      No past surgical history on file. Review of Systems :    ROS - Per HPI   ______________________________________________________________________    Physical Exam :    Vitals: BP (!) 168/86 (Site: Right Upper Arm, Position: Sitting, Cuff Size: Medium Adult)   Pulse 81   Temp 97.5 °F (36.4 °C) (Cerebral)   Resp 16   Ht 5' 6.6\" (1.692 m)   Wt 189 lb (85.7 kg)   LMP 06/10/2021   SpO2 99%   BMI 29.96 kg/m²   GENERAL: Tired, tearful, agitated  CHEST: No tenderness or deformity, full & symmetric excursion  LUNG: Clear to auscultation bilaterally,  respirations unlabored. No rales/wheezing/rubs  HEART: RRR, S1 and S2 normal, no murmur, rub or gallop.  DP pulses 2/4  ABDOMEN: SNTND, no masses, no organomegaly, no guarding, rebound or rigidity. EXTREMITIES:  Extremities normal, atraumatic, no cyanosis or edema. Distal pulses equal bilaterally    ______________________________________________________________________    Assessment & Plan :     Diagnosis Orders   1. Chronic post-COVID-19 syndrome  [facility], Sunitha Ruth   2. Physical deconditioning  [facility], Sunitha Ruth   3. Essential hypertension  hydroCHLOROthiazide (HYDRODIURIL) 25 MG tablet     Ongoing post Covid syndrome, continue deconditioning, will refer to physical therapy for rehabilitation, attempting to get into post Covid syndrome clinic with [facility].   Add HCTZ for blood pressure regimen  Patient to see Dr. Kayden Ferrari further on Thursday for further treatment of anxiety and depression    RTC 4 for f/u post-COVID       Darrick Guerrero MD PGY-2    Discussed with: Dr. Jaent Collado

## 2021-06-15 NOTE — PROGRESS NOTES
Patient seen in cotreatment with PCP, Dr. Raven Shell for evaluation of depression and anxiety. Patient presents today for follow-up of long-term COVID-19 symptoms and depression. PHQ-9 score = 15. Patient denied any current suicidal ideation. She denied any current alcohol or drug use. She presented as tense and agitated, stating she is unable to sleep. Stated that she gets around 2 hours of broken sleep per night. She has difficulty relaxing and racing thoughts, which primarily relate to financial issues. She has been off work since February 2021 and has not gotten any income. She is working with her short-term disability company on getting back payment. 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 She stated that she generally stays home due to anxiety. She has panic attacks going to public places, all due to fear of COVID-19 transmission/contamination. She has avoided seeing her parents due to her Covid status. However, she speaks with her mother regularly and describes her as a good source of support. She has one adult son. She lives alone. She has stopped taking psychotropic medication due to side effects. She is interested in psychotherapy, and a full evaluation was scheduled for June 17, 2021. Discussed sleep hygiene techniques: 1) reserve bed only for sleeping; 2) do not watch television in bed; 3) write down stressors before bed and short-term solutions.

## 2021-06-15 NOTE — CARE COORDINATION
Ambulatory Care Coordination Note  6/15/2021  CM Risk Score: 0  Charlson 10 Year Mortality Risk Score: 4%     ACC: Agustina Gomez, RN     Summary Note: [facility]) contacted the patient by telephone to follow up on progress, discuss new issues or concerns, and reinforce/ provide patient education. Verified name and  with patient as identifiers. She notes that she was UTR due to her purse and phone being lost, she now has a new phone and her purse was returned to her by the police. Sushma saw her PCP today, she still has cough, getting better though. She is still using inhailer during the day and nebulizer at night. Continues to have headaches. Reports continued fatigue and deconditioning. Feels like she needs to go back to work since she has been off for 4 months without pay and is concerned about finances but also does not feel she can physically return. Current RTW date is 7/15. PCP has ordered outpatient PT. She spoke with absence manager Rhoda Henriquez last Thursday who states the issues with her STD have been resolved. Sushma questioned why she was not paid. ACM suggested it may be due to the issues being resolved the day before pay day and that if they have been resolved she may get paid on . She has made an appointment to go to [facility]. ACM advised this clinic not in network with her [address] [facility] [address]. ACM introduced the [address] details to patient, she was happy to know there was an in network option. She requested the details be sent to her new email: [email or web address]. Sushma scored 15 on her PHQ-9 and reports symptoms of depression to PCP. This was discussed briefly today with the understanding we would revisit this topic after her new patient appointment with Dr. Zachary Aleman Psychologist for depression/anxiety.          Reinforced/ Provided Education:  Discussed red flags and appropriate site of care based on symptoms and resources available to patient including: PCP, Specialist, Benefits related nurse triage line, Urgent care clinics, Condition related references and [address]. Importance and benefits of: Follow up with PCP and specialist, medication adherence, self monitoring and reporting of symptoms. Scheduling with Post-COVID Clinic and PT     Plan:  Continue every 2 week outreaches to provide telephonic support, education and resources as needed. Discuss / follow up on: Goal progress and has PT been scheduled, has Post-COVID Clinic appt been scheduled, f/u after Psychologist appt. & Med review. Pt verbalized understanding and is agreeable to follow up contact. Care Coordination Interventions    Program Enrollment: Complex Care  Referral from Primary Care Provider: No  Suggested Interventions and Community Resources  Behavorial Health: Completed (Comment: New Pt appt Dr. Mireya Jones 6/17/21)  Disease Specific Clinic: Completed (Comment: [address] 6/15/21)  Physical Therapy: Completed (Comment: Ordered 6/15/21)  Social Work: Completed (Comment: Referral Sent to [facility] 4/21/21)         Goals Addressed                 This Visit's Progress     Conditions and Symptoms   Improving     I will schedule office visits, as directed by my provider. I will notify my provider of any symptoms that indicate a worsening of my condition. Barriers: fear of failure, overwhelmed by complexity of regimen, and lack of education, financial   Plan for overcoming my barriers: Work with [facility] to better understand my Red Flags and Referrals. Work with [facility] to identify financial assistance as available.    Confidence: 7/10  Anticipated Goal Completion Date: 6/1/21                    Future Appointments   Date Time Provider Elly Blevins   6/17/2021  9:00 AM Sybil Shaw, PhD [facility], [facility]. [facility]   7/9/2021  9:40 AM MD Rosalba Motaswick [facility]

## 2021-06-15 NOTE — TELEPHONE ENCOUNTER
That's great news! No, everything I sent was by mail. If she needs anything else, I'm happy to send it by email now.      Thanks,  Blake

## 2021-07-14 NOTE — PROGRESS NOTES
[facility] RESIDENCY PROGRAM  DATE OF VISIT : 2021    Patient : Mariana Pelayo   Age : 46 y.o.  : 1970   MRN : [id number]   ______________________________________________________________________    Chief Complaint :   Chief Complaint   Patient presents with    Post-COVID Symptoms       HPI : Mariana Pelayo is 46 y.o. female who presented to the clinic today for above cc. Cough worsened over last week, feel like she has to cough something up unable to. Shortness of breath is mostly at baseline. 1-2 uses of albuterol daily. 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 Still endorses severe fatigue and difficulty sleeping, affecting all aspects of life. Unable to get to [facility] due to insurance issues. Due to start PT . Still with headaches 3-4 times per week, MRI pending. Depression: Was unable to follow-up with Dr. Kirt Dobson. Not interested in following up further at this time. Continuing on Zoloft 50. No SI/HI     Hypertension: Mildly elevated today, states that she only took 2 of her 3 pills. No chest pain, shortness of breath, dizziness. Patient reports she has been able to afford recent prescriptions. Past Medical History :  Past Medical History:   Diagnosis Date    Hypertension      No past surgical history on file.       Review of Systems :    ROS - Per HPI   ______________________________________________________________________    Physical Exam :    Vitals: BP (!) 139/91 (Site: Right Upper Arm, Position: Sitting, Cuff Size: Medium Adult)   Pulse 99   Temp 99.8 °F (37.7 °C) (Oral)   Ht 5' 6.5\" (1.689 m)   Wt 193 lb (87.5 kg)   LMP 2021   SpO2 99%   BMI 30.68 kg/m²   GENERAL: Tired appearing  CHEST: No tenderness or deformity, full & symmetric excursion  LUNG: Few scattered inspiratory and expiratory wheezes  HEART: RRR, S1 and S2 normal, no murmur, rub or gallop. DP pulses 2/4  ABDOMEN: SNTND, no masses, no organomegaly, no guarding, rebound or rigidity. EXTREMITIES:  Extremities normal, atraumatic, no cyanosis or edema. Distal pulses equal bilaterally    ______________________________________________________________________    Assessment & Plan :     Diagnosis Orders   1. Chronic [id number] syndrome  Jersey Dunaway MD, Pulmonary, Valentín (OLIVER)    guaiFENesin (MUCINEX) 600 MG extended release tablet    XR CHEST (2 VW)    budesonide-formoterol (SYMBICORT) 160-4.5 MCG/ACT AERO   2. Physical deconditioning     3. Essential hypertension       Chronic post Covid syndrome: Patient symptoms overall not improved, unable to return to work, letter will be provided. Chest x-ray ordered, Mucinex sent to pharmacy. Will trial Symbicort. Pulmonology referral in place. To see physical therapy for evaluation 7/20. Hypertension: Mildly elevated, asymptomatic, encourage patient to take missed dose.   Reminded to take all blood pressure pills.     Return to clinic in 4 weeks for post Covid follow-up       Amy Koenig MD PGY-3    Discussed with: Dr. Paras Ziegler

## 2021-07-16 ENCOUNTER — OFFICE VISIT (OUTPATIENT)
Dept: FAMILY MEDICINE CLINIC | Age: 51
End: 2021-07-16
Payer: COMMERCIAL

## 2021-07-16 VITALS
HEART RATE: 99 BPM | TEMPERATURE: 99.8 F | HEIGHT: 67 IN | SYSTOLIC BLOOD PRESSURE: 139 MMHG | OXYGEN SATURATION: 99 % | WEIGHT: 193 LBS | BODY MASS INDEX: 30.29 KG/M2 | DIASTOLIC BLOOD PRESSURE: 91 MMHG

## 2021-07-16 DIAGNOSIS — R53.81 PHYSICAL DECONDITIONING: ICD-10-CM

## 2021-07-16 DIAGNOSIS — I10 ESSENTIAL HYPERTENSION: ICD-10-CM

## 2021-07-16 DIAGNOSIS — U09.9 CHRONIC POST-COVID-19 SYNDROME: Primary | ICD-10-CM

## 2021-07-16 PROCEDURE — 99213 OFFICE O/P EST LOW 20 MIN: CPT | Performed by: FAMILY MEDICINE

## 2021-07-16 PROCEDURE — 99212 OFFICE O/P EST SF 10 MIN: CPT | Performed by: FAMILY MEDICINE

## 2021-07-16 RX ORDER — BUDESONIDE AND FORMOTEROL FUMARATE DIHYDRATE 160; 4.5 UG/1; UG/1
2 AEROSOL RESPIRATORY (INHALATION) 2 TIMES DAILY
Qty: 3 INHALER | Refills: 1 | Status: SHIPPED
Start: 2021-07-16 | End: 2021-09-17 | Stop reason: SDUPTHER

## 2021-07-16 RX ORDER — GUAIFENESIN 600 MG/1
600 TABLET, EXTENDED RELEASE ORAL 2 TIMES DAILY
Qty: 30 TABLET | Refills: 0 | Status: SHIPPED | OUTPATIENT
Start: 2021-07-16 | End: 2021-07-31

## 2021-07-16 ASSESSMENT — LIFESTYLE VARIABLES: HOW OFTEN DO YOU HAVE A DRINK CONTAINING ALCOHOL: NEVER

## 2021-07-16 NOTE — PROGRESS NOTES
59-year-old female with history of chronic post Covid syndrome, hypertension here for follow-up. Patient remains off of work due to chronic post Covid syndrome, symptoms overall stable in terms of fatigue and headaches. Patient does note worsening cough over the last week, feels like she needs to cough something up but is unable to. Reports adequate hydration. Not taking any Mucinex. Reports some subjective low-grade fevers. No chills. Breathing is at baseline, using albuterol 1-2 times per day. Not on a daily inhaler. Not following with pulmonology. Patient was to follow with the Covid clinic in [place] however states insurance will not accept. Patient unable to return to work at this time. Patient working on getting off [facility]. Hypertension: Elevated today, only took 2 of 3 blood pressure pills. Denies any chest pain or shortness of breath. Blood pressure (!) 139/91, pulse 99, temperature 99.8 °F (37.7 °C), temperature source Oral, height 5' 6.5\" (1.689 m), weight 193 lb (87.5 kg), last menstrual period 07/09/2021, SpO2 99 %, not currently breastfeeding. HEENT WNL     Heart regular    Lungs with faint scattered inspiratory and expiratory wheeze   abd non-tender      No edema    Pulses intact     Assessment and plan  Chronic post Covid syndrome: Patient symptoms overall not improved, unable to return to work, letter will be provided. Chest x-ray ordered, Mucinex sent to pharmacy. Will trial Symbicort. Pulmonology referral in place. To see physical therapy for evaluation 7/20. Hypertension: Mildly elevated, asymptomatic, encourage patient to take missed dose. Reminded to take all blood pressure pills. Return to clinic in 4 weeks for post Covid follow-up    Attending Physician Statement  I have discussed the case, including pertinent history and exam findings with the resident. I agree with the documented assessment and plan.

## 2021-07-20 ENCOUNTER — HOSPITAL ENCOUNTER (OUTPATIENT)
Dept: PHYSICAL THERAPY | Age: 51
Setting detail: THERAPIES SERIES
Discharge: HOME | End: 2021-07-20
Payer: COMMERCIAL

## 2021-07-20 PROCEDURE — 97161 PT EVAL LOW COMPLEX 20 MIN: CPT | Performed by: PHYSICAL THERAPIST

## 2021-07-20 NOTE — PROGRESS NOTES
Physical Therapy  Initial Assessment  Date: 2021  Patient Name: Aneta Villanueva  MRN: [id number]  : 1970        Subjective   General  Chart Reviewed: Yes  Referring Practitioner: Marshal Jonas.  Referral Date : 06/15/21  Diagnosis: chronic post-COVID syndrome  PT Visit Information  Onset Date: 06/15/21  PT Insurance Information: [employer]  Total # of Visits Approved: 6  Total # of Visits to Date: 1  Subjective  Subjective: pt presents to therapy with c/o decreased endurance since having COVID back in February; tells PT she was not hospitalized but was sick for quite some time; no PMH for any other cardio/pulmonary issues; c/o chronic cough which is persistent; not using home O2 but is on nebulizer; sleep is fine; no c/o N/T or buckling B LE's; tells therapist she has  upcoming appt with pulmonologist; return to referring physician in one month  Pain Screening  Patient Currently in Pain: No  Vital Signs  Patient Currently in Pain: No    Objective     Observation/Palpation  Observation: pt able to assume/maintain proper sitting/standing postures    AROM RLE (degrees)  RLE AROM: WNL  AROM LLE (degrees)  LLE AROM : WNL  AROM RUE (degrees)  RUE AROM : WNL  AROM LUE (degrees)  LUE AROM : WNL    Strength Other  Other: grossly 5/5 for all ranges B UE/LE's     Additional Measures  Other: endurance for all prolonged activities is FAIR, pt exhibited cough with all prolonged motions  Sensation  Overall Sensation Status: WNL     Ambulation  Ambulation?: Yes  Ambulation 1  Surface: level tile  Device: No Device  Assistance: Independent  Quality of Gait: normal mechanics noted B LE's/trunk; pt exhibited slight SOB after walking  Balance  Comments: static/dynamic balance is GOOD+/NORMAL     Assessment   Conditions Requiring Skilled Therapeutic Intervention  Assessment: Mrs. Maria Antonia Bedoya exhibited no physical/neurological deficits over the course of the evaluation that would require skilled PT care at this time,  With her current respitory issues, PT feels a course of cardio-pulmonary rehab would be of greater benefit to build up overall endurance and lung function  Prognosis: Good  Decision Making: Low Complexity  Activity Tolerance  Activity Tolerance: Patient Tolerated treatment well  Activity Tolerance: pt tolerated evaluation well overall with some bouts of SOB and coughing during session          Erin Iqbal, PT

## 2021-07-21 ENCOUNTER — CARE COORDINATION (OUTPATIENT)
Dept: OTHER | Facility: CLINIC | Age: 51
End: 2021-07-21

## 2021-07-21 NOTE — CARE COORDINATION
Ambulatory Care Coordination Note  7/21/2021  CM Risk Score: 0  Charlson 10 Year Mortality Risk Score: 4%     ACC: Clista Skiff, RN    Summary Note: [facility] attempted to reach patient for follow up call regarding CM f/u, has patient RTW, ongoing CM need vs graduation assessment. HIPAA compliant message left requesting a return phone call at patients convenience. Will continue to follow.            Care Coordination Interventions    Program Enrollment: Complex Care  Referral from Primary Care Provider: No  Suggested Interventions and Community Resources  Behavorial Health: Completed (Comment: New Pt appt Dr. Janna Sullivan 6/17/21)  Disease Specific Clinic: Completed (Comment: [address] [facility] [address] [facility] [address] 6/15/21)  Physical Therapy: Completed (Comment: Ordered 6/15/21)  Social Work: Completed (Comment: Referral Sent to [facility] 4/21/21)         Future Appointments   Date Time Provider Elly Blevins   7/24/2021  9:00 AM [place] RM 1 [facility] [place] [facility]   7/24/2021 10:00 AM [place] [facility] RM 1 [facility] [place] [facility]   8/16/2021  9:40 AM MD Diane Casarez [facility]

## 2021-07-22 DIAGNOSIS — U09.9 CHRONIC POST-COVID-19 SYNDROME: Primary | ICD-10-CM

## 2021-07-24 ENCOUNTER — HOSPITAL ENCOUNTER (OUTPATIENT)
Dept: MRI IMAGING | Age: 51
Discharge: HOME | End: 2021-07-26
Payer: COMMERCIAL

## 2021-07-24 DIAGNOSIS — R51.9 DAILY HEADACHE: ICD-10-CM

## 2021-07-28 ENCOUNTER — TELEPHONE (OUTPATIENT)
Dept: SLEEP CENTER | Age: 51
End: 2021-07-28

## 2021-08-04 NOTE — PROGRESS NOTES
Date:  8/4/2021          Dear  Dr. Keila Zamarripa: This is to inform you that, as per [facility] Physical Therapy department policy, your patient Haresh Garcia is as of todays date being discharged from Physical Therapy secondary to the following reasons:    PT evaluated pt and found that with her current respitory issues,  a course of cardio-pulmonary rehab would be of greater benefit to build up overall endurance and lung function. If you have any questions, please feel free to call at [phone]      Thank you          Bertrand Tristan, PT    [phone]    The information contained in this Fax the designated recipients intend message only for the personal and confidential use named above. This information is to be handled as privileged and confidential.  If the reader of this message is not the intended recipient, you are hereby notified that you have received this document in error and that any review, dissemination, distribution or copying of this message is strictly prohibited. If you receive this communication in error, please notify us immediately at the above number and return the original to us by mail.   Thank you      [address] [place], [place] [id number] [phone]

## 2021-08-13 ENCOUNTER — HOSPITAL ENCOUNTER (OUTPATIENT)
Dept: GENERAL RADIOLOGY | Age: 51
Discharge: HOME | End: 2021-08-15
Payer: COMMERCIAL

## 2021-08-13 ENCOUNTER — HOSPITAL ENCOUNTER (OUTPATIENT)
Age: 51
Discharge: HOME | End: 2021-08-15
Payer: COMMERCIAL

## 2021-08-13 DIAGNOSIS — U09.9 CHRONIC POST-COVID-19 SYNDROME: ICD-10-CM

## 2021-08-13 PROCEDURE — 71046 X-RAY EXAM CHEST 2 VIEWS: CPT

## 2021-08-16 ENCOUNTER — CARE COORDINATION (OUTPATIENT)
Dept: OTHER | Facility: CLINIC | Age: 51
End: 2021-08-16

## 2021-08-16 NOTE — CARE COORDINATION
Ambulatory Care Coordination Note  2021  CM Risk Score: 0  Charlson 10 Year Mortality Risk Score: 4%     ACC: Pauline Millan RN    Summary Note: [facility]) contacted the patient by telephone to follow up on progress, discuss new issues or concerns, and reinforce/ provide patient education. Verified name and  with patient as identifiers. Patient state she want's to return to work but has not yet received clearance from her providers. She is now seeing pulmonology and has completed the requested testing. She will see pulm tomorrow and then follow up with PCP on 21. She states she received a letter that her STD has exhausted so she attempted to call her absence manager Celio Lane on Thursday, to see what her next steps are, she is awaiting a return call. She contacted [facility] to see if [facility] was aware of her current employment status and what her next steps were, [facility] directed her back to Celio Lane her absence manger. Sushma states she started Symbicort and has noticed improvement. She notes she also has albuterol inhaler and nebulizer for as needed use. She notes continued fatigue and shortness of breath with exertion. She said today \"I don't think this is ever going to go away\". [facility] provided encouragement and education to stay on the medications, continue follow up appointments and provider recommendations, she agreed. Reinforced/ Provided Education:  Discussed red flags and appropriate site of care based on symptoms and resources available to patient including: PCP, Specialist, MyChart Messaging, Condition related references and declines MyChart set up. Importance and benefits of: Follow up with PCP and specialist, medication adherence, self monitoring and reporting of symptoms. Plan:  Continue every 2-4 week outreaches to provide telephonic support, education and resources as needed.    Discuss / follow up on: Goal progress and follow up after appts with needed for Wheezing 4/2/21   Varghese Aguilar MD   ferrous sulfate (IRON 325) 325 (65 Fe) MG tablet Take 1 tablet by mouth daily (with breakfast) 3/22/21   Varghese Aguilar MD   amLODIPine (NORVASC) 10 MG tablet Take 1 tablet by mouth daily 3/10/21   Jarvis Jung DO   metoprolol tartrate (LOPRESSOR) 50 MG tablet Take 1 tablet by mouth 2 times daily 2/18/21   Varghese Aguilar MD   albuterol sulfate HFA (VENTOLIN HFA) 108 (90 Base) MCG/ACT inhaler Inhale 2 puffs into the lungs every 4-6 hours as needed for Wheezing or Shortness of Breath 2/5/21   Gely Burr Daniels       Future Appointments   Date Time Provider Elly Blevins   8/17/2021  7:30 AM [facility] PFT STRESS LAB ROOM [facility]   8/20/2021 11:00 AM MD Renan Yan [facility]

## 2021-08-17 ENCOUNTER — HOSPITAL ENCOUNTER (OUTPATIENT)
Dept: PULMONOLOGY | Age: 51
Discharge: HOME | End: 2021-08-17
Payer: COMMERCIAL

## 2021-08-17 DIAGNOSIS — Z86.16 HISTORY OF COVID-19: ICD-10-CM

## 2021-08-17 PROCEDURE — 94729 DIFFUSING CAPACITY: CPT

## 2021-08-17 PROCEDURE — 94060 EVALUATION OF WHEEZING: CPT

## 2021-08-17 PROCEDURE — 94726 PLETHYSMOGRAPHY LUNG VOLUMES: CPT

## 2021-08-20 ENCOUNTER — OFFICE VISIT (OUTPATIENT)
Dept: FAMILY MEDICINE CLINIC | Age: 51
End: 2021-08-20
Payer: COMMERCIAL

## 2021-08-20 VITALS
HEART RATE: 120 BPM | TEMPERATURE: 98.1 F | SYSTOLIC BLOOD PRESSURE: 137 MMHG | OXYGEN SATURATION: 99 % | WEIGHT: 191 LBS | DIASTOLIC BLOOD PRESSURE: 86 MMHG | HEIGHT: 67 IN | RESPIRATION RATE: 20 BRPM | BODY MASS INDEX: 29.98 KG/M2

## 2021-08-20 DIAGNOSIS — R06.09 DYSPNEA ON EXERTION: Primary | ICD-10-CM

## 2021-08-20 DIAGNOSIS — U09.9 POST-COVID SYNDROME: ICD-10-CM

## 2021-08-20 PROCEDURE — 99213 OFFICE O/P EST LOW 20 MIN: CPT | Performed by: FAMILY MEDICINE

## 2021-08-20 PROCEDURE — 99212 OFFICE O/P EST SF 10 MIN: CPT | Performed by: FAMILY MEDICINE

## 2021-08-20 SDOH — ECONOMIC STABILITY: FOOD INSECURITY: WITHIN THE PAST 12 MONTHS, YOU WORRIED THAT YOUR FOOD WOULD RUN OUT BEFORE YOU GOT MONEY TO BUY MORE.: SOMETIMES TRUE

## 2021-08-20 SDOH — ECONOMIC STABILITY: FOOD INSECURITY: WITHIN THE PAST 12 MONTHS, THE FOOD YOU BOUGHT JUST DIDN'T LAST AND YOU DIDN'T HAVE MONEY TO GET MORE.: SOMETIMES TRUE

## 2021-08-20 ASSESSMENT — SOCIAL DETERMINANTS OF HEALTH (SDOH): HOW HARD IS IT FOR YOU TO PAY FOR THE VERY BASICS LIKE FOOD, HOUSING, MEDICAL CARE, AND HEATING?: SOMEWHAT HARD

## 2021-08-20 ASSESSMENT — LIFESTYLE VARIABLES: HOW OFTEN DO YOU HAVE A DRINK CONTAINING ALCOHOL: NEVER

## 2021-08-20 NOTE — PATIENT INSTRUCTIONS
[facility] [employer], [address] [facility]   [address] [place] [address] Rd, Marleny conner, Cami    [phone]

## 2021-08-20 NOTE — PROGRESS NOTES
Attending Physician Statement    S:   Chief Complaint   Patient presents with   [facility] [place]. Still has chronic cough, dyspnea, fatigue. Waiting for PFT's, pulmonary rehab. Unable to afford inhaler. Unable to get appointment with pulmonary  O: Blood pressure 137/86, pulse 120, temperature 98.1 °F (36.7 °C), temperature source Temporal, resp. rate 20, height 5' 6.5\" (1.689 m), weight 191 lb (86.6 kg), last menstrual period 07/26/2021, SpO2 99 %, not currently breastfeeding. Exam:   Heart - RRR   Lungs - scattered wheezes   Appears fatigued  A: As above  P:  Still await above evaluations   Echo   Check to see how much inhaler costs   Follow-up as ordered    I have discussed the case, including pertinent history and exam findings with the resident. I agree with the documented assessment and plan.

## 2021-08-20 NOTE — LETTER
[facility]  [address] [facility] [address]  Phone: [phone]  Fax: [phone]    Laovnne Denton MD        August 20, 2021     Patient: Nadia Matthews   YOB: 1970   Date of Visit: 8/20/2021       To Whom it may concern:    Nadia Matthews was seen and treated at [facility] on 8/20/2021. The patient is to remain off of work at this time due to her health condition. Please excuse the patient from work until 9/17/2021, at which time she will be re evaluated.    If you have any questions or concerns, please don't hesitate to call.  Carlene Frankel MD    .            Lavonne Denton MD

## 2021-08-20 NOTE — PROGRESS NOTES
[address] [facility]  FAMILY MEDICINE RESIDENCY PROGRAM  DATE OF VISIT : 2021    Patient : Pura Field   Age : 46 y.o.  : 1970   MRN : [id number]   ______________________________________________________________________    Chief Complaint :   Chief Complaint   Patient presents with    Check-Up       HPI : Pura Field is 46 y.o. female who presented to the clinic today for above cc. Post COVID syndrome:  Reports ongoing symptoms including cough, dyspnea on exertion (states with very minimal exertion gets easily winded still), extreme fatigue. Was prescribed Symbicort, did not check with pharmacy to see how much it would cost so has not been taking it. Was referred to pulmonology, states that she does not have appointment scheduled. Was referred to cardiac and pulmonary rehab, did have PFTs done, read is still pending. Reports daily use of albuterol daily. 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 Still endorses severe fatigue and difficulty sleeping, affecting all aspects of life. Unable to get to [facility] due to insurance issues. Due to start PT . Past Medical History :  Past Medical History:   Diagnosis Date    Hypertension      No past surgical history on file. Review of Systems :    ROS - Per HPI   ______________________________________________________________________    Physical Exam :    Vitals: /86 (Site: Left Upper Arm, Position: Sitting, Cuff Size: Medium Adult)   Pulse 120   Temp 98.1 °F (36.7 °C) (Temporal)   Resp 20   Ht 5' 6.5\" (1.689 m)   Wt 191 lb (86.6 kg)   LMP 2021   SpO2 99%   BMI 30.37 kg/m²   GENERAL: Tired appearing  CHEST: No tenderness or deformity, full & symmetric excursion  LUNG:  scattered inspiratory and expiratory wheezes  HEART: RRR, S1 and S2 normal, no murmur, rub or gallop.  DP pulses 2/4  ABDOMEN: SNTND, no masses, no organomegaly, no guarding, rebound or rigidity. EXTREMITIES:  Extremities normal, atraumatic, no cyanosis or edema. Distal pulses equal bilaterally    ______________________________________________________________________    Assessment & Plan :     Diagnosis Orders   1. Dyspnea on exertion  Echocardiogram complete   2. Post-COVID syndrome  Echocardiogram complete     Chronic post Covid syndrome: Symptoms remain unchanged, awaiting pulmonary evaluation. PFTs done, read pending. Awaiting to start pulmonary rehab. In light of ongoing dyspnea on exertion, unremitting symptoms, will obtain echocardiogram to rule out Covid induced myocarditis. Patient to check with pharmacy to see cost of Symbicort, encouraged patient to pick this up and start using to assess response of symptoms to daily inhaler therapy. Follow up:  Return in about 4 weeks (around 9/17/2021) for post COVID f/u.         Maryuri Vega MD PGY-3    Discussed with: Dr. Mariluz Aguiar

## 2021-08-23 ENCOUNTER — TELEPHONE (OUTPATIENT)
Dept: FAMILY MEDICINE CLINIC | Age: 51
End: 2021-08-23

## 2021-08-23 NOTE — TELEPHONE ENCOUNTER
[address] [facility] cardiology department called in today to let us know about pt scheduled ECHO for 9- @10 am. I called pt but she did not answer so I left her a voice message with time and date of ECHO and location as well.

## 2021-08-30 PROCEDURE — 94060 EVALUATION OF WHEEZING: CPT | Performed by: INTERNAL MEDICINE

## 2021-08-30 PROCEDURE — 94726 PLETHYSMOGRAPHY LUNG VOLUMES: CPT | Performed by: INTERNAL MEDICINE

## 2021-08-30 PROCEDURE — 94729 DIFFUSING CAPACITY: CPT | Performed by: INTERNAL MEDICINE

## 2021-09-01 ENCOUNTER — CARE COORDINATION (OUTPATIENT)
Dept: OTHER | Facility: CLINIC | Age: 51
End: 2021-09-01

## 2021-09-01 NOTE — CARE COORDINATION
Ambulatory Care Coordination Note  9/1/2021  CM Risk Score: 0  Charlson 10 Year Mortality Risk Score: 4%     ACC: Ruben Whitaker, RN    Summary Note: ACM attempted to reach patient for follow up call regarding CM f/u. ACM notes in f/u visit that patient is unable to afford her inhaler and schedule pulmonary appt. Patient has worked with  in the past but ACM will discuss a second referral to  if appropriate. HIPAA compliant message left requesting a return phone call at patients convenience. Will continue to follow.            Care Coordination Interventions    Program Enrollment: Complex Care  Referral from Primary Care Provider: No  Suggested Interventions and Community Resources  Behavorial Health: Completed (Comment: New Pt appt Dr. Rexene Holter 6/17/21)  Disease Specific Clinic: Completed (Comment: [address] 6/15/21)  Physical Therapy: Completed (Comment: Ordered 6/15/21)  Social Work: Completed (Comment: Referral Sent to [facility] 4/21/21)           Future Appointments   Date Time Provider Elly Delacruzisti   9/17/2021 10:20 AM MD Chris Ro [facility]   9/29/2021 10:30 AM [facility] ECHO LAB RM-2 [facility] ECHO [facility]

## 2021-09-08 ENCOUNTER — CARE COORDINATION (OUTPATIENT)
Dept: OTHER | Facility: CLINIC | Age: 51
End: 2021-09-08

## 2021-09-08 NOTE — CARE COORDINATION
Ambulatory Care Coordination Note  9/8/2021  CM Risk Score: 0  Charlson 10 Year Mortality Risk Score: 4%     ACC: Asuncion Meraz, RN    Summary Note: [facility] attempted to reach patient for follow up call regarding follow up after PCP appt and pulmonary testing. HIPAA compliant message left requesting a return phone call at patients convenience. Will continue to follow.            Care Coordination Interventions    Program Enrollment: Complex Care  Referral from Primary Care Provider: No  Suggested Interventions and Community Resources  Behavorial Health: Completed (Comment: New Pt appt Dr. Coy Pallas 6/17/21)  Disease Specific Clinic: Completed (Comment: [address] 6/15/21)  Physical Therapy: Completed (Comment: Ordered 6/15/21)  Social Work: Completed (Comment: Referral Sent to [facility] 4/21/21)           Future Appointments   Date Time Provider Elly Blevins   9/17/2021 10:20 AM Farzana Chavarria MD [facility]   9/29/2021 10:30 AM [facility] ECHO LAB RM-2 SE[facility] ECHO Nathaniel HOD

## 2021-09-14 ENCOUNTER — CARE COORDINATION (OUTPATIENT)
Dept: OTHER | Facility: CLINIC | Age: 51
End: 2021-09-14

## 2021-09-14 ENCOUNTER — HOSPITAL ENCOUNTER (OUTPATIENT)
Dept: PULMONOLOGY | Age: 51
Setting detail: THERAPIES SERIES
Discharge: HOME | End: 2021-09-14
Payer: COMMERCIAL

## 2021-09-14 VITALS
HEIGHT: 67 IN | RESPIRATION RATE: 20 BRPM | WEIGHT: 193 LBS | HEART RATE: 128 BPM | DIASTOLIC BLOOD PRESSURE: 88 MMHG | BODY MASS INDEX: 30.29 KG/M2 | SYSTOLIC BLOOD PRESSURE: 158 MMHG | OXYGEN SATURATION: 99 %

## 2021-09-14 PROCEDURE — G0424 PULMONARY REHAB W EXER: HCPCS

## 2021-09-14 ASSESSMENT — PATIENT HEALTH QUESTIONNAIRE - PHQ9
SUM OF ALL RESPONSES TO PHQ QUESTIONS 1-9: 14
SUM OF ALL RESPONSES TO PHQ QUESTIONS 1-9: 14

## 2021-09-14 NOTE — LETTER
[id number] [facility]        September 14, 2021        Dear Oliverio Ratliff,     I have been trying to reach you for a follow up call for services with our Associate Care Management Program. Your wellbeing is very important to us. With continued partnership in the [facility] program, we hope to work with you to optimize your health and increase your quality of life. I look forward to continuing to work with you. Please contact me at your convenience and we can schedule a time that works best for you. Sincerely,    Castillo Putnam RN, BSN   Associate Care Manager   [phone]  [email or web address]

## 2021-09-14 NOTE — CARE COORDINATION
Ambulatory Care Coordination Note  9/14/2021  CM Risk Score: 0  Charlson 10 Year Mortality Risk Score: 4%     ACC: Bella Salinas, RN    Summary Note: [facility] attempted to reach patient for follow up call regarding CM f/u. HIPAA compliant message left requesting a return phone call at patients convenience. Lost to follow up letter mailed, [facility] [facility] will attempt final outreach in 2 weeks.          Care Coordination Interventions    Program Enrollment: Complex Care  Referral from Primary Care Provider: No  Suggested Interventions and Community Resources  Behavorial Health: Completed (Comment: New Pt appt Dr. Patricia Herring 6/17/21)  Disease Specific Clinic: Completed (Comment: [address] 6/15/21)  Physical Therapy: Completed (Comment: Ordered 6/15/21)  Social Work: Completed (Comment: Referral Sent to [facility] 4/21/21)       Future Appointments   Date Time Provider Elly Blevins   9/15/2021 11:00 AM Christine Hanna, PhD [facility]   9/17/2021 10:20 AM Maryuri Vega MD [facility]   9/29/2021 10:30 AM [facility] ECHO LAB RM-2 SE[facility] ECHO [facility]   10/5/2021  9:00 AM SCHEDULE, [facility] NUTRITION ROOM [facility]

## 2021-09-17 ENCOUNTER — OFFICE VISIT (OUTPATIENT)
Dept: FAMILY MEDICINE CLINIC | Age: 51
End: 2021-09-17
Payer: COMMERCIAL

## 2021-09-17 VITALS
HEART RATE: 106 BPM | HEIGHT: 67 IN | TEMPERATURE: 97.3 F | DIASTOLIC BLOOD PRESSURE: 82 MMHG | WEIGHT: 196 LBS | SYSTOLIC BLOOD PRESSURE: 137 MMHG | RESPIRATION RATE: 16 BRPM | BODY MASS INDEX: 30.76 KG/M2 | OXYGEN SATURATION: 98 %

## 2021-09-17 DIAGNOSIS — U09.9 CHRONIC POST-COVID-19 SYNDROME: ICD-10-CM

## 2021-09-17 DIAGNOSIS — U07.1 COVID-19: ICD-10-CM

## 2021-09-17 DIAGNOSIS — I10 ESSENTIAL HYPERTENSION: ICD-10-CM

## 2021-09-17 PROCEDURE — 99213 OFFICE O/P EST LOW 20 MIN: CPT | Performed by: FAMILY MEDICINE

## 2021-09-17 PROCEDURE — 99212 OFFICE O/P EST SF 10 MIN: CPT | Performed by: FAMILY MEDICINE

## 2021-09-17 RX ORDER — AMLODIPINE BESYLATE 10 MG/1
10 TABLET ORAL DAILY
Qty: 30 TABLET | Refills: 3 | Status: SHIPPED
Start: 2021-09-17 | End: 2022-09-01

## 2021-09-17 RX ORDER — METOPROLOL TARTRATE 50 MG/1
50 TABLET, FILM COATED ORAL 2 TIMES DAILY
Qty: 180 TABLET | Refills: 3 | Status: SHIPPED
Start: 2021-09-17 | End: 2022-08-11 | Stop reason: SDUPTHER

## 2021-09-17 RX ORDER — CHLORTHALIDONE 25 MG/1
25 TABLET ORAL DAILY
Qty: 30 TABLET | Refills: 3 | Status: SHIPPED
Start: 2021-09-17 | End: 2021-12-19 | Stop reason: SINTOL

## 2021-09-17 RX ORDER — ALBUTEROL SULFATE 90 UG/1
2 AEROSOL, METERED RESPIRATORY (INHALATION)
Qty: 1 EACH | Refills: 3 | Status: SHIPPED
Start: 2021-09-17 | End: 2022-04-26 | Stop reason: SDUPTHER

## 2021-09-17 RX ORDER — BUDESONIDE AND FORMOTEROL FUMARATE DIHYDRATE 160; 4.5 UG/1; UG/1
2 AEROSOL RESPIRATORY (INHALATION) 2 TIMES DAILY
Qty: 1 EACH | Refills: 3 | Status: SHIPPED
Start: 2021-09-17 | End: 2022-04-26 | Stop reason: SDUPTHER

## 2021-09-17 RX ORDER — POTASSIUM CHLORIDE 20 MEQ/1
20 TABLET, EXTENDED RELEASE ORAL 2 TIMES DAILY
Qty: 30 TABLET | Refills: 0 | Status: SHIPPED
Start: 2021-09-17 | End: 2022-02-25

## 2021-09-17 NOTE — PROGRESS NOTES
59-year-old female with history of hypertension here for post Covid follow-up. Patient has been dealing with Covid long-haul syndrome since Covid diagnoses in February 2021. Symptoms are overall stable, continues to deal with exertional dyspnea. Using inhalers both daily combo inhaler as well as albuterol as needed. Has been requiring albuterol almost daily which has been her baseline. Continues to be dyspneic with minimal exertion. Also continues to endorse fatigue. Overall feels that headaches are improving, has been headache free for few days now. Continuing to deal with depression anxiety surrounding situation, has not followed up with Dr. Tiera Luna. Missed last appointment due to oversleeping. Patient has rescheduled for Monday. Patient has begun working with pulmonary rehab. PFTs were obtained, nonspecific, patterns consistent with post Covid lung function. No specific COPD or asthma appreciated. Hypertension: Well-controlled on current medications, requesting refills today. Blood pressure 137/82, pulse 106, temperature 97.3 °F (36.3 °C), temperature source Temporal, resp. rate 16, height 5' 6.5\" (1.689 m), weight 196 lb (88.9 kg), last menstrual period 08/30/2021, SpO2 98 %, not currently breastfeeding. HEENT WNL     Heart regular    Lungs scattered faint expiratory wheezes all lung fields, speaking in full sentences, not dyspneic   abd non-tender      No edema    Pulses intact       Assessment plan  Post Covid syndrome: Overall stable with minimal improvement, continue with pulmonary rehab, continue inhalers. Advised patient strongly to follow with Dr. Tiera Luna to deal with psychosocial issues surrounding post Covid syndrome. Declining to begin Zoloft at this time. Hypertension: Stable well-controlled, refill medications today    Return to clinic in 4 weeks for post Covid follow-up.     Attending Physician Statement  I have discussed the case, including pertinent history and exam findings with the resident. I agree with the documented assessment and plan.

## 2021-09-17 NOTE — PATIENT INSTRUCTIONS
partner. · Keep your bedroom quiet, dark, and cool. Use curtains, blinds, or a sleep mask to block out light. To block out noise, use earplugs, soothing music, or a \"white noise\" machine. Your evening and bedtime routine   · Create a relaxing bedtime routine. You might want to take a warm shower or bath, listen to soothing music, or drink a cup of noncaffeinated tea. · Go to bed at the same time every night. And get up at the same time every morning, even if you feel tired. What to avoid   · Limit caffeine (coffee, tea, caffeinated sodas) during the day, and don't have any for at least 4 to 6 hours before bedtime. · Don't drink alcohol before bedtime. Alcohol can cause you to wake up more often during the night. · Don't smoke or use tobacco, especially in the evening. Nicotine can keep you awake. · Don't take naps during the day, especially close to bedtime. · Don't lie in bed awake for too long. If you can't fall asleep, or if you wake up in the middle of the night and can't get back to sleep within 15 minutes or so, get out of bed and go to another room until you feel sleepy. · Don't take medicine right before bed that may keep you awake or make you feel hyper or energized. Your doctor can tell you if your medicine may do this and if you can take it earlier in the day. If you can't sleep   · Imagine yourself in a peaceful, pleasant scene. Focus on the details and feelings of being in a place that is relaxing. · Get up and do a quiet or boring activity until you feel sleepy. · Don't drink any liquids after 6 p.m. if you wake up often because you have to go to the bathroom. Where can you learn more? Go to [email or web address]. org and sign in to your [employer] Search account. Enter I791 in the [employer] box to learn more about \"Learning About Sleeping Well. \"     If you do not have an account, please click on the \"Sign Up Now\" link.   Current as of: September 23, 2020               Content Version: 12.9  © 1147-0432 Healthwise, Incorporated. Care instructions adapted under license by [facility] ([facility]). If you have questions about a medical condition or this instruction, always ask your healthcare professional. [address] any warranty or liability for your use of this information.

## 2021-09-17 NOTE — PROGRESS NOTES
kg/m²   GENERAL: Tired appearing  CHEST: No tenderness or deformity, full & symmetric excursion  LUNG:  scattered inspiratory and expiratory wheezes  HEART: RRR, S1 and S2 normal, no murmur, rub or gallop. DP pulses 2/4  ABDOMEN: SNTND, no masses, no organomegaly, no guarding, rebound or rigidity. EXTREMITIES:  Extremities normal, atraumatic, no cyanosis or edema. Distal pulses equal bilaterally    ______________________________________________________________________    Assessment & Plan :     Diagnosis Orders   1. Essential hypertension  chlorthalidone (HYGROTON) 25 MG tablet    amLODIPine (NORVASC) 10 MG tablet    metoprolol tartrate (LOPRESSOR) 50 MG tablet    potassium chloride (KLOR-CON M) 20 MEQ extended release tablet   2. COVID-19  albuterol sulfate HFA (VENTOLIN HFA) 108 (90 Base) MCG/ACT inhaler   3. Chronic post-COVID-19 syndrome  budesonide-formoterol (SYMBICORT) 160-4.5 MCG/ACT AERO     Post Covid syndrome: Overall stable with minimal improvement, continue with pulmonary rehab, continue inhalers. Advised patient strongly to follow with Dr. Andreina Escobar to deal with psychosocial issues surrounding post Covid syndrome. Declining to begin Zoloft at this time. Hypertension: Stable well-controlled, refill medications today    Follow up:  Return in about 4 weeks (around 10/15/2021) for post COVID.         Melissa Iniguez MD PGY-3    Discussed with: Dr. Justina Leroy

## 2021-09-20 ENCOUNTER — OFFICE VISIT (OUTPATIENT)
Dept: PSYCHOLOGY | Age: 51
End: 2021-09-20
Payer: COMMERCIAL

## 2021-09-20 DIAGNOSIS — F41.9 ANXIETY DISORDER, UNSPECIFIED TYPE: ICD-10-CM

## 2021-09-20 DIAGNOSIS — F33.2 SEVERE EPISODE OF RECURRENT MAJOR DEPRESSIVE DISORDER, WITHOUT PSYCHOTIC FEATURES (HCC): Primary | ICD-10-CM

## 2021-09-20 PROCEDURE — 90791 PSYCH DIAGNOSTIC EVALUATION: CPT | Performed by: PSYCHOLOGIST
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
## 2021-09-21 ENCOUNTER — HOSPITAL ENCOUNTER (OUTPATIENT)
Dept: PULMONOLOGY | Age: 51
Setting detail: THERAPIES SERIES
Discharge: HOME | End: 2021-09-21
Payer: COMMERCIAL

## 2021-09-21 PROCEDURE — G0424 PULMONARY REHAB W EXER: HCPCS

## 2021-09-21 ASSESSMENT — ANXIETY QUESTIONNAIRES
7. FEELING AFRAID AS IF SOMETHING AWFUL MIGHT HAPPEN: 3
5. BEING SO RESTLESS THAT IT IS HARD TO SIT STILL: 2
3. WORRYING TOO MUCH ABOUT DIFFERENT THINGS: 3
6. BECOMING EASILY ANNOYED OR IRRITABLE: 3
GAD7 TOTAL SCORE: 20
4. TROUBLE RELAXING: 3
2. NOT BEING ABLE TO STOP OR CONTROL WORRYING: 3
1. FEELING NERVOUS, ANXIOUS, OR ON EDGE: 3

## 2021-09-21 ASSESSMENT — PATIENT HEALTH QUESTIONNAIRE - PHQ9
SUM OF ALL RESPONSES TO PHQ9 QUESTIONS 1 & 2: 6
5. POOR APPETITE OR OVEREATING: 0
2. FEELING DOWN, DEPRESSED OR HOPELESS: 3
7. TROUBLE CONCENTRATING ON THINGS, SUCH AS READING THE NEWSPAPER OR WATCHING TELEVISION: 3
1. LITTLE INTEREST OR PLEASURE IN DOING THINGS: 3
SUM OF ALL RESPONSES TO PHQ QUESTIONS 1-9: 20
SUM OF ALL RESPONSES TO PHQ QUESTIONS 1-9: 20
8. MOVING OR SPEAKING SO SLOWLY THAT OTHER PEOPLE COULD HAVE NOTICED. OR THE OPPOSITE, BEING SO FIGETY OR RESTLESS THAT YOU HAVE BEEN MOVING AROUND A LOT MORE THAN USUAL: 2
SUM OF ALL RESPONSES TO PHQ QUESTIONS 1-9: 20
6. FEELING BAD ABOUT YOURSELF - OR THAT YOU ARE A FAILURE OR HAVE LET YOURSELF OR YOUR FAMILY DOWN: 3
3. TROUBLE FALLING OR STAYING ASLEEP: 3
9. THOUGHTS THAT YOU WOULD BE BETTER OFF DEAD, OR OF HURTING YOURSELF: 0
4. FEELING TIRED OR HAVING LITTLE ENERGY: 3

## 2021-09-23 ENCOUNTER — APPOINTMENT (OUTPATIENT)
Dept: PULMONOLOGY | Age: 51
End: 2021-09-23
Payer: COMMERCIAL

## 2021-09-28 ENCOUNTER — APPOINTMENT (OUTPATIENT)
Dept: PULMONOLOGY | Age: 51
End: 2021-09-28
Payer: COMMERCIAL

## 2021-09-29 ENCOUNTER — HOSPITAL ENCOUNTER (OUTPATIENT)
Dept: NON INVASIVE DIAGNOSTICS | Age: 51
Discharge: HOME | End: 2021-09-29
Payer: COMMERCIAL

## 2021-09-29 DIAGNOSIS — U09.9 POST-COVID SYNDROME: ICD-10-CM

## 2021-09-29 DIAGNOSIS — R06.09 DYSPNEA ON EXERTION: ICD-10-CM

## 2021-09-29 LAB
LV EF: 63 %
LVEF MODALITY: NORMAL

## 2021-09-29 PROCEDURE — 93306 TTE W/DOPPLER COMPLETE: CPT

## 2021-09-30 ENCOUNTER — HOSPITAL ENCOUNTER (OUTPATIENT)
Dept: PULMONOLOGY | Age: 51
Setting detail: THERAPIES SERIES
Discharge: HOME | End: 2021-09-30
Payer: COMMERCIAL

## 2021-09-30 PROCEDURE — G0424 PULMONARY REHAB W EXER: HCPCS

## 2021-10-04 ENCOUNTER — TELEPHONE (OUTPATIENT)
Dept: PULMONOLOGY | Age: 51
End: 2021-10-04

## 2021-10-04 NOTE — TELEPHONE ENCOUNTER
10/4/2021 1645 Nutrition: Attempted to reach patient on this date by phone regarding confirming appointment for 10/5/2021 at 9:00 am.  Mailbox full and unable to accept messages. Will remain available.  Electronically signed by Russella Cockayne, RD, LD on 10/4/21 at 4:52 PM EDT

## 2021-10-05 ENCOUNTER — HOSPITAL ENCOUNTER (OUTPATIENT)
Dept: PULMONOLOGY | Age: 51
Setting detail: THERAPIES SERIES
End: 2021-10-05
Payer: COMMERCIAL

## 2021-10-07 ENCOUNTER — APPOINTMENT (OUTPATIENT)
Dept: PULMONOLOGY | Age: 51
End: 2021-10-07
Payer: COMMERCIAL

## 2021-10-11 ENCOUNTER — CARE COORDINATION (OUTPATIENT)
Dept: OTHER | Facility: CLINIC | Age: 51
End: 2021-10-11

## 2021-10-11 NOTE — CARE COORDINATION
Ambulatory Care Coordination Note  10/11/2021  CM Risk Score: 0  Charlson 10 Year Mortality Risk Score: 4%     ACC: Bobby Oconnell, RN    Summary Note: [facility]) contacted the patient by telephone to follow up on progress, discuss new issues or concerns, and reinforce/ provide patient education. Verified name and  with patient as identifiers. Patient returned [facility]'s call stating she has been looking for the other Nikki's number (referring to [facility] teams SW). She states she received assistance from the [facility] in July to pay her rent. She is still out of work and suffering from [place] syndrome. She states she continues to receive bills she cant pay including her rent. 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 [facility] provided active listening but did reminded patient that she and Blake CORDERO discussed her needs previously and patient declined many services but did reach out for the [employer]. [facility] offered for SW to reach back out and discuss her financial and resources needs again, patient expressed gratitude and asked for Blake CORDERO to reach out to her. Sushma states she has not yet been released back to work and that she is unsure when she can return. She states she is not getting any better. She is currently in pulmonary rehab and was told she will also need cardiac rehab ([facility] does not see a note regarding this in Epic). Although, it took [facility] a while to start follow up treatment she is now following closely with her PCP Dr. Deja Richey, Pulmonology Dr. Victor Hugo Sutton and pulmonary rehab as well as Psychology Dr. Naseem Rubio. She does still miss telephone or in person appointments still; discussed using alarms and reminders to prevent missing appointments.      Sushma declined any needs from ACM today, she wishes to speak to [facility] [place] Polly Johansen will send message to SW requesting follow up with patient. Reinforced/ Provided Education:  Discussed red flags and appropriate site of care based on symptoms and resources available to patient including: PCP, Specialist, Benefits related nurse triage line, Urgent care clinics and Condition related references. Importance and benefits of: Follow up with PCP and specialist, medication adherence, self monitoring and reporting of symptoms. Keeping scheduled appointments. Plan:  Continue biweekly outreaches to provide telephonic support, education and resources as needed. Discuss / follow up on: Goal progress and SW needs. Once SW needs are addressed, [facility] will assess for graduation as patient long hauler symptoms are unchanged/stable and she is actively seeking treatment. Pt verbalized understanding and is agreeable to follow up contact. Care Coordination Interventions    Program Enrollment: Complex Care  Referral from Primary Care Provider: No  Suggested Interventions and Community Resources  [facility] (Comment: New Pt appt Dr. José Miguel Tao 6/17/21)  Disease Specific Clinic: Completed (Comment: [address] 6/15/21)  Physical Therapy: Completed (Comment: Ordered 6/15/21)  Social Work: Completed (Comment: Referral Sent to [facility] SW 4/21/21)         Goals Addressed                 This Visit's Progress     COMPLETED: Conditions and Symptoms   On track     I will schedule office visits, as directed by my provider. I will notify my provider of any symptoms that indicate a worsening of my condition. Barriers: fear of failure, overwhelmed by complexity of regimen, and lack of education, financial   Plan for overcoming my barriers: Work with [facility] to better understand my Red Flags and Referrals. Work with [facility] SW to identify financial assistance as available.    Confidence: 7/10  Anticipated Goal Completion Date: 6/1/21 - updated to 9/1/21 8/16/21- Patient has completed the testing requested by pulmonology, she has a f/u pulmonology appt and PCP appointment this month.                      Future Appointments   Date Time Provider Elly Parsoni   10/12/2021  8:00 AM [place] PULMONARY REHAB ROOM 2 [facility] [facility]   10/14/2021  8:00 AM [facility] PULMONARY REHAB ROOM 2 [facility] [facility]   10/19/2021  8:00 AM [facility] PULMONARY REHAB ROOM 2 [facility] [facility]   10/21/2021  8:00 AM [facility] PULMONARY REHAB ROOM 2 [facility] [facility]   10/26/2021  8:00 AM [facility] PULMONARY REHAB ROOM 2 [facility] [facility]   10/28/2021  8:00 AM [facility] PULMONARY REHAB ROOM 2 [facility] [facility]   11/2/2021  8:00 AM [facility] PULMONARY REHAB ROOM 2 [facility] [facility]   11/4/2021  8:00 AM [facility] PULMONARY REHAB ROOM 2 [facility] [facility]   11/9/2021  8:00 AM [facility] PULMONARY REHAB ROOM 2 [facility] [facility]   11/11/2021  8:00 AM [facility] PULMONARY REHAB ROOM 2 [facility] [facility]   11/16/2021  8:00 AM [facility] PULMONARY REHAB ROOM 2 [facility] [facility]   11/18/2021  8:00 AM [facility] PULMONARY REHAB ROOM 2 [facility] [facility]   11/23/2021  8:00 AM [facility] PULMONARY REHAB ROOM 2 [facility] [facility]   11/30/2021  8:00 AM [facility] PULMONARY REHAB ROOM 2 [facility] [facility]   12/2/2021  8:00 AM [facility] PULMONARY REHAB ROOM 2 [facility] [facility]   12/7/2021  8:00 AM [facility] PULMONARY REHAB ROOM 2 [facility] [facility]   12/9/2021  8:00 AM [facility] PULMONARY REHAB ROOM 2 [facility] [facility]   12/14/2021  8:00 AM [facility] PULMONARY REHAB ROOM 2 [facility] [facility]   12/16/2021  8:00 AM [facility] PULMONARY REHAB ROOM 2 [facility] [facility]   12/21/2021  8:00 AM [facility] PULMONARY REHAB ROOM 2 [facility] [facility] [facility]   12/23/2021  8:00 AM [facility] PULMONARY REHAB ROOM 2 [facility] [facility] [facility]   12/28/2021  8:00 AM [facility] PULMONARY REHAB ROOM 2 [facility]   12/30/2021  8:00 AM [facility] PULMONARY REHAB ROOM 2 [facility]   1/4/2022  8:00 AM [facility] PULMONARY REHAB ROOM 2 [facility]   1/6/2022  8:00 AM [facility] PULMONARY REHAB ROOM 2 [facility] [facility]   1/11/2022  8:00 AM [facility] PULMONARY REHAB ROOM 2 [facility]   1/13/2022  8:00 AM [facility] PULMONARY REHAB ROOM 2 [facility] [facility]   1/18/2022  8:00 AM [facility] PULMONARY REHAB ROOM 2 [facility] [facility]   1/20/2022  8:00 AM [facility] PULMONARY REHAB ROOM 2 SEYZ PULM Prem Baez

## 2021-10-11 NOTE — CARE COORDINATION
Ambulatory Care Coordination Note  10/11/2021  CM Risk Score: 0  Charlson 10 Year Mortality Risk Score: 4%     ACC: Brandee Llamas RN    Summary Note: ACM attempted to reach patient for follow up call regarding CM follow up and/or graduation assessment. HIPAA compliant message left requesting a return phone call at patients convenience. Final outreach attempt. No further outreach scheduled with this ACM, ACM will sign off care team at this time. Episode of Care resolved. Patient has this ACM's contact information if future needs arise. Care Coordination Interventions    Program Enrollment: Complex Care  Referral from Primary Care Provider: No  Suggested Interventions and Community Resources  [facility] (Comment: New Pt appt Dr. Elizabeth Augustine 6/17/21)  Disease Specific Clinic: Completed (Comment: [address] 6/15/21)  Physical Therapy: Completed (Comment: Ordered 6/15/21)  Social Work: Completed (Comment: Referral Sent to [facility] 4/21/21)         Goals Addressed                 This Visit's Progress     COMPLETED: Conditions and Symptoms   On track     I will schedule office visits, as directed by my provider. I will notify my provider of any symptoms that indicate a worsening of my condition. Barriers: fear of failure, overwhelmed by complexity of regimen, and lack of education, financial   Plan for overcoming my barriers: Work with [facility] to better understand my Red Flags and Referrals. Work with [facility] to identify financial assistance as available. Confidence: 7/10  Anticipated Goal Completion Date: 6/1/21 - updated to 9/1/21 8/16/21- Patient has completed the testing requested by pulmonology, she has a f/u pulmonology appt and PCP appointment this month.                      Future Appointments   Date Time Provider Elly Blevins   10/12/2021  8:00 AM [facility] PULMONARY REHAB ROOM 2 YARELY Villagran   10/14/2021  8:00 AM [facility] PULMONARY REHAB ROOM 2 [facility]ARELY [facility] [place]   10/19/2021  8:00 AM [facility] PULMONARY REHAB ROOM 2 SEYZ PULM [facility] [place]   10/21/2021  8:00 AM [facility] PULMONARY REHAB ROOM 2 SEYZ P[facility]   10/26/2021  8:00 AM [facility] PULMONARY REHAB ROOM 2 SEYZ PUL[facility]   10/28/2021  8:00 AM [facility] PULMONARY REHAB ROOM 2 SEYZ P[facility]   11/2/2021  8:00 AM [facility] PULMONARY REHAB ROOM 2 SEYZ P[facility]   11/4/2021  8:00 AM [facility] PULMONARY REHAB ROOM 2 SEYZ P[facility]   11/9/2021  8:00 AM [facility] PULMONARY REHAB ROOM 2 SEYZ [facility]   11/11/2021  8:00 AM [facility] PULMONARY REHAB ROOM 2 SEYZ [facility]   11/16/2021  8:00 AM [facility] PULMONARY REHAB ROOM 2 SEYZ [facility]   11/18/2021  8:00 AM [facility] PULMONARY REHAB ROOM 2 [facility]   11/23/2021  8:00 AM [facility] PULMONARY REHAB ROOM 2 SEYZ [facility]   11/30/2021  8:00 AM [facility] PULMONARY REHAB ROOM 2 SEYZ [facility]   12/2/2021  8:00 AM [facility] PULMONARY REHAB ROOM 2 SEYZ P[facility]   12/7/2021  8:00 AM [facility] PULMONARY REHAB ROOM 2 [facility]YZ [facility]   12/9/2021  8:00 AM [facility] PULMONARY REHAB ROOM 2 [facility]   12/14/2021  8:00 AM [facility] PULMONARY REHAB ROOM 2 [facility]Z [facility]   12/16/2021  8:00 AM [facility] PULMONARY REHAB ROOM 2 SEYZ [facility]   12/21/2021  8:00 AM [facility] PULMONARY REHAB ROOM 2 SEYZ [facility]   12/23/2021  8:00 AM [facility] PULMONARY REHAB ROOM 2 [facility]YZ [facility]   12/28/2021  8:00 AM [facility] PULMONARY REHAB ROOM 2 [facility]Z [facility]   12/30/2021  8:00 AM [facility] PULMONARY REHAB ROOM 2 [facility]Z [facility]   1/4/2022  8:00 AM [facility] PULMONARY REHAB ROOM 2 [facility]Z [facility]   1/6/2022  8:00 AM [facility] PULMONARY REHAB ROOM 2 [facility]YZ [facility]   1/11/2022  8:00 AM [facility] PULMONARY REHAB ROOM 2 [facility]YZ [facility]   1/13/2022  8:00 AM [facility] PULMONARY REHAB ROOM 2 [facility]   1/18/2022  8:00 AM [facility] PULMONARY REHAB ROOM 2 [facility]   1/20/2022  8:00 AM [facility] PULMONARY REHAB ROOM

## 2021-10-12 ENCOUNTER — HOSPITAL ENCOUNTER (OUTPATIENT)
Dept: PULMONOLOGY | Age: 51
Setting detail: THERAPIES SERIES
Discharge: HOME | End: 2021-10-12
Payer: COMMERCIAL

## 2021-10-12 PROCEDURE — G0424 PULMONARY REHAB W EXER: HCPCS

## 2021-10-14 ENCOUNTER — CARE COORDINATION (OUTPATIENT)
Dept: OTHER | Facility: CLINIC | Age: 51
End: 2021-10-14

## 2021-10-14 ENCOUNTER — HOSPITAL ENCOUNTER (OUTPATIENT)
Dept: PULMONOLOGY | Age: 51
Setting detail: THERAPIES SERIES
Discharge: HOME | End: 2021-10-14
Payer: COMMERCIAL

## 2021-10-14 PROCEDURE — G0424 PULMONARY REHAB W EXER: HCPCS

## 2021-10-14 NOTE — CARE COORDINATION
help her this month. Writer asked if pt had reached out to [facility] billing about financial assistance - pt denied, stating she doesn't have anything to pay them. Pt seems reluctant to apply for any assistance that isn't related to rent, which writer will respect. Pt states she needs to get back to work, but she gets so winded that she can't do what she was in her previous role. Writer searched for rental assistance in [facility] and found the [employer]. Website states they assist with mortgage/rental costs as well as gas, electric and water bills. Writer provided pt with number ([phone] ). Advised pt to apply for both sources of assistance ASAP and at the same time, instead of waiting to hear from one or the other before moving on. Pt VU. Writer to follow up in a few weeks unless pt reaches out about difficulty with [employer]. Pt agreeable.      Chrystine Boeck, MSW, [facility]    Associate Care Management   [phone]

## 2021-10-19 ENCOUNTER — HOSPITAL ENCOUNTER (OUTPATIENT)
Dept: PULMONOLOGY | Age: 51
Setting detail: THERAPIES SERIES
Discharge: HOME | End: 2021-10-19
Payer: COMMERCIAL

## 2021-10-19 PROCEDURE — G0424 PULMONARY REHAB W EXER: HCPCS

## 2021-10-21 ENCOUNTER — HOSPITAL ENCOUNTER (OUTPATIENT)
Dept: PULMONOLOGY | Age: 51
Setting detail: THERAPIES SERIES
Discharge: HOME | End: 2021-10-21
Payer: COMMERCIAL

## 2021-10-21 PROCEDURE — G0424 PULMONARY REHAB W EXER: HCPCS

## 2021-10-28 ENCOUNTER — TELEPHONE (OUTPATIENT)
Dept: FAMILY MEDICINE CLINIC | Age: 51
End: 2021-10-28

## 2021-10-28 ENCOUNTER — HOSPITAL ENCOUNTER (OUTPATIENT)
Dept: PULMONOLOGY | Age: 51
Setting detail: THERAPIES SERIES
Discharge: HOME | End: 2021-10-28
Payer: COMMERCIAL

## 2021-10-28 PROCEDURE — G0424 PULMONARY REHAB W EXER: HCPCS

## 2021-10-28 NOTE — TELEPHONE ENCOUNTER
Pulmonary Rehab called to report the patient had a blood pressure of 172/100 at the rehab this morning. The respiratory therapist did not have her exercise today.   She has an appointment with you on 11/1/2021

## 2021-10-28 NOTE — TELEPHONE ENCOUNTER
Is she taking all of her meds or does she need refills?  Please offer same-day/next day appt to pt thank you

## 2021-11-09 ENCOUNTER — HOSPITAL ENCOUNTER (OUTPATIENT)
Dept: PULMONOLOGY | Age: 51
Setting detail: THERAPIES SERIES
Discharge: HOME | End: 2021-11-09
Payer: COMMERCIAL

## 2021-11-09 PROCEDURE — G0424 PULMONARY REHAB W EXER: HCPCS

## 2021-11-10 ENCOUNTER — TELEPHONE (OUTPATIENT)
Dept: FAMILY MEDICINE CLINIC | Age: 51
End: 2021-11-10

## 2021-11-10 NOTE — TELEPHONE ENCOUNTER
Sushma called in and is asking for a refill on her protonix, I could not find it on her current me list but she states that she has been taking it.     Please advise    Thank you

## 2021-11-11 ENCOUNTER — TELEPHONE (OUTPATIENT)
Dept: FAMILY MEDICINE CLINIC | Age: 51
End: 2021-11-11

## 2021-11-11 NOTE — TELEPHONE ENCOUNTER
----- Message from Bimal Cowden sent at 11/11/2021  2:59 PM EST -----  Subject: Message to Provider    QUESTIONS  Information for Provider? Patient is calling to get in contact with Makayla Temple Please contact patient as soon as possible.  ---------------------------------------------------------------------------  --------------  CALL BACK INFO  What is the best way for the office to contact you? OK to leave message on   voicemail  Preferred Call Back Phone Number? [phone]  ---------------------------------------------------------------------------  --------------  SCRIPT ANSWERS  Relationship to Patient?  Self

## 2021-11-11 NOTE — TELEPHONE ENCOUNTER
I do not see this medication on her past medications list. Recommend evaluation in office before prescribing this medication thank you

## 2021-11-16 ENCOUNTER — HOSPITAL ENCOUNTER (OUTPATIENT)
Dept: PULMONOLOGY | Age: 51
Setting detail: THERAPIES SERIES
Discharge: HOME | End: 2021-11-16
Payer: COMMERCIAL

## 2021-11-16 PROCEDURE — G0424 PULMONARY REHAB W EXER: HCPCS

## 2021-11-30 ENCOUNTER — HOSPITAL ENCOUNTER (OUTPATIENT)
Dept: PULMONOLOGY | Age: 51
Setting detail: THERAPIES SERIES
Discharge: HOME | End: 2021-11-30
Payer: COMMERCIAL

## 2021-11-30 PROCEDURE — G0424 PULMONARY REHAB W EXER: HCPCS

## 2021-12-07 ENCOUNTER — HOSPITAL ENCOUNTER (OUTPATIENT)
Dept: PULMONOLOGY | Age: 51
Setting detail: THERAPIES SERIES
Discharge: HOME | End: 2021-12-07
Payer: COMMERCIAL

## 2021-12-07 PROCEDURE — G0424 PULMONARY REHAB W EXER: HCPCS

## 2021-12-16 ENCOUNTER — HOSPITAL ENCOUNTER (OUTPATIENT)
Dept: PULMONOLOGY | Age: 51
Setting detail: THERAPIES SERIES
Discharge: HOME | End: 2021-12-16
Payer: COMMERCIAL

## 2021-12-16 PROCEDURE — G0424 PULMONARY REHAB W EXER: HCPCS

## 2021-12-16 NOTE — PROGRESS NOTES
[facility]  FAMILY MEDICINE RESIDENCY PROGRAM  DATE OF VISIT : 2021    Patient : Yimi Sloan   Age : 46 y.o.  : 1970   MRN : [id number]   ______________________________________________________________________    Chief Complaint :   Chief Complaint   Patient presents with    Other     covid f/u    Depression     going to loss home feels she needs to go back to work    Fatigue     hard to get up in the morning    Other     needs to talk to someone but can not afford it    Discuss Medications     can not afford her medication        HPI : Yimi Doctor is 46 y.o. female who presented to the clinic today for above chief complaint. Post COVID syndrome:  Patient has been dealing with Covid long-haul syndrome since Covid diagnoses in 2021.  Symptoms are overall stable, continues to deal with exertional dyspnea. Continues to use albuterol, last use 3:00 this morning.  Also continues to endorse fatigue.   Continuing to deal with depression anxiety surrounding situation, sleep and appetite and energy levels affected. Has not been able to see Dr. Elizabeth Augustine due to financial barriers and co-pay. Overall dealing with multiple financial barriers and problems secondary to above. Patient unable to afford all medications, also behind on rent and may lose housing. Has exhausted short-term disability and is in process of applying for long-term disability. 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     Hypertension:   Currently on chlorthalidone and metoprolol. Unable to pay for amlodipine. Blood pressure mildly elevated today. Patient denies any chest pain, shortness of breath, headache. Anemia:  Previously in setting of a UB which per patient has since resolved, periods are now regular with normal flow. Patient without recent Pap or colon cancer screening. She denies any other signs of of bleeding.   She does report eating ice every day. Past Medical History :  Past Medical History:   Diagnosis Date    Hypertension      No past surgical history on file. Review of Systems :    ROS - Per HPI   Review of Systems   Constitutional: Positive for fatigue. Negative for chills and fever. Respiratory: Positive for cough and shortness of breath. Negative for wheezing. Stridor: chronic dyspnea on exertion. Cardiovascular: Negative for chest pain, palpitations and leg swelling. Gastrointestinal: Negative for abdominal pain, nausea and vomiting. Neurological: Negative for dizziness, syncope and headaches. Psychiatric/Behavioral: Positive for decreased concentration, dysphoric mood and sleep disturbance. Negative for suicidal ideas. The patient is nervous/anxious. ______________________________________________________________________    Physical Exam :    Wt Readings from Last 3 Encounters:   12/17/21 203 lb (92.1 kg)   09/17/21 196 lb (88.9 kg)   09/14/21 193 lb (87.5 kg)       BMI Readings from Last 3 Encounters:   12/17/21 32.27 kg/m²   09/17/21 31.16 kg/m²   09/14/21 30.68 kg/m²   ]      Vitals: BP (!) 148/90 (Site: Right Upper Arm, Position: Sitting, Cuff Size: Medium Adult)   Pulse 139   Temp 97.3 °F (36.3 °C) (Temporal)   Resp 16   Wt 203 lb (92.1 kg)   SpO2 95%   BMI 32.27 kg/m²     Physical Exam  Constitutional:       Appearance: She is well-developed. Comments: Fatigued appearing   HENT:      Head: Normocephalic and atraumatic. Eyes:      Pupils: Pupils are equal, round, and reactive to light. Comments: Slight conjunctival pallor   Neck:      Thyroid: No thyromegaly. Trachea: No tracheal deviation. Cardiovascular:      Rate and Rhythm: Regular rhythm. Tachycardia present. Heart sounds: Normal heart sounds. No murmur heard. Pulmonary:      Effort: Pulmonary effort is normal. No respiratory distress. Breath sounds: Normal breath sounds. No wheezing or rales.    Abdominal: General: Bowel sounds are normal. There is no distension. Palpations: Abdomen is soft. Tenderness: There is no abdominal tenderness. Musculoskeletal:         General: Normal range of motion. Cervical back: Normal range of motion and neck supple. Lymphadenopathy:      Cervical: No cervical adenopathy. Skin:     General: Skin is warm and dry. Capillary Refill: Capillary refill takes less than 2 seconds. Findings: No rash. Neurological:      Mental Status: She is alert and oriented to person, place, and time. Cranial Nerves: No cranial nerve deficit. Deep Tendon Reflexes: Reflexes normal.   Psychiatric:         Behavior: Behavior normal.      Comments: Conversant, tearful         ______________________________________________________________________    Assessment & Plan :     Diagnosis Orders   1. Long COVID     2. Tachycardia  EKG 12 Lead    TSH    COMPREHENSIVE METABOLIC PANEL    CBC    CBC    COMPREHENSIVE METABOLIC PANEL    TSH   3. Anemia, unspecified type     4. Financial barrier to education  [facility] Referral to Social Work (Primary Care Only)   5. Colon cancer screening  Cologuard (For External Results Only)    Cologuard (For External Results Only)   6. Encounter for screening mammogram for breast cancer  EL DIGITAL SCREEN BILATERAL PER PROTOCOL       Long COVID: Continuing with pulmonary rehab. Continue with inhalers as afforded. Patient in process of applying for long-term disability, paperwork filled out and faxed. Social work referral for further assistance with [employer]. Sinus tachycardia: EKG reviewed sinus tach with slight ST depression in inferior lateral leads, no active cardiac complaints. Advised patient to maintain good hydration, limit albuterol usage. Will check labs as ordered. Close follow-up on symptoms, signs and symptoms warranting emergent evaluation reviewed with patient.   Consider referral to cardiology pending work-up. Hypertension: Mildly elevated today, asymptomatic. Continue current medications, adjust once disability is in place for patient with coverage. Consider stopping chlorthalidone and starting spironolactone pending potassium levels on blood work. Anemia: Previously in setting of AUB which has resolved per patient. Check blood counts and other labs as documented. Colon cancer screening ordered. Health maintenance: Labs as ordered, Cologuard ordered    Follow up:  Return in about 2 weeks (around 12/31/2021) for follow up on symptoms.       Hayden Steele MD PGY-3    Discussed with: Dr. Fela Agosto

## 2021-12-17 ENCOUNTER — OFFICE VISIT (OUTPATIENT)
Dept: FAMILY MEDICINE CLINIC | Age: 51
End: 2021-12-17
Payer: COMMERCIAL

## 2021-12-17 VITALS
WEIGHT: 203 LBS | RESPIRATION RATE: 16 BRPM | DIASTOLIC BLOOD PRESSURE: 90 MMHG | OXYGEN SATURATION: 95 % | HEART RATE: 139 BPM | BODY MASS INDEX: 32.27 KG/M2 | TEMPERATURE: 97.3 F | SYSTOLIC BLOOD PRESSURE: 148 MMHG

## 2021-12-17 DIAGNOSIS — Z12.11 COLON CANCER SCREENING: ICD-10-CM

## 2021-12-17 DIAGNOSIS — U09.9 LONG COVID: Primary | ICD-10-CM

## 2021-12-17 DIAGNOSIS — Z12.31 ENCOUNTER FOR SCREENING MAMMOGRAM FOR BREAST CANCER: ICD-10-CM

## 2021-12-17 DIAGNOSIS — Z59.9: ICD-10-CM

## 2021-12-17 DIAGNOSIS — I10 PRIMARY HYPERTENSION: ICD-10-CM

## 2021-12-17 DIAGNOSIS — D64.9 ANEMIA, UNSPECIFIED TYPE: ICD-10-CM

## 2021-12-17 DIAGNOSIS — R00.0 TACHYCARDIA: ICD-10-CM

## 2021-12-17 LAB
ALBUMIN SERPL-MCNC: 4 G/DL (ref 3.5–5.2)
ALP BLD-CCNC: 88 U/L (ref 35–104)
ALT SERPL-CCNC: 32 U/L (ref 0–32)
ANION GAP SERPL CALCULATED.3IONS-SCNC: 21 MMOL/L (ref 7–16)
AST SERPL-CCNC: 74 U/L (ref 0–31)
BILIRUB SERPL-MCNC: 0.3 MG/DL (ref 0–1.2)
BUN BLDV-MCNC: 4 MG/DL (ref 6–20)
CALCIUM SERPL-MCNC: 9.1 MG/DL (ref 8.6–10.2)
CHLORIDE BLD-SCNC: 96 MMOL/L (ref 98–107)
CO2: 21 MMOL/L (ref 22–29)
CREAT SERPL-MCNC: 0.6 MG/DL (ref 0.5–1)
GFR AFRICAN AMERICAN: >60
GFR NON-AFRICAN AMERICAN: >60 ML/MIN/1.73
GLUCOSE BLD-MCNC: 115 MG/DL (ref 74–99)
HCT VFR BLD CALC: 29 % (ref 34–48)
HEMOGLOBIN: 8.3 G/DL (ref 11.5–15.5)
MCH RBC QN AUTO: 18.3 PG (ref 26–35)
MCHC RBC AUTO-ENTMCNC: 28.6 % (ref 32–34.5)
MCV RBC AUTO: 64 FL (ref 80–99.9)
PDW BLD-RTO: 22 FL (ref 11.5–15)
PLATELET # BLD: 383 E9/L (ref 130–450)
PMV BLD AUTO: 9.1 FL (ref 7–12)
POTASSIUM SERPL-SCNC: 3.2 MMOL/L (ref 3.5–5)
RBC # BLD: 4.53 E12/L (ref 3.5–5.5)
SODIUM BLD-SCNC: 138 MMOL/L (ref 132–146)
TOTAL PROTEIN: 8.6 G/DL (ref 6.4–8.3)
TSH SERPL DL<=0.05 MIU/L-ACNC: 2.03 UIU/ML (ref 0.27–4.2)
WBC # BLD: 7.8 E9/L (ref 4.5–11.5)

## 2021-12-17 PROCEDURE — 36415 COLL VENOUS BLD VENIPUNCTURE: CPT | Performed by: FAMILY MEDICINE

## 2021-12-17 PROCEDURE — 99214 OFFICE O/P EST MOD 30 MIN: CPT | Performed by: FAMILY MEDICINE

## 2021-12-17 PROCEDURE — 99213 OFFICE O/P EST LOW 20 MIN: CPT | Performed by: FAMILY MEDICINE

## 2021-12-17 PROCEDURE — 93005 ELECTROCARDIOGRAM TRACING: CPT | Performed by: FAMILY MEDICINE

## 2021-12-17 PROCEDURE — 93010 ELECTROCARDIOGRAM REPORT: CPT | Performed by: FAMILY MEDICINE

## 2021-12-17 SDOH — ECONOMIC STABILITY - INCOME SECURITY: PROBLEM RELATED TO HOUSING AND ECONOMIC CIRCUMSTANCES, UNSPECIFIED: Z59.9

## 2021-12-17 ASSESSMENT — ENCOUNTER SYMPTOMS
ABDOMINAL PAIN: 0
SHORTNESS OF BREATH: 1
NAUSEA: 0
COUGH: 1
VOMITING: 0
WHEEZING: 0

## 2021-12-17 NOTE — PROGRESS NOTES
S: 46 y.o. female for follow up of COVID, long-term symptoms. Many stressors with finances. Feels exhausted. Anxious. Previously started on Zoloft. Stopped taking this after about one week. Cough and breathing about the same, symptoms are stable, no recent changes. Has been referred to many pulmonologists lately, unable to be seen yet. Had Scarlett in February. Decreased energy, chronic fatigue. Patient care associate previously. Has been tachycardic for several months. Has been going to pulm rehab. Previously anemic, had AUB, but periods have been more regular recently. Has not noted blood loss from other sites. Does eat ice \"all day, every day\". O: VS: BP (!) 148/90 (Site: Right Upper Arm, Position: Sitting, Cuff Size: Medium Adult)   Pulse 139   Temp 97.3 °F (36.3 °C) (Temporal)   Resp 16   Wt 203 lb (92.1 kg)   SpO2 95%   BMI 32.27 kg/m²    General: NAD, appears somewhat anxious. CV:  RRR, mildly tachy    Resp: coarse, decreased, equal, unlabored    Abd:  Soft, nontender   Ext:  No edema  Impression: Long COVID, fatigue, sinus tachycardia   Plan: Recheck CBC with diff, CMP, TSH, labs. Encouraged hydration. Close follow up. Advise on warning signs/symptoms for which to seek care sooner. Currently no CP or SOB. Plan to correct anemia, evaluate with Pap/Pelvic and colon screening, may start with FIT but will likely need colonoscopy. Close follow up 2 weeks or sooner prn. Attending Physician Statement  I have discussed the case, including pertinent history and exam findings with the resident. I also have seen the patient and performed key portions of the examination. I agree with the documented assessment and plan.

## 2021-12-19 ENCOUNTER — TELEPHONE (OUTPATIENT)
Dept: FAMILY MEDICINE CLINIC | Age: 51
End: 2021-12-19

## 2021-12-19 DIAGNOSIS — N93.9 ABNORMAL UTERINE BLEEDING: Primary | ICD-10-CM

## 2021-12-19 DIAGNOSIS — E87.6 HYPOKALEMIA: ICD-10-CM

## 2021-12-19 RX ORDER — SPIRONOLACTONE 25 MG/1
25 TABLET ORAL DAILY
Qty: 90 TABLET | Refills: 1 | Status: SHIPPED
Start: 2021-12-19 | End: 2022-04-26 | Stop reason: SDUPTHER

## 2021-12-19 NOTE — TELEPHONE ENCOUNTER
Called patient with recent labs. Hypokalemia likely as result of restarted thiazide diuretic for BP as in past. Hyperaldosteronism workup in past not meeting criteria for true diagnosis. Advised patient to stop thiazide and start spironolactone for now. Will have patient increase PO potassium supplement intake by 20 daily, total 60 daily for 4 days. Plan to have nurse visit next week for BP check and repeat BMP. Has anemia likely as result of DUB and is already iron supplementation. Will advise patient to either take with fruit juice or vitamin C supplementation. Needs pelvic exam in near future. To have office visit with us in next 2 weeks. Then plan to either have pelvic exam with GYN or in our office within next few weeks.     Electronically signed by Freya Ziegler MD on 12/19/21 at 11:07 AM EST

## 2021-12-20 ENCOUNTER — CARE COORDINATION (OUTPATIENT)
Dept: CARE COORDINATION | Age: 51
End: 2021-12-20

## 2021-12-20 DIAGNOSIS — D64.9 ANEMIA, UNSPECIFIED TYPE: Primary | ICD-10-CM

## 2021-12-22 ENCOUNTER — TELEPHONE (OUTPATIENT)
Dept: SURGERY | Age: 51
End: 2021-12-22

## 2021-12-22 ENCOUNTER — CARE COORDINATION (OUTPATIENT)
Dept: CARE COORDINATION | Age: 51
End: 2021-12-22

## 2021-12-22 NOTE — TELEPHONE ENCOUNTER
MA made first attempt to contact patient to schedule appointment based on referral by Dr India Granda for anemia consult. Patient did not answer so MA left  requesting return call to schedule w/ first available provider.       Electronically signed by Milton Donato on 12/22/21 at 2:03 PM EST

## 2021-12-22 NOTE — CARE COORDINATION
[facility] made call to pt to initiate SW referral, unable to reach, left message requesting call back to this [facility] with contact info provided.     Marina ADAIR, [place]   Care Coordinator  [phone]

## 2021-12-22 NOTE — TELEPHONE ENCOUNTER
Please reach out to patient to have her come in for lab draw for BMP today or tomorrow.   Please assist with scheduling with specialists if necessary as well thank you

## 2021-12-27 ENCOUNTER — TELEPHONE (OUTPATIENT)
Dept: SURGERY | Age: 51
End: 2021-12-27

## 2021-12-27 NOTE — TELEPHONE ENCOUNTER
MA made second attempt to contact patient to schedule appointment based on referral by Dr Kwasi Garrido. Patient did not answer and VM box full and unable to leave VM. MA to try back .     Electronically signed by Rich Bishop on 12/27/21 at 10:37 AM EST

## 2021-12-29 ENCOUNTER — TELEPHONE (OUTPATIENT)
Dept: SURGERY | Age: 51
End: 2021-12-29

## 2021-12-29 NOTE — TELEPHONE ENCOUNTER
MA made third attempt to contact patient. Line rang busy, unable to contact. Referral returned.      Electronically signed by Olive Kennedy on 12/29/21 at 9:32 AM EST

## 2021-12-30 ENCOUNTER — HOSPITAL ENCOUNTER (OUTPATIENT)
Dept: GENERAL RADIOLOGY | Age: 51
Discharge: HOME | End: 2022-01-01
Payer: COMMERCIAL

## 2021-12-30 DIAGNOSIS — Z12.31 VISIT FOR SCREENING MAMMOGRAM: ICD-10-CM

## 2021-12-30 PROCEDURE — 77063 BREAST TOMOSYNTHESIS BI: CPT

## 2022-01-03 ASSESSMENT — ENCOUNTER SYMPTOMS
WHEEZING: 0
COUGH: 1
VOMITING: 0
NAUSEA: 0
ABDOMINAL PAIN: 0
SHORTNESS OF BREATH: 1

## 2022-01-03 NOTE — PROGRESS NOTES
:    ROS - Per HPI   Review of Systems   Constitutional: Positive for fatigue. Negative for chills and fever. Respiratory: Positive for cough and shortness of breath. Negative for wheezing. Stridor: chronic dyspnea on exertion. Cardiovascular: Negative for chest pain, palpitations and leg swelling. Gastrointestinal: Negative for abdominal pain, nausea and vomiting. Neurological: Negative for dizziness, syncope and headaches. Psychiatric/Behavioral: Positive for decreased concentration, dysphoric mood and sleep disturbance. Negative for suicidal ideas. The patient is nervous/anxious. ______________________________________________________________________    Physical Exam :    Wt Readings from Last 3 Encounters:   01/04/22 201 lb (91.2 kg)   12/17/21 203 lb (92.1 kg)   09/17/21 196 lb (88.9 kg)       BMI Readings from Last 3 Encounters:   01/04/22 31.96 kg/m²   12/17/21 32.27 kg/m²   09/17/21 31.16 kg/m²   ]      Vitals: /77 (Site: Left Upper Arm, Position: Sitting, Cuff Size: Medium Adult)   Pulse 95   Temp 97.6 °F (36.4 °C) (Temporal)   Ht 5' 6.5\" (1.689 m)   Wt 201 lb (91.2 kg)   SpO2 99%   BMI 31.96 kg/m²     Physical Exam  Constitutional:       Appearance: She is well-developed. Comments: Fatigued appearing   HENT:      Head: Normocephalic and atraumatic. Eyes:      Pupils: Pupils are equal, round, and reactive to light. Comments: Slight conjunctival pallor   Neck:      Thyroid: No thyromegaly. Trachea: No tracheal deviation. Cardiovascular:      Rate and Rhythm: Normal rate and regular rhythm. Heart sounds: Normal heart sounds. No murmur heard. Pulmonary:      Effort: Pulmonary effort is normal. No respiratory distress. Breath sounds: Wheezing present. No rales. Abdominal:      General: Bowel sounds are normal. There is no distension. Palpations: Abdomen is soft. Tenderness: There is no abdominal tenderness.    Musculoskeletal: General: Normal range of motion. Cervical back: Normal range of motion and neck supple. Lymphadenopathy:      Cervical: No cervical adenopathy. Skin:     General: Skin is warm and dry. Capillary Refill: Capillary refill takes less than 2 seconds. Findings: No rash. Neurological:      Mental Status: She is alert and oriented to person, place, and time. Cranial Nerves: No cranial nerve deficit. Deep Tendon Reflexes: Reflexes normal.   Psychiatric:         Behavior: Behavior normal.         ______________________________________________________________________    Assessment & Plan :     Diagnosis Orders   1. Anemia, unspecified type  PERIPHERAL BLOOD SMEAR, PATH REVIEW    Miscellaneous Sendout 1   2. Long COVID     3. Depression, unspecified depression type  sertraline (ZOLOFT) 25 MG tablet   4. Hypokalemia     5. Essential hypertension         Anemia: Persistent since spring of this year, has not been taking iron consistently. Recheck CBC showed stable anemic hemoglobin with decreased MCV. Will work-up further with B12 and folate, iron studies, peripheral smear, electrophoresis. Patient given contact information for referrals to GYN and general surgery, expressed the importance of patient and her role in working this up and making appointments and keeping them. Long COVID: Continue inhalers as prescribed, continue pulmonary rehab  Depression: Patient agreeable to retrialing sertraline 25 mg daily, unable to afford therapy at this time. We will follow-up to assess response in symptoms 4 to 6 weeks. Hypokalemia: Likely secondary to chlorthalidone usage which has been stopped. Continue supplement, check BMP today. Health maintenance: Referrals for Pap and colonoscopy as previously ordered, encouraged patient to get her Covid vaccination    Follow up:  Return in about 6 weeks (around 2/15/2022) for follow up new medication.       Kayleigh Workman MD PGY-3    Discussed with: Dr. Karyn Paez

## 2022-01-04 ENCOUNTER — OFFICE VISIT (OUTPATIENT)
Dept: FAMILY MEDICINE CLINIC | Age: 52
End: 2022-01-04
Payer: COMMERCIAL

## 2022-01-04 ENCOUNTER — CARE COORDINATION (OUTPATIENT)
Dept: CARE COORDINATION | Age: 52
End: 2022-01-04

## 2022-01-04 VITALS
OXYGEN SATURATION: 99 % | HEART RATE: 95 BPM | HEIGHT: 67 IN | BODY MASS INDEX: 31.55 KG/M2 | SYSTOLIC BLOOD PRESSURE: 126 MMHG | TEMPERATURE: 97.6 F | DIASTOLIC BLOOD PRESSURE: 77 MMHG | WEIGHT: 201 LBS

## 2022-01-04 DIAGNOSIS — F32.A DEPRESSION, UNSPECIFIED DEPRESSION TYPE: ICD-10-CM

## 2022-01-04 DIAGNOSIS — D64.9 ANEMIA, UNSPECIFIED TYPE: Primary | ICD-10-CM

## 2022-01-04 DIAGNOSIS — U09.9 LONG COVID: ICD-10-CM

## 2022-01-04 DIAGNOSIS — I10 ESSENTIAL HYPERTENSION: ICD-10-CM

## 2022-01-04 DIAGNOSIS — E87.6 HYPOKALEMIA: ICD-10-CM

## 2022-01-04 PROCEDURE — 99212 OFFICE O/P EST SF 10 MIN: CPT | Performed by: FAMILY MEDICINE

## 2022-01-04 PROCEDURE — 99213 OFFICE O/P EST LOW 20 MIN: CPT | Performed by: FAMILY MEDICINE

## 2022-01-04 RX ORDER — SERTRALINE HYDROCHLORIDE 25 MG/1
25 TABLET, FILM COATED ORAL DAILY
Qty: 30 TABLET | Refills: 3 | Status: SHIPPED
Start: 2022-01-04 | End: 2022-09-01 | Stop reason: SDUPTHER

## 2022-01-04 NOTE — PROGRESS NOTES
Attending Physician Statement    S:   Chief Complaint   Patient presents with    Post-COVID Symptoms    [address] Persistent anemia. Normal menses, not taking her iron supplement. Depression/anxiety related to COVID- didn't consistently take zoloft  O: Blood pressure 126/77, pulse 95, temperature 97.6 °F (36.4 °C), temperature source Temporal, height 5' 6.5\" (1.689 m), weight 201 lb (91.2 kg), SpO2 99 %, not currently breastfeeding. Exam:   Heart - RRR   Lungs - scattered wheezing   Slight pallor  A: As above  P:  Repeat labs   Stressed importance of compliance with meds, including zoloft, iron, and symbicort   Peripheral smear, electrophoresis   Refer to surgery, GYN   Follow-up as ordered    I have discussed the case, including pertinent history and exam findings with the resident. I agree with the documented assessment and plan.

## 2022-01-04 NOTE — PATIENT INSTRUCTIONS
Take iron supplement every day  Use your symbicort as directed every day  Start taking [address], [place]   [facility], [address] [place] [address], [address] [place] [address], [address] [place] [address]   Fax: [phone]

## 2022-01-04 NOTE — CARE COORDINATION
[facility] made call to pt to initiate SW referral, unable to reach or leave message d/t voicemailbox being full. 3rd and final outreach attempt, [facility] to sign off. CC to route PCP.     Kristal ADAIR, [facility]   Care Coordinator  [phone]

## 2022-01-11 ENCOUNTER — TELEPHONE (OUTPATIENT)
Dept: FAMILY MEDICINE CLINIC | Age: 52
End: 2022-01-11

## 2022-01-11 NOTE — TELEPHONE ENCOUNTER
Sushma called in to let you know of the medication that she could not remember last week. She states it is called trulalance 3 mg.     Thank you

## 2022-01-17 PROCEDURE — 9900000058 HC PULMONARY REHAB PHASE 3

## 2022-01-25 ENCOUNTER — HOSPITAL ENCOUNTER (OUTPATIENT)
Dept: PULMONOLOGY | Age: 52
Discharge: HOME | End: 2022-01-25

## 2022-02-02 NOTE — LETTER
[facility]  [address] [facility] [address]  Phone: [phone]  Fax: [phone]    Roberto Gonzalez MD        May 4, 2021     Patient: Dimitri Ganser   YOB: 1970   Date of Visit: 5/4/2021       To Whom it May Concern:    Dimitri Ganser was seen in my clinic on 5/4/2021. She is to remain off of work for the month of May. She may return sooner if feels improved. If you have any questions or concerns, please don't hesitate to call.     Sincerely,         Roberto Gonzalez MD General:     NAD, well-nourished, well-appearing  Eyes: PERRL  Head:     NC/AT, EOMI, oral mucosa moist  Neck:     trachea midline  Lungs:     CTA b/l  CVS:     RRR  Abd:     +BS, s/nt/nd. no cva tenderness  Ext:   no deformities   Neuro: AAOx3, no sensory/motor deficits   : scant blood in vaginal vault

## 2022-02-17 ENCOUNTER — OFFICE VISIT (OUTPATIENT)
Dept: FAMILY MEDICINE CLINIC | Age: 52
End: 2022-02-17
Payer: COMMERCIAL

## 2022-02-17 VITALS
DIASTOLIC BLOOD PRESSURE: 81 MMHG | RESPIRATION RATE: 16 BRPM | OXYGEN SATURATION: 99 % | BODY MASS INDEX: 32.8 KG/M2 | TEMPERATURE: 100 F | HEIGHT: 67 IN | HEART RATE: 123 BPM | SYSTOLIC BLOOD PRESSURE: 178 MMHG | WEIGHT: 209 LBS

## 2022-02-17 DIAGNOSIS — Z59.9 FINANCIAL DIFFICULTY: ICD-10-CM

## 2022-02-17 DIAGNOSIS — D64.9 ANEMIA, UNSPECIFIED TYPE: ICD-10-CM

## 2022-02-17 DIAGNOSIS — U09.9 CHRONIC POST-COVID-19 SYNDROME: Primary | ICD-10-CM

## 2022-02-17 DIAGNOSIS — F32.A DEPRESSION, UNSPECIFIED DEPRESSION TYPE: ICD-10-CM

## 2022-02-17 DIAGNOSIS — I10 ESSENTIAL HYPERTENSION: ICD-10-CM

## 2022-02-17 DIAGNOSIS — R00.0 TACHYCARDIA: ICD-10-CM

## 2022-02-17 PROCEDURE — 99212 OFFICE O/P EST SF 10 MIN: CPT | Performed by: FAMILY MEDICINE

## 2022-02-17 PROCEDURE — 99213 OFFICE O/P EST LOW 20 MIN: CPT | Performed by: FAMILY MEDICINE

## 2022-02-17 SDOH — ECONOMIC STABILITY - INCOME SECURITY: PROBLEM RELATED TO HOUSING AND ECONOMIC CIRCUMSTANCES, UNSPECIFIED: Z59.9

## 2022-02-17 ASSESSMENT — ENCOUNTER SYMPTOMS
WHEEZING: 0
NAUSEA: 0
COUGH: 1
ABDOMINAL PAIN: 0
VOMITING: 0
SHORTNESS OF BREATH: 1

## 2022-02-17 NOTE — PROGRESS NOTES
[address] [facility]  FAMILY MEDICINE RESIDENCY PROGRAM  DATE OF VISIT : 2022    Patient : Dolly Bryan   Age : 46 y.o.  : 1970   MRN : [id number]   ______________________________________________________________________    Chief Complaint :   Chief Complaint   Patient presents with    Post-COVID Symptoms     f/u patients very upset today    Other     breathing off and coughing at night     Nausea       HPI : Dolly Bryan is 46 y.o. female who presented to the clinic today for above chief complaint. Post COVID syndrome:  Patient has been dealing with Covid long-haul syndrome since Covid diagnoses in 2021.  Symptoms are overall stable, continues to deal with exertional dyspnea. Has been using symbicort as prescribed. Also using albuterol multiple times per day. Has seen pulmonary, Dr. Carlee Main. Stopped pulm rehab due to cost.  Reports that headache is improving  Also continues to endorse fatigue and nausea.  Continuing to deal with depression anxiety surrounding situation, sleep and appetite and energy levels affected. Has not been able to see Dr. Cami Fowler due to financial barriers and co-pay. Was re-trialed on zoloft however stopped it due to not liking how she felt with it. Has been on multiple SSRIs in the past. No longer has job. Was referred to  for assistance with income and resources however did not follow up with them.     Hypertension:   Currently on amlodipine, metoprolol. Was prescribed spironolactone but has not picked up. Worried about cost. BP elevated today. No chest pain, dizziness. Anemia:  Previously in setting of AUB which per patient has since resolved, periods are now regular with normal flow. Patient without recent Pap or colon cancer screening. She denies any other signs of of bleeding. She reports that she is taking her iron daily. She was referred to GYN and general surgery for further evaluation and work-up and has appts next month. Previously laboratory work has not been completed. Past Medical History :  Past Medical History:   Diagnosis Date    Hypertension      No past surgical history on file. Review of Systems :    ROS - Per HPI   Review of Systems   Constitutional: Positive for fatigue. Negative for chills and fever. Respiratory: Positive for cough and shortness of breath. Negative for wheezing. Stridor: chronic dyspnea on exertion. Cardiovascular: Negative for chest pain, palpitations and leg swelling. Gastrointestinal: Negative for abdominal pain, nausea and vomiting. Neurological: Negative for dizziness, syncope and headaches. Psychiatric/Behavioral: Positive for decreased concentration, dysphoric mood and sleep disturbance. Negative for suicidal ideas. The patient is nervous/anxious. ______________________________________________________________________    Physical Exam :    Wt Readings from Last 3 Encounters:   02/17/22 209 lb (94.8 kg)   01/04/22 201 lb (91.2 kg)   12/17/21 203 lb (92.1 kg)       BMI Readings from Last 3 Encounters:   02/17/22 33.23 kg/m²   01/04/22 31.96 kg/m²   12/17/21 32.27 kg/m²   ]      Vitals: BP (!) 178/81 (Site: Right Upper Arm, Position: Sitting, Cuff Size: Medium Adult)   Pulse 123   Temp 100 °F (37.8 °C) (Temporal)   Resp 16   Ht 5' 6.5\" (1.689 m)   Wt 209 lb (94.8 kg)   SpO2 99%   BMI 33.23 kg/m²     Physical Exam  Constitutional:       Appearance: She is well-developed. Comments: Fatigued appearing   HENT:      Head: Normocephalic and atraumatic. Eyes:      Pupils: Pupils are equal, round, and reactive to light. Comments: Slight conjunctival pallor   Neck:      Thyroid: No thyromegaly. Trachea: No tracheal deviation. Cardiovascular:      Rate and Rhythm: Normal rate and regular rhythm. Heart sounds: Normal heart sounds. No murmur heard. Pulmonary:      Effort: Pulmonary effort is normal. No respiratory distress. Breath sounds:  No wheezing or rales. Abdominal:      General: Bowel sounds are normal. There is no distension. Palpations: Abdomen is soft. Tenderness: There is no abdominal tenderness. Musculoskeletal:         General: Normal range of motion. Cervical back: Normal range of motion and neck supple. Neurological:      Mental Status: She is alert and oriented to person, place, and time. Psychiatric:         Behavior: Behavior normal.         ______________________________________________________________________    Assessment & Plan :     Diagnosis Orders   1. Chronic post-COVID-19 syndrome     2. Financial difficulty  [facility] Referral to Social Work (Primary Care Only)   3. Anemia, unspecified type     4. Depression, unspecified depression type     5. Essential hypertension     6. Tachycardia       Post COVID: continue inhalers, follow with pulm. Advised to cut back on albuterol as likely contributing to tachycardia as well as persistent anemia. Anemia: ongoing, has not gotten labs drawn, advised pt the importance of completing this and working up further. Follow up with GYN and surgery,  Depression: exacerbated with post COVID and financial barriers, will refer again to SW to assist with financial stressors. Hypertension: uncontrolled, advised to  and start spironolactone, given information regarding goodrx  Tachycardia: likely mediated by frequent albuterol and ongoing anemia, advised to cut back on albuterol, follow as anemia is worked up and corrected  Health maintenance: Referrals for Pap and colonoscopy as previously ordered, encouraged patient to get her Covid vaccination    Follow up:  Return in about 2 weeks (around 3/3/2022) for 2 week nurse visit for BP, 6 weeks for follow up with PCP on symptoms .       Khang Zaldivar MD PGY-3    Discussed with: Dr. Eloy Waters

## 2022-02-17 NOTE — PATIENT INSTRUCTIONS
[employer] - prescription coupons  Decrease usage of albuterol  Get lab work completed  Follow up with specialists

## 2022-02-17 NOTE — PROGRESS NOTES
S: Fredi Alatorre 46 y.o. female  here for follow-up. Concerns include status post Covid infection with long Covid syndrome; hypertension; anemia; financial difficulties. Covid infection: Status post Covid infection 2/21. Since then she has experienced persistent dyspnea (prescribed Symbicort and albuterol; she uses albuterol multiple times a day); depression which was present pre-Covid but is now exacerbated and does not take medications as directed. She lost her job as result of the above maladies. Hypertension: Poorly controlled. Current prescribed regimen includes spironolactone, amlodipine, metoprolol tartrate. She never picked up the spironolactone so she has not been taking it as directed. No signs or symptoms of hypertension or hypotension. Anemia: Currently hemoglobin is 8.3. Patient reports that she is compliant with iron supplementation. She is just now getting in for appointments to GYN and GEN surge for work-up of colon cancer and GI bleeding. 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 O: VS: BP (!) 178/81 (Site: Right Upper Arm, Position: Sitting, Cuff Size: Medium Adult)   Pulse 123   Temp 100 °F (37.8 °C) (Temporal)   Resp 16   Ht 5' 6.5\" (1.689 m)   Wt 209 lb (94.8 kg)   SpO2 99%   BMI 33.23 kg/m²    General: NAD. Hypertensive and tachycardic. CV:  RRR, no gallops, rubs, or murmurs. Tachycardia   Resp: CTAB no R/R/W   Abd:  Soft, nontender, no masses    Ext:  no C/C/E              Psych: Tearful  Assessment / Plan:      Sushma was seen today for post-covid symptoms and other. Diagnoses and all orders for this visit:    1. Chronic post-COVID-19 syndrome      2. Financial difficulty  [facility] Referral to Social Work (Primary Care Only)   3. Anemia, unspecified type      4. Depression, unspecified depression type      5. Essential hypertension      6.  Tachycardia         Post COVID: continue inhalers, follow with pulm. Advised to cut back on albuterol as likely contributing to tachycardia as well as persistent anemia. Anemia: ongoing, has not gotten labs drawn, advised pt the importance of completing this and working up further. Follow up with GYN and surgery,  Depression: exacerbated with post COVID and financial barriers, will refer again to SW to assist with financial stressors. Hypertension: uncontrolled, advised to  and start spironolactone, given information regarding goodrx  Tachycardia: likely mediated by frequent albuterol and ongoing anemia, advised to cut back on albuterol, follow as anemia is worked up and corrected  Health maintenance: Referrals for Pap and colonoscopy as previously ordered, encouraged patient to get her Covid vaccination           Assessment/Plan:  1. Long COVID: Symptoms as above. Recommendation will be to decrease albuterol usage and continue to follow with pulmonary (Dr. Juan Ramon Breaux)  2. Hypertension: Poorly controlled. Plan of care includes continuing current medication;  and start spironolactone; decrease of albuterol as directed. 3. Anemia: Currently noncompliant with recommended evaluations. GYN, colon cancer screenings as recommended; repeat labs are needed. 4. Hypokalemia: Needs follow  5. Financial difficulties: Patient advised to connect with  for further resources. Return in about 2 weeks (around 3/3/2022) for 2 week nurse visit for BP, 6 weeks for follow up with PCP on symptoms . Follow-up 2 weeks for blood pressure check (nursing visit); follow-up in 4 to 6 weeks with PCP to review recommendations    Attending Physician Statement  I have discussed the case, including pertinent history and exam findings with the resident. I agree with the documented assessment and plan.          Kyrie López MD

## 2022-02-21 ENCOUNTER — CARE COORDINATION (OUTPATIENT)
Dept: CARE COORDINATION | Age: 52
End: 2022-02-21

## 2022-02-21 NOTE — CARE COORDINATION
Initial Contact Social Work Note - Ambulatory  2/21/2022      Date of referral: 2/18/22  Referral received from: Dr. Gabby Lama MD  Reason for referral: Financial issues    Previous SW referral: Yes  If yes, brief summary of outcome: 1st SW referral given resources for help with bills/rent/etc; 2nd SW referral was unable to be opened due to not reaching patient x3    Two Identifiers Verified: Yes    Insurance Provider: [employer]: Adult Child/Children, Sibling(s), Community Provider(sari for rent)  and Therapist(states she can't afford to go)    [employer] Status: NA    Community Providers: Local Behavioral Provider - Dr. Demetrice Robertson seen 9/20/21  - states it is $55 per visit and she can't afford that     ADL Assistance Needed: N/A    Housing/Living Concerns or Home Modification Needs: NA    Transportation Concern: NA - doesn't drive so she gets rides     Medication Cost Concern: NA    Medication Adherence Concern: was not compliant with taking medication for MH needs. States that she is taking it now    Financial Concern(s): Reports no job or income  Income (only if applicable): $0    Ability to Read/Write: Yes    Advance Care Plan:  N/A    Other: NA    Identified Needs:   Financial issues   Transportation issues       Social Work Plan:   Assess situation and what avenue of help can be given   Next Steps: Route to the associate CC team    Method of Communication With Provider (if appropriate): Chart Routing      Goals Addressed    None      [facility] called and spoke with [facility] today and completed assessment. [facility] states she does live alone in an apartment. She reports no help is needed for ADL/IADLs except for driving as she has a fear of driving. Processed using the [place] and how to request it when scheduling her appointments. Reviewed being unemployed since Feb. 5th 2021 due to having COVID.   Used her PTO time, was given STD and applied for LTD but hasn't been given a decision on it as of yet. STD ran out in June and [place] with [employer] was applied for but they keep requesting information that has been sent in several times. Validation given to [facility] for the frustration she has with getting the LTD approved. Processed the HELP and financial aide department with [address] [employer] for the medical bills that she is getting and can't afford. Processed [employer] with Sushma who reports that her utilities are all included in her apartment fee so [employer] wouldn't be able to assist her. Reviewed the Funding from employer that [facility] had used last year. Tahmina Simmons attempted to locate a number for a merged call but there was no number listed. Reviewed with [facility] that she is eligible to request the help again and should reach out to HR for that. Reviewed applying for SNAP/EBT/Food stamps on line as well as using 211 for local food [place].     Processed with Sushma that this Tahmina Simmons will route case back to the CC associate team for follow up

## 2022-02-22 ENCOUNTER — CARE COORDINATION (OUTPATIENT)
Dept: OTHER | Facility: CLINIC | Age: 52
End: 2022-02-22

## 2022-02-22 NOTE — CARE COORDINATION
TC to pt for follow up. No answer, left message. Will continue to attempt outreach.      MANA Gurrola, [facility]    Associate Care Management   [phone]

## 2022-02-23 ENCOUNTER — HOSPITAL ENCOUNTER (OUTPATIENT)
Age: 52
Discharge: HOME | End: 2022-02-23
Payer: COMMERCIAL

## 2022-02-23 ENCOUNTER — CARE COORDINATION (OUTPATIENT)
Dept: OTHER | Facility: CLINIC | Age: 52
End: 2022-02-23

## 2022-02-23 DIAGNOSIS — E87.6 HYPOKALEMIA: ICD-10-CM

## 2022-02-23 DIAGNOSIS — D64.9 ANEMIA, UNSPECIFIED TYPE: ICD-10-CM

## 2022-02-23 LAB
ANION GAP SERPL CALCULATED.3IONS-SCNC: 11 MMOL/L (ref 7–16)
BUN BLDV-MCNC: 7 MG/DL (ref 6–20)
CALCIUM SERPL-MCNC: 9.1 MG/DL (ref 8.6–10.2)
CHLORIDE BLD-SCNC: 95 MMOL/L (ref 98–107)
CO2: 28 MMOL/L (ref 22–29)
CREAT SERPL-MCNC: 0.7 MG/DL (ref 0.5–1)
FERRITIN: 22 NG/ML
FOLATE: 11.2 NG/ML (ref 4.8–24.2)
GFR AFRICAN AMERICAN: >60
GFR NON-AFRICAN AMERICAN: >60 ML/MIN/1.73
GLUCOSE BLD-MCNC: 118 MG/DL (ref 74–99)
IRON SATURATION: 66 % (ref 15–50)
IRON: 295 MCG/DL (ref 37–145)
PATHOLOGIST REVIEW: NORMAL
POTASSIUM SERPL-SCNC: 3.4 MMOL/L (ref 3.5–5)
SODIUM BLD-SCNC: 134 MMOL/L (ref 132–146)
TOTAL IRON BINDING CAPACITY: 449 MCG/DL (ref 250–450)
VITAMIN B-12: 451 PG/ML (ref 211–946)

## 2022-02-23 PROCEDURE — 83020 HEMOGLOBIN ELECTROPHORESIS: CPT

## 2022-02-23 PROCEDURE — 83540 ASSAY OF IRON: CPT

## 2022-02-23 PROCEDURE — 83550 IRON BINDING TEST: CPT

## 2022-02-23 PROCEDURE — 82607 VITAMIN B-12: CPT

## 2022-02-23 PROCEDURE — 83021 HEMOGLOBIN CHROMOTOGRAPHY: CPT

## 2022-02-23 PROCEDURE — 80048 BASIC METABOLIC PNL TOTAL CA: CPT

## 2022-02-23 PROCEDURE — 82746 ASSAY OF FOLIC ACID SERUM: CPT

## 2022-02-23 PROCEDURE — 81269 HBA1/HBA2 GENE DUP/DEL VRNTS: CPT

## 2022-02-23 PROCEDURE — 82728 ASSAY OF FERRITIN: CPT

## 2022-02-23 PROCEDURE — 36415 COLL VENOUS BLD VENIPUNCTURE: CPT

## 2022-02-23 NOTE — CARE COORDINATION
TC to pt for referral follow up. Pt states she spoke with Isaias Saravia and was told that someone would be following up to apply for [facility] hardship [facility]. Pt states she is having ongoing financial issues due to being off after zev COVID last year. Writer has worked with pt in the past and offered resources. Pt states she wants to apply for the fund again. States she was given $700 previously. Writer answered questions as best as possible related to fund's rules and limits. Pt states she has been calling HR with no success. Writer to email someone in [facility] [address] to ask for assistance and direct outreach to pt. Advised pt that she can also ask any questions that she may have as well. Pt also states she has issues paying copays but that some offices allow her to be billed and others want the payment at time of appt. Advised pt that this is an office preference and that if she's approved for the fund, this may be able to alleviate some of the hardship. Writer to Liu and will inform pt of outcome.      MANA Logan, [facility]    Associate Care Management   [phone]

## 2022-02-25 DIAGNOSIS — D56.9 THALASSEMIA, UNSPECIFIED TYPE: ICD-10-CM

## 2022-02-25 DIAGNOSIS — E87.6 HYPOKALEMIA: Primary | ICD-10-CM

## 2022-02-25 RX ORDER — POTASSIUM CHLORIDE 20 MEQ/1
40 TABLET, EXTENDED RELEASE ORAL DAILY
Qty: 30 TABLET | Refills: 1 | Status: SHIPPED
Start: 2022-02-25 | End: 2022-09-01 | Stop reason: SDUPTHER

## 2022-02-28 ENCOUNTER — TELEPHONE (OUTPATIENT)
Dept: FAMILY MEDICINE CLINIC | Age: 52
End: 2022-02-28

## 2022-02-28 NOTE — TELEPHONE ENCOUNTER
----- Message from Dc Martinez sent at 2/28/2022 12:10 PM EST -----  Subject: Message to Provider    QUESTIONS  Information for Provider? ed from [employer] would like a call back from   office in regards to a fax . .. ed can be reach at [phone] ref #[id number]  ---------------------------------------------------------------------------  --------------  [address]  What is the best way for the office to contact you? OK to leave message on   voicemail  Preferred Call Back Phone Number? [phone]  ---------------------------------------------------------------------------  --------------  SCRIPT ANSWERS  Relationship to Patient?  Third Party  Representative Name? ed from release [employer]

## 2022-03-08 ENCOUNTER — TELEPHONE (OUTPATIENT)
Dept: FAMILY MEDICINE CLINIC | Age: 52
End: 2022-03-08

## 2022-03-08 NOTE — TELEPHONE ENCOUNTER
She was referred to hematology/oncology for her anemia.  Please notify pt that she was sent there for further evaluation of her anemia thank you

## 2022-03-08 NOTE — TELEPHONE ENCOUNTER
----- Message from Mulugeta Ofeumu sent at 3/8/2022  9:21 AM EST -----  Subject: Message to Provider    QUESTIONS  Information for Provider? pt called and would like to speak to PCP or   nurse, she received a call 3/7/22 from a cancer specialist stating she   needs to make an appt w/ them and would not explain to her why, said they   could not discuss it with her and to contact her PCP. Please call pt ASAP   as she is very concerned  ---------------------------------------------------------------------------  --------------  CALL BACK INFO  What is the best way for the office to contact you? OK to leave message on   voicemail  Preferred Call Back Phone Number? [phone]  ---------------------------------------------------------------------------  --------------  SCRIPT ANSWERS  Relationship to Patient?  Self

## 2022-03-10 LAB
Lab: NORMAL
REPORT: NORMAL
THIS TEST SENT TO: NORMAL

## 2022-04-25 ENCOUNTER — TELEPHONE (OUTPATIENT)
Dept: SURGERY | Age: 52
End: 2022-04-25

## 2022-04-25 NOTE — TELEPHONE ENCOUNTER
Patient has no showed three times in the last year. Patient can call our office to reschedule when ready with the understanding that she should come her appointment when scheduled.   Electronically signed by Scout Dorantes MA on 4/25/22 at 9:36 AM EDT

## 2022-04-26 ENCOUNTER — OFFICE VISIT (OUTPATIENT)
Dept: FAMILY MEDICINE CLINIC | Age: 52
End: 2022-04-26
Payer: COMMERCIAL

## 2022-04-26 VITALS
BODY MASS INDEX: 33.12 KG/M2 | TEMPERATURE: 98.6 F | HEART RATE: 90 BPM | HEIGHT: 67 IN | DIASTOLIC BLOOD PRESSURE: 82 MMHG | SYSTOLIC BLOOD PRESSURE: 163 MMHG | OXYGEN SATURATION: 97 % | WEIGHT: 211 LBS | RESPIRATION RATE: 19 BRPM

## 2022-04-26 DIAGNOSIS — D56.9 THALASSEMIA, UNSPECIFIED TYPE: ICD-10-CM

## 2022-04-26 DIAGNOSIS — F32.A DEPRESSION, UNSPECIFIED DEPRESSION TYPE: ICD-10-CM

## 2022-04-26 DIAGNOSIS — E87.6 HYPOKALEMIA: ICD-10-CM

## 2022-04-26 DIAGNOSIS — D64.9 ANEMIA, UNSPECIFIED TYPE: ICD-10-CM

## 2022-04-26 DIAGNOSIS — I10 PRIMARY HYPERTENSION: ICD-10-CM

## 2022-04-26 DIAGNOSIS — U09.9 CHRONIC POST-COVID-19 SYNDROME: Primary | ICD-10-CM

## 2022-04-26 LAB
ANION GAP SERPL CALCULATED.3IONS-SCNC: 9 MMOL/L (ref 7–16)
BUN BLDV-MCNC: 4 MG/DL (ref 6–20)
CALCIUM SERPL-MCNC: 8.6 MG/DL (ref 8.6–10.2)
CHLORIDE BLD-SCNC: 99 MMOL/L (ref 98–107)
CO2: 25 MMOL/L (ref 22–29)
CREAT SERPL-MCNC: 0.6 MG/DL (ref 0.5–1)
GFR AFRICAN AMERICAN: >60
GFR NON-AFRICAN AMERICAN: >60 ML/MIN/1.73
GLUCOSE BLD-MCNC: 116 MG/DL (ref 74–99)
HCT VFR BLD CALC: 30.8 % (ref 34–48)
HEMOGLOBIN: 8.8 G/DL (ref 11.5–15.5)
MCH RBC QN AUTO: 19.9 PG (ref 26–35)
MCHC RBC AUTO-ENTMCNC: 28.6 % (ref 32–34.5)
MCV RBC AUTO: 69.7 FL (ref 80–99.9)
PDW BLD-RTO: 21.4 FL (ref 11.5–15)
PLATELET # BLD: 371 E9/L (ref 130–450)
PMV BLD AUTO: 9.5 FL (ref 7–12)
POTASSIUM SERPL-SCNC: 3.4 MMOL/L (ref 3.5–5)
RBC # BLD: 4.42 E12/L (ref 3.5–5.5)
SODIUM BLD-SCNC: 133 MMOL/L (ref 132–146)
WBC # BLD: 5.8 E9/L (ref 4.5–11.5)

## 2022-04-26 PROCEDURE — 99212 OFFICE O/P EST SF 10 MIN: CPT | Performed by: FAMILY MEDICINE

## 2022-04-26 PROCEDURE — 99213 OFFICE O/P EST LOW 20 MIN: CPT | Performed by: FAMILY MEDICINE

## 2022-04-26 RX ORDER — BUDESONIDE AND FORMOTEROL FUMARATE DIHYDRATE 160; 4.5 UG/1; UG/1
2 AEROSOL RESPIRATORY (INHALATION) 2 TIMES DAILY
Qty: 1 EACH | Refills: 3 | Status: SHIPPED
Start: 2022-04-26 | End: 2022-08-11 | Stop reason: SDUPTHER

## 2022-04-26 RX ORDER — SPIRONOLACTONE 25 MG/1
25 TABLET ORAL DAILY
Qty: 90 TABLET | Refills: 1 | Status: SHIPPED
Start: 2022-04-26 | End: 2022-09-01

## 2022-04-26 RX ORDER — ALBUTEROL SULFATE 90 UG/1
2 AEROSOL, METERED RESPIRATORY (INHALATION)
Qty: 1 EACH | Refills: 3 | Status: SHIPPED | OUTPATIENT
Start: 2022-04-26

## 2022-04-26 NOTE — PROGRESS NOTES
[address] RESIDENCY PROGRAM  DATE OF VISIT : 2022    Patient : Sheba Alonzo   Age : 46 y.o.  : 1970   MRN : [id number]   ______________________________________________________________________    Chief Complaint :   Chief Complaint   Patient presents with    1 Month Follow-Up       HPI : Sheba Alonzo is 46 y.o. female who presented to the clinic today for above chief complaint. Post COVID syndrome:  Patient has been dealing with Covid long-haul syndrome since Covid diagnoses in 2021.  Symptoms are overall stable, continues to deal with exertional dyspnea. Has been using symbicort as prescribed. Also using albuterol though less frequently. Has seen pulmonary, Dr. Siva Murray. Had PFTs done prior to this office visit. Due to follow-up with pulmonology in 2 weeks to review results. Stopped pulm rehab due to cost.  Reports that headache is stable though more frequent.  Also continues to endorse fatigue and nausea.  Continuing to deal with depression anxiety surrounding situation, sleep and appetite and energy levels affected. Has not been able to see Dr. Edwin Jewell due to financial barriers and co-pay. Has been taking half tablet of Zoloft. Has been on multiple SSRIs in the past. No longer has job. Was referred to  for assistance with income and resources however did not follow up with them.     Hypertension:   Currently on amlodipine, metoprolol, spironolactone. No recent BMP checked though has been ordered, has been on potassium supplement as well. BP elevated today. No chest pain, dizziness. Anemia:  Previously in setting of AUB which per patient has since resolved, periods are now regular with normal flow. Patient without recent Pap or colon cancer screening. She denies any other signs of of bleeding. She reports that she is taking her iron daily.   She was referred to GYN and general surgery for further evaluation and work-up and has no showed both appointments multiple times. Laboratory work-up indicating possible thalassemia, was referred to hematology, has not made appointment. Past Medical History :  Past Medical History:   Diagnosis Date    Hypertension     Long COVID      No past surgical history on file. Review of Systems :    ROS - Per HPI   ______________________________________________________________________    Physical Exam :    Wt Readings from Last 3 Encounters:   04/26/22 211 lb (95.7 kg)   02/17/22 209 lb (94.8 kg)   01/04/22 201 lb (91.2 kg)       BMI Readings from Last 3 Encounters:   04/26/22 33.55 kg/m²   02/17/22 33.23 kg/m²   01/04/22 31.96 kg/m²   ]      Vitals: BP (!) 163/82 (Site: Left Upper Arm, Position: Sitting, Cuff Size: Large Adult)   Pulse 90   Temp 98.6 °F (37 °C) (Temporal)   Resp 19   Ht 5' 6.5\" (1.689 m)   Wt 211 lb (95.7 kg)   SpO2 97%   BMI 33.55 kg/m²     Physical Exam  Constitutional:       Appearance: She is well-developed. Comments: Fatigued appearing   HENT:      Head: Normocephalic and atraumatic. Eyes:      Pupils: Pupils are equal, round, and reactive to light. Comments: Slight conjunctival pallor   Neck:      Thyroid: No thyromegaly. Trachea: No tracheal deviation. Cardiovascular:      Rate and Rhythm: Normal rate and regular rhythm. Heart sounds: Normal heart sounds. No murmur heard. Pulmonary:      Effort: Pulmonary effort is normal. No respiratory distress. Breath sounds: No wheezing or rales. Abdominal:      General: Bowel sounds are normal. There is no distension. Palpations: Abdomen is soft. Tenderness: There is no abdominal tenderness. Musculoskeletal:         General: Normal range of motion. Cervical back: Normal range of motion and neck supple. Neurological:      Mental Status: She is alert and oriented to person, place, and time.    Psychiatric:         Behavior: Behavior normal. ______________________________________________________________________    Assessment & Plan :     Diagnosis Orders   1. Chronic post-COVID-19 syndrome  budesonide-formoterol (SYMBICORT) 160-4.5 MCG/ACT AERO    albuterol sulfate HFA (VENTOLIN HFA) 108 (90 Base) MCG/ACT inhaler   2. Thalassemia, unspecified type  CBC    CBC   3. Anemia, unspecified type  CBC    CBC   4. Primary hypertension  spironolactone (ALDACTONE) 25 MG tablet    Basic Metabolic Panel    Basic Metabolic Panel   5. Hypokalemia     6. Depression, unspecified depression type       Post COVID Symptoms at Baseline, Continue with Pulmonology Reviewed PFT Results, Continue to Use, again judicious use of albuterol  Hypertension: Uncontrolled, did not take her meds, encouraged to take blood pressure pills when she gets home, follow-up nurse visit in few days to discuss blood pressure control  Anemia: Possibly due to thalassemia, awaiting hematology evaluation, awaiting evaluation by general surgery and GYN for age-appropriate cancer screenings  Depression: Has been taking half tablet of Zoloft, instructed to take full tablet  Health maintenance: Referrals for Pap and colonoscopy as previously ordered, encouraged patient to get her Covid vaccination    Follow up:  Return in about 8 weeks (around 6/21/2022) for follow up on symptoms.       Keke Marquez MD PGY-3    Discussed with: Dr. Taz Torre

## 2022-04-26 NOTE — PROGRESS NOTES
59-year-old female with history of hypertension, depression, post COVID syndrome here for follow-up. Continues to have ongoing symptoms from post COVID, exertional shortness of breath, headaches. She is under the care of pulmonology, did have PFTs done and is waiting to meet with pulmonologist to review results. Continues to be on Symbicort and albuterol as needed for shortness of breath. Blood pressure suboptimally controlled at this visit, reports that she did not take her meds this morning as she was rushing to have PFTs done and forgot to take them. Has not had potassium level rechecked as previously ordered, remains on potassium supplement and spironolactone. Has missed appointments with OB/GYN and general surgeon to further work-up anemia. Also has not scheduled with hematology regarding possible thalassemia based on anemia work-up labs. She denies any abnormal vaginal bleeding, any blood in her stool. Blood pressure (!) 163/82, pulse 90, temperature 98.6 °F (37 °C), temperature source Temporal, resp. rate 19, height 5' 6.5\" (1.689 m), weight 211 lb (95.7 kg), SpO2 97 %, not currently breastfeeding.     HEENT pale conjunctivae     heart regular    Lungs clear    abd non-tender      No edema    Pulses intact     Assessment and plan  Post COVID Symptoms at Baseline, Continue with Pulmonology Reviewed PFT Results, Continue to Use, again judicious use of albuterol  Hypertension: Uncontrolled, did not take her meds, encouraged to take blood pressure pills when she gets home, follow-up nurse visit in few days to discuss blood pressure control  Anemia: Possibly due to thalassemia, awaiting hematology evaluation, awaiting evaluation by general surgery and GYN for age-appropriate cancer screenings  Depression: Has been taking half tablet of Zoloft, instructed to take full tablet    Return to clinic in 6 weeks for follow-up    Attending Physician Statement  I have discussed the case, including pertinent history and exam findings with the resident. I agree with the documented assessment and plan.

## 2022-05-03 ENCOUNTER — HOSPITAL ENCOUNTER (EMERGENCY)
Age: 52
Discharge: HOME | End: 2022-05-03
Attending: EMERGENCY MEDICINE
Payer: COMMERCIAL

## 2022-05-03 ENCOUNTER — APPOINTMENT (OUTPATIENT)
Dept: CT IMAGING | Age: 52
End: 2022-05-03
Payer: COMMERCIAL

## 2022-05-03 ENCOUNTER — APPOINTMENT (OUTPATIENT)
Dept: GENERAL RADIOLOGY | Age: 52
End: 2022-05-03
Payer: COMMERCIAL

## 2022-05-03 VITALS
HEART RATE: 68 BPM | HEIGHT: 66 IN | OXYGEN SATURATION: 99 % | TEMPERATURE: 98 F | BODY MASS INDEX: 32.14 KG/M2 | WEIGHT: 200 LBS | RESPIRATION RATE: 16 BRPM | SYSTOLIC BLOOD PRESSURE: 110 MMHG | DIASTOLIC BLOOD PRESSURE: 70 MMHG

## 2022-05-03 DIAGNOSIS — R55 SYNCOPE AND COLLAPSE: Primary | ICD-10-CM

## 2022-05-03 DIAGNOSIS — E87.6 HYPOKALEMIA: ICD-10-CM

## 2022-05-03 LAB
ACANTHOCYTES: ABNORMAL
ALBUMIN SERPL-MCNC: 3.5 G/DL (ref 3.5–5.2)
ALP BLD-CCNC: 95 U/L (ref 35–104)
ALT SERPL-CCNC: 36 U/L (ref 0–32)
ANION GAP SERPL CALCULATED.3IONS-SCNC: 14 MMOL/L (ref 7–16)
ANISOCYTOSIS: ABNORMAL
AST SERPL-CCNC: 90 U/L (ref 0–31)
BASOPHILS ABSOLUTE: 0.08 E9/L (ref 0–0.2)
BASOPHILS RELATIVE PERCENT: 1.1 % (ref 0–2)
BILIRUB SERPL-MCNC: 0.3 MG/DL (ref 0–1.2)
BUN BLDV-MCNC: 6 MG/DL (ref 6–20)
BURR CELLS: ABNORMAL
CALCIUM SERPL-MCNC: 8.6 MG/DL (ref 8.6–10.2)
CHLORIDE BLD-SCNC: 94 MMOL/L (ref 98–107)
CHP ED QC CHECK: NORMAL
CO2: 24 MMOL/L (ref 22–29)
CREAT SERPL-MCNC: 0.7 MG/DL (ref 0.5–1)
EKG ATRIAL RATE: 68 BPM
EKG P AXIS: 34 DEGREES
EKG P-R INTERVAL: 160 MS
EKG Q-T INTERVAL: 436 MS
EKG QRS DURATION: 74 MS
EKG QTC CALCULATION (BAZETT): 463 MS
EKG R AXIS: -5 DEGREES
EKG T AXIS: -175 DEGREES
EKG VENTRICULAR RATE: 68 BPM
EOSINOPHILS ABSOLUTE: 0.33 E9/L (ref 0.05–0.5)
EOSINOPHILS RELATIVE PERCENT: 4.6 % (ref 0–6)
GFR AFRICAN AMERICAN: >60
GFR NON-AFRICAN AMERICAN: >60 ML/MIN/1.73
GLUCOSE BLD-MCNC: 133 MG/DL (ref 74–99)
GLUCOSE BLD-MCNC: 149 MG/DL
HCT VFR BLD CALC: 31.8 % (ref 34–48)
HEMOGLOBIN: 9.4 G/DL (ref 11.5–15.5)
HYPOCHROMIA: ABNORMAL
IMMATURE GRANULOCYTES #: 0.05 E9/L
IMMATURE GRANULOCYTES %: 0.7 % (ref 0–5)
LYMPHOCYTES ABSOLUTE: 2.01 E9/L (ref 1.5–4)
LYMPHOCYTES RELATIVE PERCENT: 28.3 % (ref 20–42)
MAGNESIUM: 1.8 MG/DL (ref 1.6–2.6)
MCH RBC QN AUTO: 19.7 PG (ref 26–35)
MCHC RBC AUTO-ENTMCNC: 29.6 % (ref 32–34.5)
MCV RBC AUTO: 66.8 FL (ref 80–99.9)
METER GLUCOSE: 149 MG/DL (ref 74–99)
MONOCYTES ABSOLUTE: 1.15 E9/L (ref 0.1–0.95)
MONOCYTES RELATIVE PERCENT: 16.2 % (ref 2–12)
NEUTROPHILS ABSOLUTE: 3.48 E9/L (ref 1.8–7.3)
NEUTROPHILS RELATIVE PERCENT: 49.1 % (ref 43–80)
OVALOCYTES: ABNORMAL
PDW BLD-RTO: 20.7 FL (ref 11.5–15)
PLATELET # BLD: 388 E9/L (ref 130–450)
PMV BLD AUTO: 9.6 FL (ref 7–12)
POIKILOCYTES: ABNORMAL
POLYCHROMASIA: ABNORMAL
POTASSIUM SERPL-SCNC: 2.8 MMOL/L (ref 3.5–5)
RBC # BLD: 4.76 E12/L (ref 3.5–5.5)
ROULEAUX: ABNORMAL
SCHISTOCYTES: ABNORMAL
SODIUM BLD-SCNC: 132 MMOL/L (ref 132–146)
TARGET CELLS: ABNORMAL
TEAR DROP CELLS: ABNORMAL
TOTAL PROTEIN: 7.5 G/DL (ref 6.4–8.3)
TROPONIN, HIGH SENSITIVITY: 6 NG/L (ref 0–9)
TROPONIN, HIGH SENSITIVITY: <6 NG/L (ref 0–9)
WBC # BLD: 7.1 E9/L (ref 4.5–11.5)

## 2022-05-03 PROCEDURE — 6370000000 HC RX 637 (ALT 250 FOR IP): Performed by: EMERGENCY MEDICINE

## 2022-05-03 PROCEDURE — 71045 X-RAY EXAM CHEST 1 VIEW: CPT

## 2022-05-03 PROCEDURE — 99285 EMERGENCY DEPT VISIT HI MDM: CPT

## 2022-05-03 PROCEDURE — 84484 ASSAY OF TROPONIN QUANT: CPT

## 2022-05-03 PROCEDURE — 85025 COMPLETE CBC W/AUTO DIFF WBC: CPT

## 2022-05-03 PROCEDURE — 70450 CT HEAD/BRAIN W/O DYE: CPT

## 2022-05-03 PROCEDURE — 93010 ELECTROCARDIOGRAM REPORT: CPT | Performed by: INTERNAL MEDICINE

## 2022-05-03 PROCEDURE — 83735 ASSAY OF MAGNESIUM: CPT

## 2022-05-03 PROCEDURE — 2580000003 HC RX 258: Performed by: STUDENT IN AN ORGANIZED HEALTH CARE EDUCATION/TRAINING PROGRAM

## 2022-05-03 PROCEDURE — 93005 ELECTROCARDIOGRAM TRACING: CPT | Performed by: STUDENT IN AN ORGANIZED HEALTH CARE EDUCATION/TRAINING PROGRAM

## 2022-05-03 PROCEDURE — 82962 GLUCOSE BLOOD TEST: CPT

## 2022-05-03 PROCEDURE — 80053 COMPREHEN METABOLIC PANEL: CPT

## 2022-05-03 RX ORDER — POTASSIUM CHLORIDE 7.45 MG/ML
10 INJECTION INTRAVENOUS ONCE
Status: DISCONTINUED | OUTPATIENT
Start: 2022-05-03 | End: 2022-05-03 | Stop reason: HOSPADM

## 2022-05-03 RX ORDER — POTASSIUM CHLORIDE 20 MEQ/1
40 TABLET, EXTENDED RELEASE ORAL ONCE
Status: COMPLETED | OUTPATIENT
Start: 2022-05-03 | End: 2022-05-03

## 2022-05-03 RX ORDER — 0.9 % SODIUM CHLORIDE 0.9 %
1000 INTRAVENOUS SOLUTION INTRAVENOUS ONCE
Status: COMPLETED | OUTPATIENT
Start: 2022-05-03 | End: 2022-05-03

## 2022-05-03 RX ADMIN — SODIUM CHLORIDE 1000 ML: 9 INJECTION, SOLUTION INTRAVENOUS at 01:56

## 2022-05-03 RX ADMIN — POTASSIUM CHLORIDE 40 MEQ: 20 TABLET, EXTENDED RELEASE ORAL at 04:45

## 2022-05-03 ASSESSMENT — PAIN - FUNCTIONAL ASSESSMENT: PAIN_FUNCTIONAL_ASSESSMENT: NONE - DENIES PAIN

## 2022-05-03 NOTE — ED PROVIDER NOTES
Department of Emergency Medicine   ED Provider Note  Admit Date/RoomTime: 5/3/2022  1:15 AM  ED Room: 19/19          History of Present Illness:  5/3/22, Time: 1:26 AM EDT         Jaye Pritchard is a 46 y.o. female with history of long COVID with persistent cough presenting to the ED for syncope, beginning just prior to arrival.  The complaint has been intermittent, moderate in severity, and worsened by standing up. Patient states that she was coughing in bed, got up to turn off the fan and after she stood up she felt lightheaded and does not remember anything after that. Her  woke her up on the ground. She then stood up, possibly had another episode of syncope. Patient does not know whether she had a head strike, does not have any neck pain or headache or vision changes or numbness or weakness or tingling. She denies any preceding chest pain or shortness of breath, has none currently as well. She denies any cardiac history, denies any family history of early cardiac death. She denies any alcohol or drug use. She denies any prior history of syncope. She states she has not been eating and drinking well secondary to decreased appetite and intermittent nausea secondary to her long COVID symptoms. She denies any abdominal pain or nausea at this time. Review of Systems:     Pertinent positives and negatives are stated within HPI. 10 point ROS otherwise negative.    --------------------------------------------- PAST HISTORY ---------------------------------------------  Past Medical History:  has a past medical history of Hypertension and Long COVID. Past Surgical History:  has no past surgical history on file. Social History:  reports that she has quit smoking. Her smoking use included cigarettes. She has a 2.50 pack-year smoking history. She has never used smokeless tobacco. She reports current alcohol use. She reports that she does not use drugs.     Family History: family history includes Stroke in her father. The patients home medications have been reviewed. Allergies: Penicillins        ---------------------------------------------------PHYSICAL EXAM--------------------------------------    Constitutional/General: AAO to person/place/time/purpose, NAD, no labored breathing  Head: Normocephalic and atraumatic  Eyes: EOMI, conjunctiva normal, sclera non icteric  Mouth: Moist mucous membranes, uvula midline  Neck: Supple, no stridor, no meningeal signs  Respiratory: Lungs clear to auscultation bilaterally, no wheezes, rales, or rhonchi. Not in respiratory distress  Cardiovascular:  Regular rate. Regular rhythm. No murmurs, no gallops, or rubs. 2+ distal pulses. Equal extremity pulses. Chest: No chest wall tenderness or deformity  GI:  Abdomen Soft, Non tender, Non distended. No rebound, guarding, or rigidity. No pulsatile masses. Musculoskeletal: Moves all extremities x 4. Warm and well perfused, no clubbing, cyanosis, or edema. Capillary refill <3 seconds  Integument: skin warm and dry. No rashes. Neurologic: GCS 15, no focal deficits, symmetric strength 5/5 in the major muscle groups of upper and lower extremities bilaterally  Psychiatric: Normal Affect      -----------------------------------------PROCEDURES----------------------------------------------------      -------------------------------------------------- RESULTS -------------------------------------------------  I have personally reviewed all laboratory and imaging results for this patient. Results are listed below.      LABS:  Results for orders placed or performed during the hospital encounter of 05/03/22   CBC with Auto Differential   Result Value Ref Range    WBC 7.1 4.5 - 11.5 E9/L    RBC 4.76 3.50 - 5.50 E12/L    Hemoglobin 9.4 (L) 11.5 - 15.5 g/dL    Hematocrit 31.8 (L) 34.0 - 48.0 %    MCV 66.8 (L) 80.0 - 99.9 fL    MCH 19.7 (L) 26.0 - 35.0 pg    MCHC 29.6 (L) 32.0 - 34.5 %    RDW 20.7 (H) 11.5 - 15.0 fL Platelets 427 474 - 050 E9/L    MPV 9.6 7.0 - 12.0 fL    Neutrophils % 49.1 43.0 - 80.0 %    Immature Granulocytes % 0.7 0.0 - 5.0 %    Lymphocytes % 28.3 20.0 - 42.0 %    Monocytes % 16.2 (H) 2.0 - 12.0 %    Eosinophils % 4.6 0.0 - 6.0 %    Basophils % 1.1 0.0 - 2.0 %    Neutrophils Absolute 3.48 1.80 - 7.30 E9/L    Immature Granulocytes # 0.05 E9/L    Lymphocytes Absolute 2.01 1.50 - 4.00 E9/L    Monocytes Absolute 1.15 (H) 0.10 - 0.95 E9/L    Eosinophils Absolute 0.33 0.05 - 0.50 E9/L    Basophils Absolute 0.08 0.00 - 0.20 E9/L    Anisocytosis 2+     Polychromasia 3+     Hypochromia 3+     Poikilocytes 3+     Schistocytes 1+     Acanthocytes 1+     Elizabeth Cells 1+     Ovalocytes 1+     Target Cells 3+     Tear Drop Cells 1+     Rouleaux 2+    Comprehensive Metabolic Panel   Result Value Ref Range    Sodium 132 132 - 146 mmol/L    Potassium 2.8 (L) 3.5 - 5.0 mmol/L    Chloride 94 (L) 98 - 107 mmol/L    CO2 24 22 - 29 mmol/L    Anion Gap 14 7 - 16 mmol/L    Glucose 133 (H) 74 - 99 mg/dL    BUN 6 6 - 20 mg/dL    CREATININE 0.7 0.5 - 1.0 mg/dL    GFR Non-African American >60 >=60 mL/min/1.73    GFR African American >60     Calcium 8.6 8.6 - 10.2 mg/dL    Total Protein 7.5 6.4 - 8.3 g/dL    Albumin 3.5 3.5 - 5.2 g/dL    Total Bilirubin 0.3 0.0 - 1.2 mg/dL    Alkaline Phosphatase 95 35 - 104 U/L    ALT 36 (H) 0 - 32 U/L    AST 90 (H) 0 - 31 U/L   Magnesium   Result Value Ref Range    Magnesium 1.8 1.6 - 2.6 mg/dL   Troponin   Result Value Ref Range    Troponin, High Sensitivity 6 0 - 9 ng/L   Troponin   Result Value Ref Range    Troponin, High Sensitivity <6 0 - 9 ng/L   POCT Glucose   Result Value Ref Range    Glucose 149 mg/dL    QC OK? pass    POCT Glucose   Result Value Ref Range    Meter Glucose 149 (H) 74 - 99 mg/dL   EKG 12 Lead   Result Value Ref Range    Ventricular Rate 68 BPM    Atrial Rate 68 BPM    P-R Interval 160 ms    QRS Duration 74 ms    Q-T Interval 436 ms    QTc Calculation (Bazett) 463 ms    P Axis 34 degrees    R Axis -5 degrees    T Axis -175 degrees       RADIOLOGY:  Interpreted by Radiologist unless otherwise specified  CT Head WO Contrast   Final Result   No acute findings. XR CHEST PORTABLE   Final Result   No acute findings. EKG:    See ED Course for EKG documentation        ------------------------- NURSING NOTES AND VITALS REVIEWED ---------------------------   The nursing notes within the ED encounter and vital signs as below have been reviewed by myself. /70   Pulse 68   Temp 98 °F (36.7 °C) (Oral)   Resp 16   Ht 5' 6\" (1.676 m)   Wt 200 lb (90.7 kg)   SpO2 99%   BMI 32.28 kg/m²   Oxygen Saturation Interpretation: Normal    The patients available past medical records and past encounters were reviewed. ------------------------------ ED COURSE/MEDICAL DECISION MAKING----------------------  Medications   0.9 % sodium chloride bolus (0 mLs IntraVENous Stopped 5/3/22 9515)   potassium chloride (KLOR-CON M) extended release tablet 40 mEq (40 mEq Oral Given 5/3/22 1185)            Medical Decision Making:     Is a 59-year-old female presenting to the emergency department for syncope. Patient has had decreased p.o. intake, decreased appetite over the past month. Patient had syncope with unclear head strike. CT of the head was obtained with no acute intracranial process. Patient here normotensive, afebrile, not tachycardic, well-appearing. Patient with no chest pain or shortness of breath, no concerning family history. Patient with no cardiac history. EKG with no acute ischemic changes, QTC mildly prolonged but not greater than 480 ms. Patient with history of chronic hypokalemia, normally on outpatient oral potassium has not been taking it. Potassium here low at 2.8, given oral repletion. See ED course for further details, patient will resume taking oral potassium repletion at home.   Telemetry interpreted, no arrhythmias on telemetry monitoring throughout ED stay. Patient was given IV fluids, felt significantly improved. Patient with no lightheadedness here in the emergency department, orthostatic vital signs negative. Troponins negative x2. Patient low risk [place] syncope score, -2 points. Given patient's improved symptoms, low risk, patient be discharged home with outpatient follow-up. Discussed with patient and  strict return cautions including any recurrent symptoms. See ED COURSE for additional MDM. Re-Evaluations:             ED Course as of 05/03/22 2130 Tue May 03, 2022   0139 EKG: This EKG is signed and interpreted by me. Rate: 68  Rhythm: Sinus  Interpretation: non-specific EKG and prolonged QT interval  Comparison: stable as compared to patient's most recent EKG, except Qt prolonged at 463 ms   [RH]   0429 Patient does not want the IV potassium. Patient states that she has oral potassium at home but has not been taking it. Patient will be given oral potassium here, resume her oral potassium at home. [RH]      ED Course User Index  [RH] [address], DO         This patient's ED course included: a personal history and physicial examination, re-evaluation prior to disposition, multiple bedside re-evaluations, IV medications, cardiac monitoring and continuous pulse oximetry    This patient has remained hemodynamically stable during their ED course. Consultations:  See ED Course    Counseling: The emergency provider has spoken with the patient and spouse/SO and discussed todays results, in addition to providing specific details for the plan of care and counseling regarding the diagnosis and prognosis. Questions are answered at this time and they are agreeable with the plan.       --------------------------------- IMPRESSION AND DISPOSITION ---------------------------------    IMPRESSION  1. Syncope and collapse    2.  Hypokalemia        DISPOSITION  Disposition: Discharge to home  Patient condition is stable      NOTE: This report was transcribed using voice recognition software. Every effort was made to ensure accuracy; however, inadvertent computerized transcription errors may be present. Also please note that patient was seen and examined by attending physician. Plan of care and disposition discussed with attending physician and they were immediately available or present for all procedures performed.        -- Cinthya Cool D.O. PGY-2     Resident Physician     Emergency Medicine      5/3/2022 9:30 PM        [address] DO  Resident  05/03/22 6029

## 2022-05-04 ENCOUNTER — CARE COORDINATION (OUTPATIENT)
Dept: OTHER | Facility: CLINIC | Age: 52
End: 2022-05-04

## 2022-05-04 NOTE — CARE COORDINATION
[facility] [facility] ED Follow Up Call    2022    Patient: Jaye Early Patient : 1970   MRN: [id number]  Reason for Admission: Syncope and collapse  Discharge Date: 5/3/22        Care Transitions ED Follow Up    Care Transitions Interventions  Do you have all of your prescriptions and are they filled?: Yes         Patient Home Equipment: [id number] Kelvin Roman ([facility]) attempted to contact the patient by telephone to perform post ED visit assessment. Left HIPPA compliant message providing introduction to self and requested a call back. Will continue to outreach to patient.

## 2022-05-06 NOTE — RESULT ENCOUNTER NOTE
Attempted to call patient again , her mail box is full, unable to leave message. Called emergency contact number , let message asking them to have patient to please call us for results.

## 2022-05-10 NOTE — RESULT ENCOUNTER NOTE
After several attempts to call patient and emergency contact post card sent requesting a working number and to scheduled office visit.

## 2022-05-13 ENCOUNTER — CARE COORDINATION (OUTPATIENT)
Dept: OTHER | Facility: CLINIC | Age: 52
End: 2022-05-13

## 2022-05-13 NOTE — LETTER
May 13, 2022    [address] [facility] [address] [facility]      Dear Cira Almonte    My name is Maurice Paz RN, Associate Care Manager for [address] and I have been trying to reach you. Associate Care Management (ACM)  is a free-of-charge confidential service provided to our employees and their family members covered by the [facility]. The program will provide an associate and his/her family with the [address]'s expertise to assist in navigating the health care delivery system, provider services, and their overall care needsso as to assure and improve health care interactions and enhance the quality of life. This service is designed to provide you with the opportunity to have a [employer] partner with you for the following services:    [address] [facility] when you come home from the hospital or emergency room    Providing education and connecting you with services and resources to help you to manage your diseases   Assisting you coordinating services or appointments to support your health care needs   Providing additional education, resources, or assistance in reaching your Be Well Health Program goals/ requirements (such as Be Well with Diabetes)       We can work together using your preferred communication -- telephone, mail, or MyChart. [address] [place] [address],[address] [place] [address] is dedicated to empowering the good health of its community and improving the quality of care and care experiences for employees and their families. We are committed to safeguarding patient confidentiality and privacy, assuring that every employee has the respect he or she deserves in managing their health. The information shared with your care manager will not be shared with anyone else aside from those you identify as part of your care team, and will only be used to assist you with any identified care needs.   You can reach me at my direct line [phone] or through [address] In good health,    Evan Rodriguez RN  Associate Care Manager  Phone [phone]  [email or web address]

## 2022-05-13 NOTE — CARE COORDINATION
[address] [facility] ED Follow Up Call    2022    Patient: Luis Carlos Magana Patient : 1970   MRN: [id number]  Reason for Admission: Syncope and collapse  Discharge Date: 22         Care Transitions ED Follow Up    Care Transitions Interventions  Do you have all of your prescriptions and are they filled?: Yes         Patient Home Equipment: [id number] Kelvin Roman ([facility]) attempted to contact the patient by telephone to perform post ED visit assessment. Not able to leave a message. Will continue to outreach to patient. Will send UTR letter in the meantime.

## 2022-06-14 ENCOUNTER — NURSE ONLY (OUTPATIENT)
Dept: PRIMARY CARE CLINIC | Age: 52
End: 2022-06-14

## 2022-06-14 DIAGNOSIS — Z11.52 ENCOUNTER FOR SCREENING FOR COVID-19: ICD-10-CM

## 2022-06-14 DIAGNOSIS — Z11.52 ENCOUNTER FOR SCREENING FOR COVID-19: Primary | ICD-10-CM

## 2022-06-15 LAB — SARS-COV-2, PCR: NOT DETECTED

## 2022-06-17 ENCOUNTER — HOSPITAL ENCOUNTER (OUTPATIENT)
Dept: CT IMAGING | Age: 52
Discharge: HOME | End: 2022-06-19
Payer: COMMERCIAL

## 2022-06-17 ENCOUNTER — APPOINTMENT (OUTPATIENT)
Dept: CT IMAGING | Age: 52
End: 2022-06-17
Payer: COMMERCIAL

## 2022-06-17 ENCOUNTER — HOSPITAL ENCOUNTER (OUTPATIENT)
Dept: PULMONOLOGY | Age: 52
Discharge: HOME | End: 2022-06-17
Payer: COMMERCIAL

## 2022-06-17 DIAGNOSIS — R05.3 CHRONIC COUGH: ICD-10-CM

## 2022-06-17 PROCEDURE — 71250 CT THORAX DX C-: CPT

## 2022-06-17 PROCEDURE — 94070 EVALUATION OF WHEEZING: CPT

## 2022-06-20 NOTE — PROCEDURES
[address]                  [address] [facility],  [facility]                               PULMONARY FUNCTION    PATIENT NAME: Aj Church                  :        1970  MED REC NO:   [id number]                            ROOM:  ACCOUNT NO:   [de-identified]                           ADMIT DATE: 2022  PROVIDER:     Mina Coello MD    DATE OF PROCEDURE:  2022    TEST:  Methacholine challenge test.    DIAGNOSES:  Coughing and wheezing, and history of COVID. FINDINGS:  This was a methacholine challenge. Baseline spirometry  revealed moderately reduced FVC and FEV1 with normal FEV1/FVC ratio. The FVC was down somewhat from 2021 study. There was no decline  in the FEV1 after aerosolized diluent so methacholine was given in four  stages to 10 mg/mL. In the fourth stage, there was a 22% decline in the  FEV1, this was at the 10 mg/mL dose. IMPRESSION:  This bronchoprovocation test was consistent with asthma.         Maya Ortez MD    D: 2022 8:28:15       T: 2022 8:30:33     [email or web address]  Job#: [id number]     Doc#: [id number]    CC:

## 2022-07-18 ENCOUNTER — TELEPHONE (OUTPATIENT)
Dept: FAMILY MEDICINE CLINIC | Age: 52
End: 2022-07-18

## 2022-07-18 NOTE — TELEPHONE ENCOUNTER
----- Message from Moralesnoé Tracy sent at 7/18/2022 12:36 PM EDT -----  Subject: Results Request    QUESTIONS  Results: pulmonary resutls; Ordered by:   Date Performed:   ---------------------------------------------------------------------------  --------------  Valerie John SJURIW    [phone]; OK to leave message on voicemail  ---------------------------------------------------------------------------  --------------

## 2022-08-01 ENCOUNTER — CARE COORDINATION (OUTPATIENT)
Dept: OTHER | Facility: CLINIC | Age: 52
End: 2022-08-01

## 2022-08-01 NOTE — CARE COORDINATION
Ambulatory Care Coordination Note  8/1/2022    ACC: Moni Ortiz RN    Ambulatory Care Manager (ACM) attempted to contact the patient by telephone to discuss ACM program, assess needs, and complete enrollment if appropriate. Not able to leave a message. Will send unable to reach letter in the meantime. ACM will sign off as not able to reach patient after several attempts. Prior to Admission medications    Medication Sig Start Date End Date Taking?  Authorizing Provider   spironolactone (ALDACTONE) 25 MG tablet Take 1 tablet by mouth daily 4/26/22   Gwen Hooper MD   budesonide-formoterol [facility]) 160-4.5 MCG/ACT AERO Inhale 2 puffs into the lungs 2 times daily 4/26/22   Gwen Hooper MD   albuterol sulfate HFA (VENTOLIN HFA) 108 (90 Base) MCG/ACT inhaler Inhale 2 puffs into the lungs every 4-6 hours as needed for Wheezing or Shortness of Breath 4/26/22   Gwen Hooper MD   potassium chloride (KLOR-CON M) 20 MEQ extended release tablet Take 2 tablets by mouth daily 2/25/22   Gwen Hooper MD   sertraline (ZOLOFT) 25 MG tablet Take 1 tablet by mouth daily 1/4/22   Gwen Hooper MD   amLODIPine (NORVASC) 10 MG tablet Take 1 tablet by mouth daily 9/17/21   Gwen Hooper MD   metoprolol tartrate (LOPRESSOR) 50 MG tablet Take 1 tablet by mouth 2 times daily 9/17/21   Gwen Hooper MD   albuterol (PROVENTIL) (5 MG/ML) 0.5% nebulizer solution Take 1 mL by nebulization 4 times daily as needed for Wheezing 4/2/21   Gwen Hooper MD       Future Appointments   Date Time Provider Elly Blevins   8/29/2022  9:20 AM Wicho Ozuna MD [facility]

## 2022-08-01 NOTE — LETTER
August 4, 2022    [address] [facility] [address] [facility]      Dear Justine Vera    My name is Micky Feng RN, Associate Care Manager for [facility] and I have been trying to reach you. Associate Care Management (ACM)  is a free-of-charge confidential service provided to our employees and their family members covered by the [facility]. The program will provide an associate and his/her family with the [employer]'s expertise to assist in navigating the health care delivery system, provider services, and their overall care needs--so as to assure and improve health care interactions and enhance the quality of life. This service is designed to provide you with the opportunity to have a [employer] partner with you for the following services:    [address] [facility] when you come home from the hospital or emergency room    Providing education and connecting you with services and resources to help you to manage your diseases   Assisting you coordinating services or appointments to support your health care needs   Providing additional education, resources, or assistance in reaching your Be Well Health Program goals/ requirements (such as Be Well with Diabetes)       We can work together using your preferred communication -- telephone, mail, or MyChart. [facility] is dedicated to empowering the good health of its community and improving the quality of care and care experiences for employees and their families. We are committed to safeguarding patient confidentiality and privacy, assuring that every employee has the respect he or she deserves in managing their health. The information shared with your care manager will not be shared with anyone else aside from those you identify as part of your care team, and will only be used to assist you with any identified care needs.   You can reach me at my direct line [phone] or through [address] In good health,    Sean Godinez RN  Associate Care Manager  Phone [phone]  [email or web address]

## 2022-08-11 DIAGNOSIS — U09.9 CHRONIC POST-COVID-19 SYNDROME: ICD-10-CM

## 2022-08-11 DIAGNOSIS — I10 ESSENTIAL HYPERTENSION: ICD-10-CM

## 2022-08-11 RX ORDER — METOPROLOL TARTRATE 50 MG/1
50 TABLET, FILM COATED ORAL 2 TIMES DAILY
Qty: 60 TABLET | Refills: 0 | Status: SHIPPED | OUTPATIENT
Start: 2022-08-11

## 2022-08-11 RX ORDER — CHLORTHALIDONE 25 MG/1
25 TABLET ORAL DAILY
Qty: 30 TABLET | Refills: 3 | OUTPATIENT
Start: 2022-08-11

## 2022-08-11 RX ORDER — BUDESONIDE AND FORMOTEROL FUMARATE DIHYDRATE 160; 4.5 UG/1; UG/1
2 AEROSOL RESPIRATORY (INHALATION) 2 TIMES DAILY
Qty: 1 EACH | Refills: 1 | Status: SHIPPED
Start: 2022-08-11 | End: 2022-09-01 | Stop reason: SDUPTHER

## 2022-08-29 ENCOUNTER — OFFICE VISIT (OUTPATIENT)
Dept: FAMILY MEDICINE CLINIC | Age: 52
End: 2022-08-29
Payer: COMMERCIAL

## 2022-08-29 VITALS
HEIGHT: 67 IN | HEART RATE: 116 BPM | SYSTOLIC BLOOD PRESSURE: 169 MMHG | TEMPERATURE: 99.1 F | BODY MASS INDEX: 33 KG/M2 | OXYGEN SATURATION: 96 % | DIASTOLIC BLOOD PRESSURE: 116 MMHG | WEIGHT: 210.25 LBS

## 2022-08-29 DIAGNOSIS — J45.40 MODERATE PERSISTENT ASTHMA WITHOUT COMPLICATION: ICD-10-CM

## 2022-08-29 DIAGNOSIS — F32.A DEPRESSION, UNSPECIFIED DEPRESSION TYPE: ICD-10-CM

## 2022-08-29 DIAGNOSIS — I10 ESSENTIAL HYPERTENSION: ICD-10-CM

## 2022-08-29 DIAGNOSIS — R06.09 DYSPNEA ON EXERTION: ICD-10-CM

## 2022-08-29 DIAGNOSIS — E87.6 HYPOKALEMIA: ICD-10-CM

## 2022-08-29 DIAGNOSIS — U09.9 CHRONIC POST-COVID-19 SYNDROME: Primary | ICD-10-CM

## 2022-08-29 PROCEDURE — 99212 OFFICE O/P EST SF 10 MIN: CPT | Performed by: STUDENT IN AN ORGANIZED HEALTH CARE EDUCATION/TRAINING PROGRAM

## 2022-08-29 PROCEDURE — 99213 OFFICE O/P EST LOW 20 MIN: CPT | Performed by: STUDENT IN AN ORGANIZED HEALTH CARE EDUCATION/TRAINING PROGRAM

## 2022-08-29 SDOH — ECONOMIC STABILITY: FOOD INSECURITY: WITHIN THE PAST 12 MONTHS, THE FOOD YOU BOUGHT JUST DIDN'T LAST AND YOU DIDN'T HAVE MONEY TO GET MORE.: NEVER TRUE

## 2022-08-29 SDOH — ECONOMIC STABILITY: FOOD INSECURITY: WITHIN THE PAST 12 MONTHS, YOU WORRIED THAT YOUR FOOD WOULD RUN OUT BEFORE YOU GOT MONEY TO BUY MORE.: NEVER TRUE

## 2022-08-29 ASSESSMENT — PATIENT HEALTH QUESTIONNAIRE - PHQ9
SUM OF ALL RESPONSES TO PHQ QUESTIONS 1-9: 18
9. THOUGHTS THAT YOU WOULD BE BETTER OFF DEAD, OR OF HURTING YOURSELF: 0
8. MOVING OR SPEAKING SO SLOWLY THAT OTHER PEOPLE COULD HAVE NOTICED. OR THE OPPOSITE, BEING SO FIGETY OR RESTLESS THAT YOU HAVE BEEN MOVING AROUND A LOT MORE THAN USUAL: 3
4. FEELING TIRED OR HAVING LITTLE ENERGY: 3
5. POOR APPETITE OR OVEREATING: 0
SUM OF ALL RESPONSES TO PHQ QUESTIONS 1-9: 18
SUM OF ALL RESPONSES TO PHQ9 QUESTIONS 1 & 2: 3
3. TROUBLE FALLING OR STAYING ASLEEP: 3
10. IF YOU CHECKED OFF ANY PROBLEMS, HOW DIFFICULT HAVE THESE PROBLEMS MADE IT FOR YOU TO DO YOUR WORK, TAKE CARE OF THINGS AT HOME, OR GET ALONG WITH OTHER PEOPLE: 3
2. FEELING DOWN, DEPRESSED OR HOPELESS: 3
1. LITTLE INTEREST OR PLEASURE IN DOING THINGS: 0
7. TROUBLE CONCENTRATING ON THINGS, SUCH AS READING THE NEWSPAPER OR WATCHING TELEVISION: 3
6. FEELING BAD ABOUT YOURSELF - OR THAT YOU ARE A FAILURE OR HAVE LET YOURSELF OR YOUR FAMILY DOWN: 3
SUM OF ALL RESPONSES TO PHQ QUESTIONS 1-9: 18
SUM OF ALL RESPONSES TO PHQ QUESTIONS 1-9: 18

## 2022-08-29 ASSESSMENT — SOCIAL DETERMINANTS OF HEALTH (SDOH): HOW HARD IS IT FOR YOU TO PAY FOR THE VERY BASICS LIKE FOOD, HOUSING, MEDICAL CARE, AND HEATING?: NOT HARD AT ALL

## 2022-08-29 NOTE — PROGRESS NOTES
S: 46 y.o. female presents today for Post-COVID Symptoms    HTN: did not take medications today; forgot  Hypokalemia: dose take potassium    Post covid cough since 2/2021  Sob with walking / laying down  O2 after waking / sleeping in 60%  HA, migraines since covid 3x per week  Depressed  Recent diagnosis of asthma with Dr. Silverio Marx: on symbicort and albuterol 2x per day does help      O: VS: BP (!) 169/116   Pulse (!) 116   Temp 99.1 °F (37.3 °C) (Temporal)   Ht 5' 6.5\" (1.689 m)   Wt 210 lb 4 oz (95.4 kg)   LMP 07/29/2022 Comment: current  SpO2 96%   BMI 33.43 kg/m²   AAO/NAD, appropriate affect for mood  CV:  tachycardic, no murmur  Resp: CTAB  Abdomen: SNTND  Ext: no edema    Assessment/Plan:   1) HTN - restart medications; close f/u for recheck; to call with new BP at home after taking home meds  2) Hypokalemia - repeat BMP; continue potassium  3) tachycardia / HOYT - EKG today declines; obtain echo; consider stress. 4) Post covid cough syndrome - monitor for now; see below  5) asthma - continue to f/u with Dr. Silverio Marx; continue symbicort and albuterol; can add singular   6) Depression - referral to counseling  RTO: 2 week f/u     Attending Physician Statement  I have discussed the case, including pertinent history and exam findings with the resident. I agree with the documented assessment and plan.       Electronically signed by Miguel Dobson MD on 8/29/2022 at 10:41 AM

## 2022-08-29 NOTE — PROGRESS NOTES
[address] [place] [address]  FAMILY MEDICINE RESIDENCY PROGRAM  DATE OF VISIT : 2022    Patient : Yulia Fulton   Age : 46 y.o.  : 1970   MRN : [id number]   ______________________________________________________________________    Chief Complaint :   Chief Complaint   Patient presents with    Post-COVID Symptoms       HPI : Yulia Fulton is 46 y.o. female who presented to the clinic today for post covid symptoms and HTN. Post covid sx- Has been endorsing migraines, productive cough with clear sputum and dyspnea on exertion after having covid in 2021. Symptoms have not worsened. Follows with Dr. Juan Antonio Aguilar. PFTs in 2022 showed obstructive disease consistent with asthma. She takes symbicort, albuterol and nebulizer. Uses rescue inhaler 2x/day. Cough wakes her up out of sleep occasionally. During one instance, she checked her SPO2 which was in 62s. Denies fever, chills, nausea, vomiting, chest pain. Was previously doing pulm rehab but stopped 6 months ago due to cost. She is not vaccinated against       HTN- Did not take meds this AM. Was supposed to be on spironolactone, lopressor, and norvasc, but has not been taking spironolactone and norvasc. Unsure why. Checks BP at home, 124/79. Denies CP, palpitations, leg swelling. Past Medical History :  Past Medical History:   Diagnosis Date    Hypertension     Long COVID      No past surgical history on file. Allergies : Allergies   Allergen Reactions    Penicillins Hives and Itching       Medication List :    Current Outpatient Medications   Medication Sig Dispense Refill    budesonide-formoterol (SYMBICORT) 160-4.5 MCG/ACT AERO Inhale 2 puffs into the lungs in the morning and 2 puffs before bedtime. 1 each 1    metoprolol tartrate (LOPRESSOR) 50 MG tablet Take 1 tablet by mouth in the morning and 1 tablet before bedtime.  60 tablet 0    albuterol sulfate HFA (VENTOLIN HFA) 108 (90 Base) MCG/ACT inhaler Inhale 2 puffs into the lungs every 4-6 hours as needed for Wheezing or Shortness of Breath 1 each 3    potassium chloride (KLOR-CON M) 20 MEQ extended release tablet Take 2 tablets by mouth daily 30 tablet 1    sertraline (ZOLOFT) 25 MG tablet Take 1 tablet by mouth daily 30 tablet 3    albuterol (PROVENTIL) (5 MG/ML) 0.5% nebulizer solution Take 1 mL by nebulization 4 times daily as needed for Wheezing 120 each 3    spironolactone (ALDACTONE) 25 MG tablet Take 1 tablet by mouth daily (Patient not taking: Reported on 8/29/2022) 90 tablet 1    amLODIPine (NORVASC) 10 MG tablet Take 1 tablet by mouth daily (Patient not taking: Reported on 8/29/2022) 30 tablet 3     No current facility-administered medications for this visit. Review of Systems :  Review of Systems   Constitutional:  Negative for chills and fever. Respiratory:  Positive for cough and shortness of breath. Negative for chest tightness and wheezing. Cardiovascular:  Negative for chest pain, palpitations and leg swelling. Gastrointestinal:  Negative for abdominal pain, constipation, diarrhea, nausea and vomiting. Genitourinary:  Negative for dysuria. Neurological:  Positive for headaches. Negative for dizziness, weakness and light-headedness. ______________________________________________________________________    Physical Exam :    Vitals: BP (!) 169/116   Pulse (!) 116   Temp 99.1 °F (37.3 °C) (Temporal)   Ht 5' 6.5\" (1.689 m)   Wt 210 lb 4 oz (95.4 kg)   LMP 07/29/2022 Comment: current  SpO2 96%   BMI 33.43 kg/m²   General Appearance: Well developed, awake, alert, oriented, and in NAD  HEENT: NCAT, MMM, no pallor or icterus. Neck: Symmetrical, trachea midline. Chest wall/Lung: CTAB, respirations unlabored. No ronchi/wheezing/rales   Heart: regular rhythm, tachycardic, normal S1 and S2, no murmurs, rubs or gallops. Abdomen: SNTND, bowel sounds present   Extremities: Extremities normal, atraumatic, no cyanosis, clubbing or  edema.   Skin: Skin color, texture, turgor normal, no rashes or lesions  Musculokeletal: ROM grossly normal in all joints of extremities, no obvious joint swelling. Lymphnodes: No lymph node enlargement appreciated  Neurologic: Alert&Oriented x3. No focal motor deficits detected. Psychiatric: Normal mood. Normal affect. Normal behavior. ______________________________________________________________________    Assessment & Plan :    1. Dyspnea on exertion  - could be 2/2 post covid syndrome vs asthma vs new onset CHF    - continue albuterol neb, albuterol inhaler and symbicort   - start singulair for better control of moderate persistent asthma  - ECHO in 9/2021 EF 60-65% with normal LV; repeat to r/o new onset CHF. Consider stress test     2. Essential hypertension  - uncontrolled in office today due to not taking meds this AM  - with elevated BP and tachycardia, recommended EKG. Patient declined currently. Monitor for now. - Continue lopressor 50mg BID   - Monitor BP at home. Consider restarting spironolactone if uncontrolled given hypokalemia on CMP 5/2022. 3. Hypokalemia  - K 2.8, need to recheck CMP but patient declined   - Continue Kcl 40  for now. Recheck BMP during next visit         Return to Office: Return in about 2 weeks (around 9/12/2022) for HTN, asthma.     Tonio Beckman MD  Case discussed with Dr. Kathrin Jamison

## 2022-09-01 RX ORDER — MONTELUKAST SODIUM 10 MG/1
10 TABLET ORAL DAILY
Qty: 30 TABLET | Refills: 3 | Status: SHIPPED | OUTPATIENT
Start: 2022-09-01

## 2022-09-01 RX ORDER — SERTRALINE HYDROCHLORIDE 25 MG/1
25 TABLET, FILM COATED ORAL DAILY
Qty: 30 TABLET | Refills: 3 | Status: SHIPPED | OUTPATIENT
Start: 2022-09-01

## 2022-09-01 RX ORDER — BUDESONIDE AND FORMOTEROL FUMARATE DIHYDRATE 160; 4.5 UG/1; UG/1
2 AEROSOL RESPIRATORY (INHALATION) 2 TIMES DAILY
Qty: 1 EACH | Refills: 1 | Status: SHIPPED | OUTPATIENT
Start: 2022-09-01

## 2022-09-01 RX ORDER — POTASSIUM CHLORIDE 20 MEQ/1
40 TABLET, EXTENDED RELEASE ORAL DAILY
Qty: 30 TABLET | Refills: 1 | Status: SHIPPED | OUTPATIENT
Start: 2022-09-01

## 2022-09-01 ASSESSMENT — ENCOUNTER SYMPTOMS
SHORTNESS OF BREATH: 1
ABDOMINAL PAIN: 0
CONSTIPATION: 0
VOMITING: 0
NAUSEA: 0
WHEEZING: 0
DIARRHEA: 0
CHEST TIGHTNESS: 0
COUGH: 1

## 2022-10-05 ENCOUNTER — TELEPHONE (OUTPATIENT)
Dept: FAMILY MEDICINE CLINIC | Age: 52
End: 2022-10-05

## 2022-10-05 NOTE — TELEPHONE ENCOUNTER
Patient called having trouble sleeping due to sleep apnea  and SOB   has rescue inhalers  feeling very fatigued . Problem started since she had covid. Seeing Pulmonology. Does not want to wait until November to see Dr. Heidi Priest . Scheduled with first available provider Dr. Kel Rucker 10/7/22.

## 2022-10-07 ENCOUNTER — OFFICE VISIT (OUTPATIENT)
Dept: FAMILY MEDICINE CLINIC | Age: 52
End: 2022-10-07
Payer: COMMERCIAL

## 2022-10-07 VITALS
DIASTOLIC BLOOD PRESSURE: 90 MMHG | OXYGEN SATURATION: 96 % | WEIGHT: 212 LBS | SYSTOLIC BLOOD PRESSURE: 172 MMHG | BODY MASS INDEX: 33.71 KG/M2 | TEMPERATURE: 98.4 F | HEART RATE: 82 BPM

## 2022-10-07 DIAGNOSIS — G47.33 OSA (OBSTRUCTIVE SLEEP APNEA): ICD-10-CM

## 2022-10-07 DIAGNOSIS — R06.09 DYSPNEA ON EXERTION: Primary | ICD-10-CM

## 2022-10-07 PROCEDURE — 99213 OFFICE O/P EST LOW 20 MIN: CPT | Performed by: FAMILY MEDICINE

## 2022-10-07 PROCEDURE — 99212 OFFICE O/P EST SF 10 MIN: CPT | Performed by: FAMILY MEDICINE

## 2022-10-07 RX ORDER — TIOTROPIUM BROMIDE 18 UG/1
18 CAPSULE ORAL; RESPIRATORY (INHALATION) DAILY
Qty: 90 CAPSULE | Refills: 1 | Status: SHIPPED | OUTPATIENT
Start: 2022-10-07

## 2022-10-07 RX ORDER — FAMOTIDINE 20 MG/1
20 TABLET, FILM COATED ORAL 2 TIMES DAILY
Qty: 60 TABLET | Refills: 0 | Status: SHIPPED | OUTPATIENT
Start: 2022-10-07

## 2022-10-07 NOTE — PROGRESS NOTES
[address] [facility]  Department of Family Medicine  Family Medicine Residency Program      Patient:  Rachelle Montelongo 46 y.o. female     Date of Service: 10/7/22      Chiefcomplaint:   Chief Complaint   Patient presents with    Sleep Apnea    Shortness of Breath     Pt has been having these symptoms since she had covid-19, and year ago. History of Present Illness     60-year-old female in office for persistent cough, shortness of breath at rest and with exertion. Patient had a previous history of COVID-positive in February 2021. Patient is also following outpatient with pulmonologist.  Patient notes that she was diagnosed with post-COVID syndrome, she also noted that she was diagnosed with asthma after being confirmed on PFTs. She is currently maintained on Symbicort 2 puffs twice daily, albuterol as well. She notes that these do provide her symptomatic relief though not a significant amount. The symptoms are not associated with seasonal allergies though patient does have a previous history of seasonal allergies. She has previously been ordered an echo, not yet completed. Did also previously attend pulmonary rehab though due to cost was unable to continue. Patient notes she does use her albuterol inhaler several times per night as she wakes up coughing and become short of breath at night. No other history of reflux symptoms, current symptoms such as nausea, vomiting, neck or chest burning with food. Patient is high risk on STOP-BANG questionnaire for [place].     Allergies:    Penicillins    Medication List:    Current Outpatient Medications   Medication Sig Dispense Refill    tiotropium (SPIRIVA HANDIHALER) 18 MCG inhalation capsule Inhale 1 capsule into the lungs daily 90 capsule 1    famotidine (PEPCID) 20 MG tablet Take 1 tablet by mouth 2 times daily 60 tablet 0    sertraline (ZOLOFT) 25 MG tablet Take 1 tablet by mouth daily 30 tablet 3    potassium chloride (KLOR-CON M) 20 MEQ extended release tablet Take 2 tablets by mouth daily 30 tablet 1    budesonide-formoterol (SYMBICORT) 160-4.5 MCG/ACT AERO Inhale 2 puffs into the lungs 2 times daily 1 each 1    montelukast (SINGULAIR) 10 MG tablet Take 1 tablet by mouth daily 30 tablet 3    metoprolol tartrate (LOPRESSOR) 50 MG tablet Take 1 tablet by mouth in the morning and 1 tablet before bedtime. 60 tablet 0    albuterol sulfate HFA (VENTOLIN HFA) 108 (90 Base) MCG/ACT inhaler Inhale 2 puffs into the lungs every 4-6 hours as needed for Wheezing or Shortness of Breath 1 each 3    albuterol (PROVENTIL) (5 MG/ML) 0.5% nebulizer solution Take 1 mL by nebulization 4 times daily as needed for Wheezing 120 each 3     No current facility-administered medications for this visit. Review of Systems   Constitutional:  Negative for chills and fever. Respiratory:  Negative for cough, shortness of breath and wheezing. Cardiovascular:  Negative for chest pain. Gastrointestinal:  Negative for abdominal pain, diarrhea and vomiting. Musculoskeletal:  Negative for back pain and neck pain. Skin:  Negative for color change. Neurological:  Negative for dizziness, tremors, weakness and light-headedness. Physical Exam   Physical Exam  Constitutional:       Appearance: She is well-developed. HENT:      Head: Normocephalic. Right Ear: External ear normal.      Left Ear: External ear normal.   Cardiovascular:      Rate and Rhythm: Normal rate and regular rhythm. Heart sounds: Normal heart sounds. No murmur heard. Pulmonary:      Effort: Pulmonary effort is normal.      Breath sounds: Normal breath sounds. No wheezing. Abdominal:      Palpations: Abdomen is soft. Tenderness: There is no abdominal tenderness. Musculoskeletal:         General: Normal range of motion. Cervical back: Normal range of motion. Skin:     General: Skin is warm. Findings: No erythema.    Neurological:      Mental Status: She is alert and oriented to person, place, and time. Psychiatric:         Behavior: Behavior normal.     Vitals:    10/07/22 1007   BP: (!) 172/90   Pulse: 82   Temp: 98.4 °F (36.9 °C)   SpO2: 96%         Assessment and Plan     1. Chronic cough  - Broad differential at the current time. Symptoms likely secondary to reflux VS post-COVID cough syndrome VS allergic rhinitis, seasonal allergies VS asthma  - We will trial Pepcid 20 mg daily for 1 month  - We will also add Spiriva at this time for further symptomatic relief. - Recommend to follow-up with pulmonology regularly, may consider reentering pulmonary rehab. 2.  Dyspnea on exertion  - Also likely multifactorial secondary to post-COVID syndrome VS underlying congestive heart failure. - We will obtain echo  - Also obtain sleep study to evaluate and treat.       RTO 1 month  Case discussed with Dr. Shorty Paige

## 2022-10-07 NOTE — PROGRESS NOTES
S: 46 y.o. female who presents for persistent cough and SOB since February 2021. She did see pulmonology and was dx with post covid syndrome. She is on symbicort and albuterol which only helps somewhat. The symptoms are not seasonal.  No chest pain or discomfort. No prior Echo. She was signed up for pulmonary rehab, but was unable to go due to high copays. PFTs consistent with asthma. She does wake up at night short of breath and uses her albuterol. She is not on anti-reflux medications but does have the cough and may be related to GERD. O: VS: BP (!) 172/90   Pulse 82   Temp 98.4 °F (36.9 °C) (Temporal)   Wt 212 lb (96.2 kg)   LMP 10/07/2022 (Exact Date) Comment: current  SpO2 96%   BMI 33.71 kg/m²    General: NAD, appropriate affect and grooming   CV:  RRR, no gallops, rubs, or murmurs   Resp: CTAB   Abd:  Soft, nontender   Ext:  No edema  Impression/Plan:   Chronic cough  Ddx includes reflux, asthma, post covid cough syndrome, allergic rhinitis  Trial H2 blocker x 1 month  Add spiriva  Encourage follow up with pulmonology  HOYT  Sleep study  2D echo    Follow up in 1 month     Attending Physician Statement  I have discussed the case, including pertinent history and exam findings with the resident. I agree with the documented assessment and plan.

## 2022-11-17 ENCOUNTER — OFFICE VISIT (OUTPATIENT)
Dept: FAMILY MEDICINE CLINIC | Age: 52
End: 2022-11-17
Payer: COMMERCIAL

## 2022-11-17 VITALS
BODY MASS INDEX: 33.45 KG/M2 | WEIGHT: 213.1 LBS | HEIGHT: 67 IN | OXYGEN SATURATION: 99 % | SYSTOLIC BLOOD PRESSURE: 172 MMHG | HEART RATE: 118 BPM | DIASTOLIC BLOOD PRESSURE: 102 MMHG

## 2022-11-17 DIAGNOSIS — F33.1 MODERATE EPISODE OF RECURRENT MAJOR DEPRESSIVE DISORDER (HCC): Primary | ICD-10-CM

## 2022-11-17 DIAGNOSIS — F41.9 ANXIETY: ICD-10-CM

## 2022-11-17 DIAGNOSIS — R06.09 DYSPNEA ON EXERTION: ICD-10-CM

## 2022-11-17 DIAGNOSIS — I10 ESSENTIAL HYPERTENSION: ICD-10-CM

## 2022-11-17 PROCEDURE — 99213 OFFICE O/P EST LOW 20 MIN: CPT | Performed by: STUDENT IN AN ORGANIZED HEALTH CARE EDUCATION/TRAINING PROGRAM

## 2022-11-17 PROCEDURE — 3074F SYST BP LT 130 MM HG: CPT | Performed by: STUDENT IN AN ORGANIZED HEALTH CARE EDUCATION/TRAINING PROGRAM

## 2022-11-17 PROCEDURE — 99212 OFFICE O/P EST SF 10 MIN: CPT | Performed by: STUDENT IN AN ORGANIZED HEALTH CARE EDUCATION/TRAINING PROGRAM

## 2022-11-17 PROCEDURE — 3078F DIAST BP <80 MM HG: CPT | Performed by: STUDENT IN AN ORGANIZED HEALTH CARE EDUCATION/TRAINING PROGRAM

## 2022-11-17 NOTE — PROGRESS NOTES
[facility]  FAMILY MEDICINE RESIDENCY PROGRAM  DATE OF VISIT : 2022    Patient : Yogesh Hankins   Age : 46 y.o.  : 1970   MRN : [id number]   ______________________________________________________________________    Chief Complaint :   Chief Complaint   Patient presents with    Follow-up       HPI : Yogesh Hankins is 46 y.o. female who presented to the clinic today for co treatment with Juju Mccormack. anxiety and depression. Had COVID last year and has been dealing with long covid symptoms since then, such as HOYT, HA. Difficulty with sleep, appetite, energy. Feels winded walking through stores which causes her to feel embarrassed. This caused her to stay at home most of the time. Her mother is her main support and encourages her to go outside. Has a lot anxiety surrounding getting covid again and not having an income. She is not vaccinated against COVID. 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  She also has has savings and mother helps financially. Was previously on klonopin and prozac. This caused her to sleep all of the time. Is prescribed zoloft 25 mg daily, but has not been taking it. She stated that Xanax has worked for her in the past and would like to try it again. She is also interested in trying Lexapro and hydroxyzine. Blood pressure is also elevated in the office. She denies any chest pain, palpitations. She is only taking Lopressor 50 mg once daily. She she thought she was also taking spironolactone 25 mg daily, however it is not on her med list.  She would like to continue both of these medications. She does not check her blood pressure at home. Past Medical History :  Past Medical History:   Diagnosis Date    Hypertension     Long COVID      No past surgical history on file. Allergies :    Allergies   Allergen Reactions    Penicillins Hives and Itching       Medication List :    Current Outpatient Medications Medication Sig Dispense Refill    tiotropium (SPIRIVA HANDIHALER) 18 MCG inhalation capsule Inhale 1 capsule into the lungs daily 90 capsule 1    sertraline (ZOLOFT) 25 MG tablet Take 1 tablet by mouth daily 30 tablet 3    potassium chloride (KLOR-CON M) 20 MEQ extended release tablet Take 2 tablets by mouth daily 30 tablet 1    budesonide-formoterol (SYMBICORT) 160-4.5 MCG/ACT AERO Inhale 2 puffs into the lungs 2 times daily 1 each 1    montelukast (SINGULAIR) 10 MG tablet Take 1 tablet by mouth daily 30 tablet 3    metoprolol tartrate (LOPRESSOR) 50 MG tablet Take 1 tablet by mouth in the morning and 1 tablet before bedtime. 60 tablet 0    albuterol sulfate HFA (VENTOLIN HFA) 108 (90 Base) MCG/ACT inhaler Inhale 2 puffs into the lungs every 4-6 hours as needed for Wheezing or Shortness of Breath 1 each 3    albuterol (PROVENTIL) (5 MG/ML) 0.5% nebulizer solution Take 1 mL by nebulization 4 times daily as needed for Wheezing 120 each 3    famotidine (PEPCID) 20 MG tablet Take 1 tablet by mouth 2 times daily (Patient not taking: Reported on 11/17/2022) 60 tablet 0     No current facility-administered medications for this visit. Review of Systems :  Review of Systems   Constitutional:  Positive for activity change, appetite change and fatigue. Respiratory:  Positive for shortness of breath. Psychiatric/Behavioral:  Positive for decreased concentration, dysphoric mood and sleep disturbance. Negative for self-injury and suicidal ideas. The patient is nervous/anxious. ______________________________________________________________________    Physical Exam :    Vitals: BP (!) 172/102   Pulse (!) 118   Ht 5' 6.5\" (1.689 m)   Wt 213 lb 1.6 oz (96.7 kg)   SpO2 99%   BMI 33.88 kg/m²   General Appearance: Well developed, awake, alert, oriented, and in NAD  HEENT: NCAT, no pallor or icterus. Neck: Symmetrical, trachea midline. Chest wall/Lung:respirations unlabored  Neurologic: Alert&Oriented x3.  No focal motor deficits detected. Psychiatric: sad mood. Tearful affect. poor eye contact.  ______________________________________________________________________    Assessment & Plan :    1. Moderate episode of recurrent major depressive disorder ([address]  -Discussed side effects of Zoloft versus Lexapro. Discussed that Zoloft has more GI side effects. She is interested in trying Lexapro. - Start Lexapro 10 mg daily  -She is interested in restarting counseling. However she has limited income. Dr. Milan Ruvalcaba and I discussed for patient to look into Medicaid as she may qualify as she is not working currently. 2. Anxiety  -Patient initially wanted to try Xanax as it has worked for her in the past.  Discussed that it is more likely to be habit-forming. She did have an alternative and wanted to try hydroxyzine. Discussed that this may also help with her sleep as well  -Start hydroxyzine 25 mg every 8 hours as needed    3. Dyspnea on exertion  -Refill albuterol  - We will discuss optimization of asthma treatment during next visit    4. Essential hypertension  -Uncontrolled in the office today however she was extremely upset. Recheck was 185/85 after settling down. - Increase Lopressor to 25 mg twice daily  - Restart spironolactone 25 mg daily  -Encouraged to check ambulatory blood pressure    5. Tachycardia  - Can be 2/2 uncontrolled anxiety  - Patient is taking Lopressor 50 mg daily for BP, will increase that to also help with heart rate        Return to Office: Return in about 4 weeks (around 12/15/2022).     Lily Galo MD  Case discussed with Dr. Flory Arechiga

## 2022-11-17 NOTE — PROGRESS NOTES
S: Ramya Reeder 46 y.o. female  here for follow-up anxiety/depression. Co-treatment with Dr. Kellie Jones. Also noted to have very high blood pressure. Anxiety/depression: Onset of symptoms x1.5-2 years ago following infection with COVID and complicated by long COVID. Has multiple signs and symptoms of a positive PHQ-9. Very anxious, especially about getting COVID again; however, patient is unvaccinated status. She recently went on disability because of the long COVID symptoms. Patient reports previous trial of fluoxetine and clonazepam made her very very tired and sleepy. She had been prescribed sertraline 25 mg a day but did not take it, citing concerns about same side effects. After discussion about alternative treatment options, shared decision making arrived at a trial of E citalopram and hydroxyzine. Hypertension: Uncontrolled today. Current medication regimen includes metoprolol tartrate 50 mg twice daily. She was also taking spironolactone 25 mg a day; also in the past, she had been on amlodipine daily. O: VS: BP (!) 172/102   Pulse (!) 118   Ht 5' 6.5\" (1.689 m)   Wt 213 lb 1.6 oz (96.7 kg)   SpO2 99%   BMI 33.88 kg/m²    General: NAD. Tearful sad affect and mood. Very anxious. Mild to moderate respiratory distress, including cough and shortness of breath on exam              HEENT: WDL              Neck: WDL   CV: Tachycardic, no gallops, rubs, or murmurs   Resp: CTAB no R/R/W   Abd:  Soft, nontender, no masses    Ext:  no C/C/E    Assessment / Plan:      Quita Jackman was seen today for follow-up. Diagnoses and all orders for this visit:    1. Moderate episode of recurrent major depressive disorder ([facility] [address]  -Discussed side effects of Zoloft versus Lexapro. Discussed that Zoloft has more GI side effects. She is interested in trying Lexapro. - Start Lexapro 10 mg daily  -She is interested in restarting counseling. However she has limited income.   Dr. Lianet Bond and I discussed for patient to look into Medicaid as she may qualify as she is not working currently. 2. Anxiety  -Patient initially wanted to try Xanax as it has worked for her in the past.  Discussed that it is more likely to be habit-forming. She did have an alternative and wanted to try hydroxyzine. Discussed that this may also help with her sleep as well  -Start hydroxyzine 25 mg every 8 hours as needed     3. Dyspnea on exertion  -Refill albuterol  - We will discuss optimization of asthma treatment during next visit     4. Essential hypertension  -Uncontrolled in the office today however she was extremely upset. Recheck was 185/85 after settling down. - Increase Lopressor to 25 mg twice daily  - Restart spironolactone 25 mg daily  -Encouraged to check ambulatory blood pressure    5. Tachycardia  - Can be 2/2 uncontrolled anxiety  - Patient is taking Lopressor 50 mg daily for BP, will increase that to also help with heart rate           Return to Office: Return in about 4 weeks (around 12/15/2022). Assessment/Plan  1. Anxiety and depression: At this time, untreated. Shared decision making arrived a trial of E citalopram nightly and hydroxyzine nightly and every 8 hours as needed worsening anxiety. 2.  Uncontrolled hypertension: Patient had only been taking her metoprolol tartrate once a day. She was encouraged to take it as directed (twice daily); spironolactone 25 mg daily added back. 3.  Tachycardia: Due to underdosing of medication, uncontrolled hypertension, anxiety/depression, long COVID. Treatment as above  4. Long COVID: patient remains unvaccinated; encouraged to get vaccinated against COVID    Because she is disabled, she is encouraged to apply for secondary health insurance so that she can continue to receive optimal health care      Return in about 4 weeks (around 12/15/2022). F/U on all of the above in 1 month    Attending Physician Statement  I have discussed the case, including pertinent history and exam findings with the resident. I also have seen the patient and performed key portions of the examination. I agree with the documented assessment and plan.          Kaushik Rolle MD

## 2022-11-18 RX ORDER — SPIRONOLACTONE 25 MG/1
25 TABLET ORAL DAILY
Qty: 30 TABLET | Refills: 4 | Status: SHIPPED | OUTPATIENT
Start: 2022-11-18

## 2022-11-18 RX ORDER — HYDROXYZINE HYDROCHLORIDE 25 MG/1
25 TABLET, FILM COATED ORAL EVERY 8 HOURS PRN
Qty: 30 TABLET | Refills: 0 | Status: SHIPPED | OUTPATIENT
Start: 2022-11-18 | End: 2022-11-28

## 2022-11-18 RX ORDER — ESCITALOPRAM OXALATE 10 MG/1
10 TABLET ORAL DAILY
Qty: 30 TABLET | Refills: 3 | Status: SHIPPED | OUTPATIENT
Start: 2022-11-18

## 2022-11-20 ASSESSMENT — ENCOUNTER SYMPTOMS: SHORTNESS OF BREATH: 1

## 2023-01-20 DIAGNOSIS — I10 ESSENTIAL HYPERTENSION: ICD-10-CM

## 2023-01-20 RX ORDER — METOPROLOL TARTRATE 50 MG/1
50 TABLET, FILM COATED ORAL 2 TIMES DAILY
Qty: 60 TABLET | Refills: 0 | Status: SHIPPED | OUTPATIENT
Start: 2023-01-20

## 2023-01-20 NOTE — TELEPHONE ENCOUNTER
Last Appointment:  11/17/2022  Future Appointments   Date Time Provider Elly Blevins   2/17/2023  2:20 PM [facility]     Scheduled for first available appointment

## 2023-02-17 ENCOUNTER — OFFICE VISIT (OUTPATIENT)
Dept: FAMILY MEDICINE CLINIC | Age: 53
End: 2023-02-17
Payer: COMMERCIAL

## 2023-02-17 VITALS
BODY MASS INDEX: 33.59 KG/M2 | TEMPERATURE: 99.1 F | SYSTOLIC BLOOD PRESSURE: 114 MMHG | RESPIRATION RATE: 16 BRPM | WEIGHT: 214 LBS | HEART RATE: 105 BPM | HEIGHT: 67 IN | OXYGEN SATURATION: 99 % | DIASTOLIC BLOOD PRESSURE: 73 MMHG

## 2023-02-17 DIAGNOSIS — E87.6 HYPOKALEMIA: ICD-10-CM

## 2023-02-17 DIAGNOSIS — J45.40 MODERATE PERSISTENT ASTHMA WITHOUT COMPLICATION: Primary | ICD-10-CM

## 2023-02-17 DIAGNOSIS — Z12.11 COLON CANCER SCREENING: ICD-10-CM

## 2023-02-17 DIAGNOSIS — D64.9 ANEMIA, UNSPECIFIED TYPE: ICD-10-CM

## 2023-02-17 DIAGNOSIS — L82.1 SEBORRHEIC KERATOSIS: ICD-10-CM

## 2023-02-17 DIAGNOSIS — I10 ESSENTIAL HYPERTENSION: ICD-10-CM

## 2023-02-17 DIAGNOSIS — E83.42 HYPOMAGNESEMIA: ICD-10-CM

## 2023-02-17 PROCEDURE — 3078F DIAST BP <80 MM HG: CPT | Performed by: STUDENT IN AN ORGANIZED HEALTH CARE EDUCATION/TRAINING PROGRAM

## 2023-02-17 PROCEDURE — 99212 OFFICE O/P EST SF 10 MIN: CPT | Performed by: STUDENT IN AN ORGANIZED HEALTH CARE EDUCATION/TRAINING PROGRAM

## 2023-02-17 PROCEDURE — 3074F SYST BP LT 130 MM HG: CPT | Performed by: STUDENT IN AN ORGANIZED HEALTH CARE EDUCATION/TRAINING PROGRAM

## 2023-02-17 PROCEDURE — 99213 OFFICE O/P EST LOW 20 MIN: CPT | Performed by: STUDENT IN AN ORGANIZED HEALTH CARE EDUCATION/TRAINING PROGRAM

## 2023-02-17 RX ORDER — SPIRONOLACTONE 25 MG/1
25 TABLET ORAL DAILY
Qty: 30 TABLET | Refills: 4 | Status: SHIPPED | OUTPATIENT
Start: 2023-02-17

## 2023-02-17 RX ORDER — MONTELUKAST SODIUM 10 MG/1
10 TABLET ORAL DAILY
Qty: 30 TABLET | Refills: 3 | Status: SHIPPED | OUTPATIENT
Start: 2023-02-17

## 2023-02-17 RX ORDER — METOPROLOL TARTRATE 50 MG/1
50 TABLET, FILM COATED ORAL 2 TIMES DAILY
Qty: 60 TABLET | Refills: 0 | Status: SHIPPED | OUTPATIENT
Start: 2023-02-17

## 2023-02-17 SDOH — ECONOMIC STABILITY: FOOD INSECURITY: WITHIN THE PAST 12 MONTHS, THE FOOD YOU BOUGHT JUST DIDN'T LAST AND YOU DIDN'T HAVE MONEY TO GET MORE.: NEVER TRUE

## 2023-02-17 SDOH — ECONOMIC STABILITY: INCOME INSECURITY: HOW HARD IS IT FOR YOU TO PAY FOR THE VERY BASICS LIKE FOOD, HOUSING, MEDICAL CARE, AND HEATING?: SOMEWHAT HARD

## 2023-02-17 SDOH — ECONOMIC STABILITY: HOUSING INSECURITY
IN THE LAST 12 MONTHS, WAS THERE A TIME WHEN YOU DID NOT HAVE A STEADY PLACE TO SLEEP OR SLEPT IN A SHELTER (INCLUDING NOW)?: NO

## 2023-02-17 SDOH — ECONOMIC STABILITY: FOOD INSECURITY: WITHIN THE PAST 12 MONTHS, YOU WORRIED THAT YOUR FOOD WOULD RUN OUT BEFORE YOU GOT MONEY TO BUY MORE.: SOMETIMES TRUE

## 2023-02-17 ASSESSMENT — ENCOUNTER SYMPTOMS
DIARRHEA: 0
RHINORRHEA: 0
SINUS PAIN: 0
ABDOMINAL PAIN: 0
SHORTNESS OF BREATH: 1
SORE THROAT: 0
COUGH: 0
CONSTIPATION: 0
NAUSEA: 0
VOMITING: 0

## 2023-02-17 ASSESSMENT — LIFESTYLE VARIABLES
HOW MANY STANDARD DRINKS CONTAINING ALCOHOL DO YOU HAVE ON A TYPICAL DAY: 1 OR 2
HOW OFTEN DO YOU HAVE A DRINK CONTAINING ALCOHOL: MONTHLY OR LESS

## 2023-02-17 NOTE — PROGRESS NOTES
[facility]  FAMILY MEDICINE RESIDENCY PROGRAM  DATE OF VISIT : 2023    Patient : Eduard Servin   Age : 46 y.o.  : 1970   MRN : [id number]   ______________________________________________________________________    Chief Complaint :   Chief Complaint   Patient presents with    Asthma    Skin Problem         HPI : Eduard Servin is 46 y.o. female who presented to the clinic today for asthma and skin lesion. Asthma -saw Dr. Dwight Varela several months ago. PFTs 2022 asthma. Taking albuterol neb and inhaler PRN, symbicort, singulair and spiriva. Taking neb once daily and rescue inhaler 4-5 times a day. Continues to endorse HOYT, fatigue but not worse than usual.  Denies CP, fever, chills or congestion. Cough with mucus, worsened in the past month. No sick contacts. Skin lesion -noticed on left shoulder. Has grown over the past few months. No itching ,drainage, swelling or redness. Only one lesion has been noticed. No insect bites. Patient's medications, allergies, past medical, surgical, social and family histories were reviewed and updated as appropriate. Past Medical History :  Past Medical History:   Diagnosis Date    Hypertension     Long COVID      No past surgical history on file. Allergies :    Allergies   Allergen Reactions    Penicillins Hives and Itching       Medication List :    Current Outpatient Medications   Medication Sig Dispense Refill    metoprolol tartrate (LOPRESSOR) 50 MG tablet Take 1 tablet by mouth 2 times daily 60 tablet 0    spironolactone (ALDACTONE) 25 MG tablet Take 1 tablet by mouth daily 30 tablet 4    montelukast (SINGULAIR) 10 MG tablet Take 1 tablet by mouth daily 30 tablet 3    albuterol (PROVENTIL) (5 MG/ML) 0.5% nebulizer solution Take 1 mL by nebulization 4 times daily as needed for Wheezing 120 each 3    escitalopram (LEXAPRO) 10 MG tablet Take 1 tablet by mouth daily 30 tablet 3    tiotropium (SPIRIVA HANDIHALER) 18 MCG inhalation capsule Inhale 1 capsule into the lungs daily 90 capsule 1    budesonide-formoterol (SYMBICORT) 160-4.5 MCG/ACT AERO Inhale 2 puffs into the lungs 2 times daily 1 each 1    albuterol sulfate HFA (VENTOLIN HFA) 108 (90 Base) MCG/ACT inhaler Inhale 2 puffs into the lungs every 4-6 hours as needed for Wheezing or Shortness of Breath 1 each 3     No current facility-administered medications for this visit. Review of Systems :  Review of Systems   Constitutional:  Negative for chills and fever. HENT:  Negative for congestion, rhinorrhea, sinus pain, sore throat and tinnitus. Respiratory:  Positive for shortness of breath (Chronic). Negative for cough. Cardiovascular:  Negative for chest pain, palpitations and leg swelling. Gastrointestinal:  Negative for abdominal pain, constipation, diarrhea, nausea and vomiting. Genitourinary:  Negative for dysuria. Skin:  Positive for wound. Neurological:  Negative for dizziness, weakness, light-headedness and headaches.   ______________________________________________________________________    Physical Exam :    Vitals: /73   Pulse (!) 105   Temp 99.1 °F (37.3 °C) (Temporal)   Resp 16   Ht 5' 6.5\" (1.689 m)   Wt 214 lb (97.1 kg)   LMP 02/02/2023   SpO2 99%   BMI 34.02 kg/m²   General Appearance: Well developed, awake, alert, oriented, and in NAD  HEENT: NCAT, MMM, no pallor or icterus. Neck: Symmetrical, trachea midline. Chest wall/Lung: CTAB, respirations unlabored. No ronchi/wheezing/rales  Heart: RRR, normal S1 and S2, no murmurs, rubs or gallops. Abdomen: SNTND  Extremities: Extremities normal, atraumatic, no cyanosis, clubbing or  edema. Skin: 2 mm round papule, stuck on appearance, lichenification, solid, brown color, smooth edges  Musculokeletal: ROM grossly normal in all joints of extremities, no obvious joint swelling. Lymphnodes: No lymph node enlargement appreciated  Neurologic: Alert&Oriented x3.  No focal motor deficits detected. Psychiatric: Normal mood. Normal affect. Normal behavior. ______________________________________________________________________    Assessment & Plan :    1. Moderate persistent asthma without complication  -Does not appear to be in acute exacerbation  - Instructed to follow-up with pulmonology  - Refill  - montelukast (SINGULAIR) 10 MG tablet; Take 1 tablet by mouth daily  Dispense: 30 tablet; Refill: 3    2. Seborrheic keratosis  -Lesion appears to be SK  - Only 1 lesion is present.  - Discussed treatment options and she would like to continue to monitor for now    3. Essential hypertension  -Controlled  - Refill:  - metoprolol tartrate (LOPRESSOR) 50 MG tablet; Take 1 tablet by mouth 2 times daily  Dispense: 60 tablet; Refill: 0  - spironolactone (ALDACTONE) 25 MG tablet; Take 1 tablet by mouth daily  Dispense: 30 tablet; Refill: 4    4. Electrolyte abnormalities  - K2.8, magnesium 1.8  - May be due to increased albuterol use  - Recheck CMP,magnesium    5. Anemia  -Last CBC 5/2022 hemoglobin 9.4. Iron panel showed ferritin 22, elevated iron and iron saturation, normal TIBC. Vitamin B12 and folate WNL peripheral blood smear 2/2022 showed possible thalassemia trait.  - Repeat CBC, iron panel, hemoglobin electrophoresis  - Denies hematochezia, melena. Due for colonoscopy, referral provided. 6.   -Discussed obtaining Pap smear, patient would like to follow-up with myself. Instructed to schedule appointment  -  Colon cancer screening:  Chucho Tellez MD, General Surgery, [place] [address]      Return to Office: Return in about 4 weeks (around 3/17/2023) for pap smear .     Suzanne Strange MD  Case discussed with Dr. Kiah Worley

## 2023-02-17 NOTE — PATIENT INSTRUCTIONS
Our Office is MOVING! [facility], [facility]      With all future appointments starting February 27, 2023,   please see us at our [address]:     [address] [place] [address]. Please call our office with any questions. We look forward to caring for you at our new location. Thank you!

## 2023-02-17 NOTE — PROGRESS NOTES
S: 46 y.o. female here for f/u asthma and skin lesion. Asthma, long covid. Nebulizer 3-4 x/day. Symbicort, singulair. Spiriva. Sees pulm. SK.   Hypokalemia. Hypomagnesemia. O: VS: /73   Pulse (!) 105   Temp 99.1 °F (37.3 °C) (Temporal)   Resp 16   Ht 5' 6.5\" (1.689 m)   Wt 214 lb (97.1 kg)   LMP 02/02/2023   SpO2 99%   BMI 34.02 kg/m²    General: NAD, alert and interacting appropriately. CV:  RRR, no gallops, rubs, or murmurs    Resp: CTAB   Abd:  Soft, nontender   Ext:  No edema   2 mm SK       Impression: asthma, long covid, SK. Hypokalemia. Hypomagnesemia. Plan:   F/u w/ Pulm. Consider xolair to decrease albuterol use which is possibly decreasing K  CTM SK  Bmp, Mag. Replete as needed. Gen surg for cscope  Rtc for pap. Attending Physician Statement  I have discussed the case, including pertinent history and exam findings with the resident. I agree with the documented assessment and plan.

## 2023-06-19 ENCOUNTER — OFFICE VISIT (OUTPATIENT)
Dept: PSYCHOLOGY | Age: 53
End: 2023-06-19
Payer: COMMERCIAL

## 2023-06-19 DIAGNOSIS — F33.1 MAJOR DEPRESSIVE DISORDER, RECURRENT EPISODE, MODERATE WITH ANXIOUS DISTRESS (HCC): Primary | ICD-10-CM

## 2023-06-19 DIAGNOSIS — F33.1 MODERATE EPISODE OF RECURRENT MAJOR DEPRESSIVE DISORDER (HCC): ICD-10-CM

## 2023-06-19 DIAGNOSIS — F41.9 ANXIETY: Primary | ICD-10-CM

## 2023-06-19 PROCEDURE — 90791 PSYCH DIAGNOSTIC EVALUATION: CPT | Performed by: PSYCHOLOGIST

## 2023-06-19 ASSESSMENT — ANXIETY QUESTIONNAIRES
GAD7 TOTAL SCORE: 21
7. FEELING AFRAID AS IF SOMETHING AWFUL MIGHT HAPPEN: 3
3. WORRYING TOO MUCH ABOUT DIFFERENT THINGS: 3
5. BEING SO RESTLESS THAT IT IS HARD TO SIT STILL: 3
6. BECOMING EASILY ANNOYED OR IRRITABLE: 3
2. NOT BEING ABLE TO STOP OR CONTROL WORRYING: 3
IF YOU CHECKED OFF ANY PROBLEMS ON THIS QUESTIONNAIRE, HOW DIFFICULT HAVE THESE PROBLEMS MADE IT FOR YOU TO DO YOUR WORK, TAKE CARE OF THINGS AT HOME, OR GET ALONG WITH OTHER PEOPLE: VERY DIFFICULT
1. FEELING NERVOUS, ANXIOUS, OR ON EDGE: 3
4. TROUBLE RELAXING: 3

## 2023-06-19 ASSESSMENT — PATIENT HEALTH QUESTIONNAIRE - PHQ9
3. TROUBLE FALLING OR STAYING ASLEEP: 3
9. THOUGHTS THAT YOU WOULD BE BETTER OFF DEAD, OR OF HURTING YOURSELF: 0
10. IF YOU CHECKED OFF ANY PROBLEMS, HOW DIFFICULT HAVE THESE PROBLEMS MADE IT FOR YOU TO DO YOUR WORK, TAKE CARE OF THINGS AT HOME, OR GET ALONG WITH OTHER PEOPLE: 3
4. FEELING TIRED OR HAVING LITTLE ENERGY: 3
6. FEELING BAD ABOUT YOURSELF - OR THAT YOU ARE A FAILURE OR HAVE LET YOURSELF OR YOUR FAMILY DOWN: 3
SUM OF ALL RESPONSES TO PHQ QUESTIONS 1-9: 23
SUM OF ALL RESPONSES TO PHQ9 QUESTIONS 1 & 2: 6
1. LITTLE INTEREST OR PLEASURE IN DOING THINGS: 3
2. FEELING DOWN, DEPRESSED OR HOPELESS: 3
SUM OF ALL RESPONSES TO PHQ QUESTIONS 1-9: 23
8. MOVING OR SPEAKING SO SLOWLY THAT OTHER PEOPLE COULD HAVE NOTICED. OR THE OPPOSITE, BEING SO FIGETY OR RESTLESS THAT YOU HAVE BEEN MOVING AROUND A LOT MORE THAN USUAL: 3
5. POOR APPETITE OR OVEREATING: 2
SUM OF ALL RESPONSES TO PHQ QUESTIONS 1-9: 23
SUM OF ALL RESPONSES TO PHQ QUESTIONS 1-9: 23
7. TROUBLE CONCENTRATING ON THINGS, SUCH AS READING THE NEWSPAPER OR WATCHING TELEVISION: 3

## 2023-06-19 ASSESSMENT — COLUMBIA-SUICIDE SEVERITY RATING SCALE - C-SSRS
7. DID THIS OCCUR IN THE LAST THREE MONTHS: NO
2. HAVE YOU ACTUALLY HAD ANY THOUGHTS OF KILLING YOURSELF?: NO
6. HAVE YOU EVER DONE ANYTHING, STARTED TO DO ANYTHING, OR PREPARED TO DO ANYTHING TO END YOUR LIFE?: YES
1. WITHIN THE PAST MONTH, HAVE YOU WISHED YOU WERE DEAD OR WISHED YOU COULD GO TO SLEEP AND NOT WAKE UP?: NO

## 2023-06-19 NOTE — PROGRESS NOTES
Discussed case with psychologist, Dr. Kiley Delarosa. She had an appt with her today for psychotherapy. Discussed that patient would like to restart lexapro 10 mg daily. I had prescribed to her during visit 11/2022. She had not been consistently taking, but would like to start now. She would also like to start hydroxyzine for anxiety. This was also discussed during OV 11/2022. I agree that she can start lexapro and hydroxyzine. Discussed side effects.      Electronically signed by Korina Gibson MD on 6/19/2023 at 4:22 PM

## 2023-06-21 RX ORDER — HYDROXYZINE HYDROCHLORIDE 25 MG/1
25 TABLET, FILM COATED ORAL EVERY 8 HOURS PRN
Qty: 30 TABLET | Refills: 0 | Status: SHIPPED | OUTPATIENT
Start: 2023-06-21 | End: 2023-07-01

## 2023-06-26 ENCOUNTER — OFFICE VISIT (OUTPATIENT)
Dept: PSYCHOLOGY | Age: 53
End: 2023-06-26

## 2023-06-26 DIAGNOSIS — F33.1 MAJOR DEPRESSIVE DISORDER, RECURRENT EPISODE, MODERATE WITH ANXIOUS DISTRESS (HCC): Primary | ICD-10-CM

## 2023-07-17 ENCOUNTER — TELEPHONE (OUTPATIENT)
Dept: FAMILY MEDICINE CLINIC | Age: 53
End: 2023-07-17

## 2023-07-17 NOTE — TELEPHONE ENCOUNTER
Submitted c9 2nd request to [employer] on 7/10/23 for coverage for psychotherapy for MDD F33.1.  had approved condition in April 2023. Received message from [facility] Reina Davidson [phone]) that c9 cannot be authorized, as condition was appealed and later disallowed by [facility] (6/23/23). Notified patient. Pt gave permission to contact her  at [address] [place] [address], [facility], [facility]. Spoke with  Najma Ray who confirmed the case will go to [employer] court, but there is a 60 day filing period. Notified pt there is currently no [facility] coverage for care. Encouraged her to apply for Medicaid. Pt stated she has tried in the recent past, but owes some amount of money to [employer] from a past case in her 25s. Encouraged her to follow back up on this issue with [employer], which she agreed to do. She canceled scheduled appt on 7/13/23, rescheduled for 7/17/23.

## 2023-07-24 ENCOUNTER — TELEPHONE (OUTPATIENT)
Dept: FAMILY MEDICINE CLINIC | Age: 53
End: 2023-07-24

## 2023-07-24 NOTE — TELEPHONE ENCOUNTER
Called pt and discussed iron studies and other results with her. Is still trying to schedule with OBGYN. Reminded of hosp f/u with me on Friday. Pt BIB [facility] c/o abdominal pain x1 week. Reports +n/v/d. States had fever up to 100.3 last week. +productive green sputum. Denies urinary complaints/cp/sob. Feels dizzy. Hx of asthma, no abdominal surgeries. LMP 07/24/2023.

## 2023-07-24 NOTE — TELEPHONE ENCOUNTER
----- Message from Lester Duncan sent at 7/24/2023 10:45 AM EDT -----  Subject: Message to Provider    QUESTIONS  Information for Provider? Patient canceled 7/24/23 appointment. Would like   to reschedule but unable to reschedule at this moment due to Insurance   coverage. ---------------------------------------------------------------------------  --------------  Michelle ESCOBAR  [phone]; OK to leave message on voicemail  ---------------------------------------------------------------------------  --------------  SCRIPT ANSWERS  Relationship to Patient?  Self

## 2023-07-25 DIAGNOSIS — I10 ESSENTIAL HYPERTENSION: ICD-10-CM

## 2023-07-25 RX ORDER — METOPROLOL TARTRATE 50 MG/1
50 TABLET, FILM COATED ORAL 2 TIMES DAILY
Qty: 60 TABLET | Refills: 0 | Status: SHIPPED | OUTPATIENT
Start: 2023-07-25

## 2023-09-15 RX ORDER — POTASSIUM CHLORIDE 20 MEQ/1
20 TABLET, EXTENDED RELEASE ORAL DAILY
Qty: 60 TABLET | OUTPATIENT
Start: 2023-09-15

## 2023-10-13 DIAGNOSIS — U09.9 CHRONIC POST-COVID-19 SYNDROME: ICD-10-CM

## 2023-10-13 DIAGNOSIS — I10 ESSENTIAL HYPERTENSION: ICD-10-CM

## 2023-10-13 RX ORDER — METOPROLOL TARTRATE 50 MG/1
50 TABLET, FILM COATED ORAL 2 TIMES DAILY
Qty: 60 TABLET | Refills: 0 | Status: CANCELLED | OUTPATIENT
Start: 2023-10-13

## 2023-10-13 RX ORDER — METOPROLOL TARTRATE 50 MG/1
50 TABLET, FILM COATED ORAL 2 TIMES DAILY
Qty: 10 TABLET | Refills: 0 | Status: CANCELLED | OUTPATIENT
Start: 2023-10-13

## 2023-10-13 NOTE — TELEPHONE ENCOUNTER
Last Appointment:  2/17/2023  Future Appointments   Date Time Provider [address]  [facility]   10/17/2023  3:00 PM MD Sandy Ortega [facility]

## 2023-10-13 NOTE — TELEPHONE ENCOUNTER
Last Appointment:  2/17/2023  Future Appointments   Date Time Provider [address]   10/17/2023  3:00 PM MD Yair Ugalde [facility]

## 2023-10-17 ENCOUNTER — OFFICE VISIT (OUTPATIENT)
Dept: FAMILY MEDICINE CLINIC | Age: 53
End: 2023-10-17

## 2023-10-17 VITALS
OXYGEN SATURATION: 98 % | BODY MASS INDEX: 36 KG/M2 | HEART RATE: 122 BPM | HEIGHT: 66 IN | WEIGHT: 224 LBS | DIASTOLIC BLOOD PRESSURE: 116 MMHG | RESPIRATION RATE: 16 BRPM | SYSTOLIC BLOOD PRESSURE: 158 MMHG | TEMPERATURE: 97.4 F

## 2023-10-17 DIAGNOSIS — I10 ESSENTIAL HYPERTENSION: Primary | ICD-10-CM

## 2023-10-17 DIAGNOSIS — F33.1 MODERATE EPISODE OF RECURRENT MAJOR DEPRESSIVE DISORDER (HCC): ICD-10-CM

## 2023-10-17 DIAGNOSIS — R53.83 OTHER FATIGUE: ICD-10-CM

## 2023-10-17 DIAGNOSIS — J45.40 MODERATE PERSISTENT ASTHMA WITHOUT COMPLICATION: ICD-10-CM

## 2023-10-17 DIAGNOSIS — R00.0 TACHYCARDIA: ICD-10-CM

## 2023-10-17 DIAGNOSIS — F41.9 ANXIETY: ICD-10-CM

## 2023-10-17 LAB
ALBUMIN SERPL-MCNC: 3.9 G/DL (ref 3.5–5.2)
ALP BLD-CCNC: 105 U/L (ref 35–104)
ALT SERPL-CCNC: 26 U/L (ref 0–32)
ANION GAP SERPL CALCULATED.3IONS-SCNC: 16 MMOL/L (ref 7–16)
AST SERPL-CCNC: 54 U/L (ref 0–31)
BASOPHILS ABSOLUTE: 0.11 K/UL (ref 0–0.2)
BASOPHILS RELATIVE PERCENT: 1 % (ref 0–2)
BILIRUB SERPL-MCNC: 0.2 MG/DL (ref 0–1.2)
BUN BLDV-MCNC: 5 MG/DL (ref 6–20)
CALCIUM SERPL-MCNC: 9 MG/DL (ref 8.6–10.2)
CHLORIDE BLD-SCNC: 98 MMOL/L (ref 98–107)
CO2: 16 MMOL/L (ref 22–29)
CREAT SERPL-MCNC: 0.8 MG/DL (ref 0.5–1)
EOSINOPHILS ABSOLUTE: 0.42 K/UL (ref 0.05–0.5)
EOSINOPHILS RELATIVE PERCENT: 4 % (ref 0–6)
GFR SERPL CREATININE-BSD FRML MDRD: >60 ML/MIN/1.73M2
GLUCOSE BLD-MCNC: 132 MG/DL (ref 74–99)
HCT VFR BLD CALC: 37.5 % (ref 34–48)
HEMOGLOBIN: 11.2 G/DL (ref 11.5–15.5)
LYMPHOCYTES ABSOLUTE: 3.16 K/UL (ref 1.5–4)
LYMPHOCYTES RELATIVE PERCENT: 26 % (ref 20–42)
MCH RBC QN AUTO: 22 PG (ref 26–35)
MCHC RBC AUTO-ENTMCNC: 29.9 G/DL (ref 32–34.5)
MCV RBC AUTO: 73.5 FL (ref 80–99.9)
MONOCYTES ABSOLUTE: 1.58 K/UL (ref 0.1–0.95)
MONOCYTES RELATIVE PERCENT: 13 % (ref 2–12)
NEUTROPHILS ABSOLUTE: 6.74 K/UL (ref 1.8–7.3)
NEUTROPHILS RELATIVE PERCENT: 56 % (ref 43–80)
PDW BLD-RTO: 23.3 % (ref 11.5–15)
PLATELET # BLD: 615 K/UL (ref 130–450)
PMV BLD AUTO: 9.5 FL (ref 7–12)
POTASSIUM SERPL-SCNC: 4.2 MMOL/L (ref 3.5–5)
RBC # BLD: 5.1 M/UL (ref 3.5–5.5)
RBC # BLD: ABNORMAL 10*6/UL
SODIUM BLD-SCNC: 130 MMOL/L (ref 132–146)
TOTAL PROTEIN: 8.3 G/DL (ref 6.4–8.3)
TSH SERPL DL<=0.05 MIU/L-ACNC: 3.99 UIU/ML (ref 0.27–4.2)
WBC # BLD: 12 K/UL (ref 4.5–11.5)

## 2023-10-17 PROCEDURE — 36415 COLL VENOUS BLD VENIPUNCTURE: CPT | Performed by: FAMILY MEDICINE

## 2023-10-17 PROCEDURE — 3078F DIAST BP <80 MM HG: CPT | Performed by: STUDENT IN AN ORGANIZED HEALTH CARE EDUCATION/TRAINING PROGRAM

## 2023-10-17 PROCEDURE — 99213 OFFICE O/P EST LOW 20 MIN: CPT | Performed by: STUDENT IN AN ORGANIZED HEALTH CARE EDUCATION/TRAINING PROGRAM

## 2023-10-17 PROCEDURE — 3074F SYST BP LT 130 MM HG: CPT | Performed by: STUDENT IN AN ORGANIZED HEALTH CARE EDUCATION/TRAINING PROGRAM

## 2023-10-17 PROCEDURE — 93000 ELECTROCARDIOGRAM COMPLETE: CPT | Performed by: STUDENT IN AN ORGANIZED HEALTH CARE EDUCATION/TRAINING PROGRAM

## 2023-10-17 RX ORDER — ESCITALOPRAM OXALATE 10 MG/1
10 TABLET ORAL DAILY
Qty: 30 TABLET | Refills: 3 | Status: SHIPPED | OUTPATIENT
Start: 2023-10-17

## 2023-10-17 RX ORDER — METOPROLOL TARTRATE 50 MG/1
50 TABLET, FILM COATED ORAL 2 TIMES DAILY
Qty: 60 TABLET | Refills: 0 | Status: SHIPPED | OUTPATIENT
Start: 2023-10-17

## 2023-10-17 RX ORDER — MONTELUKAST SODIUM 10 MG/1
10 TABLET ORAL DAILY
Qty: 30 TABLET | Refills: 3 | Status: SHIPPED | OUTPATIENT
Start: 2023-10-17

## 2023-10-17 RX ORDER — SPIRONOLACTONE 25 MG/1
25 TABLET ORAL DAILY
Qty: 30 TABLET | Refills: 4 | Status: SHIPPED | OUTPATIENT
Start: 2023-10-17

## 2023-10-17 RX ORDER — TIOTROPIUM BROMIDE 18 UG/1
18 CAPSULE ORAL; RESPIRATORY (INHALATION) DAILY
Qty: 90 CAPSULE | Refills: 1 | Status: SHIPPED | OUTPATIENT
Start: 2023-10-17

## 2023-10-17 ASSESSMENT — COLUMBIA-SUICIDE SEVERITY RATING SCALE - C-SSRS
7. DID THIS OCCUR IN THE LAST THREE MONTHS: NO
3. HAVE YOU BEEN THINKING ABOUT HOW YOU MIGHT KILL YOURSELF?: NO
1. WITHIN THE PAST MONTH, HAVE YOU WISHED YOU WERE DEAD OR WISHED YOU COULD GO TO SLEEP AND NOT WAKE UP?: NO
BASED ON RESPONSES TO C-SSRS QS 1-6, WHAT IS THE PATIENT'S OVERALL RISK RATING FOR SUICIDE: NO RISK
2. HAVE YOU ACTUALLY HAD ANY THOUGHTS OF KILLING YOURSELF?: NO
6. HAVE YOU EVER DONE ANYTHING, STARTED TO DO ANYTHING, OR PREPARED TO DO ANYTHING TO END YOUR LIFE?: NO
5. HAVE YOU STARTED TO WORK OUT OR WORKED OUT THE DETAILS OF HOW TO KILL YOURSELF? DO YOU INTEND TO CARRY OUT THIS PLAN?: NO
4. HAVE YOU HAD THESE THOUGHTS AND HAD SOME INTENTION OF ACTING ON THEM?: NO

## 2023-10-17 ASSESSMENT — PATIENT HEALTH QUESTIONNAIRE - PHQ9
SUM OF ALL RESPONSES TO PHQ9 QUESTIONS 1 & 2: 6
10. IF YOU CHECKED OFF ANY PROBLEMS, HOW DIFFICULT HAVE THESE PROBLEMS MADE IT FOR YOU TO DO YOUR WORK, TAKE CARE OF THINGS AT HOME, OR GET ALONG WITH OTHER PEOPLE: 3
SUM OF ALL RESPONSES TO PHQ QUESTIONS 1-9: 22
8. MOVING OR SPEAKING SO SLOWLY THAT OTHER PEOPLE COULD HAVE NOTICED. OR THE OPPOSITE, BEING SO FIGETY OR RESTLESS THAT YOU HAVE BEEN MOVING AROUND A LOT MORE THAN USUAL: 1
1. LITTLE INTEREST OR PLEASURE IN DOING THINGS: 3
4. FEELING TIRED OR HAVING LITTLE ENERGY: 3
6. FEELING BAD ABOUT YOURSELF - OR THAT YOU ARE A FAILURE OR HAVE LET YOURSELF OR YOUR FAMILY DOWN: 3
SUM OF ALL RESPONSES TO PHQ QUESTIONS 1-9: 22
5. POOR APPETITE OR OVEREATING: 3
7. TROUBLE CONCENTRATING ON THINGS, SUCH AS READING THE NEWSPAPER OR WATCHING TELEVISION: 3
SUM OF ALL RESPONSES TO PHQ QUESTIONS 1-9: 22
SUM OF ALL RESPONSES TO PHQ QUESTIONS 1-9: 22
2. FEELING DOWN, DEPRESSED OR HOPELESS: 3
9. THOUGHTS THAT YOU WOULD BE BETTER OFF DEAD, OR OF HURTING YOURSELF: 0
3. TROUBLE FALLING OR STAYING ASLEEP: 3

## 2023-10-17 NOTE — PROGRESS NOTES
[facility]  FAMILY MEDICINE RESIDENCY PROGRAM  DATE OF VISIT : 10/17/2023    Patient : Raquel Yuan   Age : 48 y.o.  : 1970   MRN : [id number]   ______________________________________________________________________    Chief Complaint :   Chief Complaint   Patient presents with    Hypertension     Patient presents today for a follow up on HTN. HPI : Raquel Yuan is 48 y.o. female who presented to the clinic today for htn follow up. BP elevated in office today. On lopressor 50 mg daily and aldactone 25 mg daily, but has been out of her meds. Did not realize she was supposed to take metoprolol twice a day. Denies CP, SOB, lightheadedness/dizziness. Does not check BP at home. Endorses palpitations occasionally. States that she continues to feel anxious about doing anything. PHQ-9 22. Denies SI/HI. Was unable to continue counseling with Dr. Ananda Maxwell due to cost. Has been on lexapro and other meds in the past, but did not tolerate them due to side effects. She states that she did not give it enough time to work. Patient's medications, allergies, past medical, surgical, social and family histories were reviewed and updated as appropriate. Past Medical History :  Past Medical History:   Diagnosis Date    Hypertension     Long COVID      History reviewed. No pertinent surgical history. Allergies :    Allergies   Allergen Reactions    Penicillins Hives and Itching       Medication List :    Current Outpatient Medications   Medication Sig Dispense Refill    metoprolol tartrate (LOPRESSOR) 50 MG tablet Take 1 tablet by mouth 2 times daily 60 tablet 0    spironolactone (ALDACTONE) 25 MG tablet Take 1 tablet by mouth daily 30 tablet 4    montelukast (SINGULAIR) 10 MG tablet Take 1 tablet by mouth daily 30 tablet 3    albuterol (PROVENTIL) (5 MG/ML) 0.5% nebulizer solution Take 1 mL by nebulization 4 times daily as needed for Wheezing 120 each 3    escitalopram (LEXAPRO) 10

## 2023-10-19 RX ORDER — ALBUTEROL SULFATE 90 UG/1
2 AEROSOL, METERED RESPIRATORY (INHALATION)
Qty: 1 EACH | Refills: 3 | Status: SHIPPED | OUTPATIENT
Start: 2023-10-19

## 2023-10-21 DIAGNOSIS — E87.1 HYPONATREMIA: Primary | ICD-10-CM

## 2023-10-24 NOTE — RESULT ENCOUNTER NOTE
Spoke with patient informed that sodium came back a bit low. Informed that further lab testing is needed. Informed patient that orders are placed.

## 2023-12-23 DIAGNOSIS — I10 ESSENTIAL HYPERTENSION: ICD-10-CM

## 2023-12-27 RX ORDER — METOPROLOL TARTRATE 50 MG/1
50 TABLET, FILM COATED ORAL 2 TIMES DAILY
Qty: 60 TABLET | Refills: 0 | Status: SHIPPED | OUTPATIENT
Start: 2023-12-27

## 2024-02-24 DIAGNOSIS — I10 ESSENTIAL HYPERTENSION: ICD-10-CM

## 2024-02-26 RX ORDER — METOPROLOL TARTRATE 50 MG/1
50 TABLET, FILM COATED ORAL 2 TIMES DAILY
Qty: 60 TABLET | Refills: 0 | OUTPATIENT
Start: 2024-02-26

## 2024-02-29 ENCOUNTER — OFFICE VISIT (OUTPATIENT)
Dept: FAMILY MEDICINE CLINIC | Age: 54
End: 2024-02-29

## 2024-02-29 VITALS
HEIGHT: 66 IN | BODY MASS INDEX: 36.16 KG/M2 | RESPIRATION RATE: 18 BRPM | DIASTOLIC BLOOD PRESSURE: 87 MMHG | TEMPERATURE: 97.2 F | SYSTOLIC BLOOD PRESSURE: 158 MMHG | WEIGHT: 225 LBS | OXYGEN SATURATION: 98 % | HEART RATE: 79 BPM

## 2024-02-29 DIAGNOSIS — R73.09 ABNORMAL GLUCOSE: ICD-10-CM

## 2024-02-29 DIAGNOSIS — E66.01 SEVERE OBESITY (BMI 35.0-39.9) WITH COMORBIDITY (HCC): ICD-10-CM

## 2024-02-29 DIAGNOSIS — F41.9 ANXIETY: ICD-10-CM

## 2024-02-29 DIAGNOSIS — I10 ESSENTIAL HYPERTENSION: Primary | ICD-10-CM

## 2024-02-29 DIAGNOSIS — F33.1 MODERATE EPISODE OF RECURRENT MAJOR DEPRESSIVE DISORDER (HCC): ICD-10-CM

## 2024-02-29 DIAGNOSIS — Z12.31 ENCOUNTER FOR SCREENING MAMMOGRAM FOR MALIGNANT NEOPLASM OF BREAST: ICD-10-CM

## 2024-02-29 DIAGNOSIS — J45.40 MODERATE PERSISTENT ASTHMA WITHOUT COMPLICATION: ICD-10-CM

## 2024-02-29 DIAGNOSIS — Z59.89 DOES NOT HAVE HEALTH INSURANCE: ICD-10-CM

## 2024-02-29 DIAGNOSIS — Z59.9 FINANCIAL DIFFICULTY: ICD-10-CM

## 2024-02-29 DIAGNOSIS — E78.2 MIXED HYPERLIPIDEMIA: ICD-10-CM

## 2024-02-29 DIAGNOSIS — Z76.0 MEDICATION REFILL: ICD-10-CM

## 2024-02-29 LAB
ALBUMIN SERPL-MCNC: 3.8 G/DL (ref 3.5–5.2)
ALP BLD-CCNC: 115 U/L (ref 35–104)
ALT SERPL-CCNC: 17 U/L (ref 0–32)
ANION GAP SERPL CALCULATED.3IONS-SCNC: 15 MMOL/L (ref 7–16)
AST SERPL-CCNC: 37 U/L (ref 0–31)
BILIRUB SERPL-MCNC: 0.5 MG/DL (ref 0–1.2)
BUN BLDV-MCNC: 7 MG/DL (ref 6–20)
CALCIUM SERPL-MCNC: 9.2 MG/DL (ref 8.6–10.2)
CHLORIDE BLD-SCNC: 93 MMOL/L (ref 98–107)
CHOLESTEROL: 258 MG/DL
CO2: 20 MMOL/L (ref 22–29)
CREAT SERPL-MCNC: 0.9 MG/DL (ref 0.5–1)
GFR SERPL CREATININE-BSD FRML MDRD: >60 ML/MIN/1.73M2
GLUCOSE BLD-MCNC: 123 MG/DL (ref 74–99)
HBA1C MFR BLD: 6.1 %
HCT VFR BLD CALC: 41.9 % (ref 34–48)
HDLC SERPL-MCNC: 31 MG/DL
HEMOGLOBIN: 12.4 G/DL (ref 11.5–15.5)
LDL CHOLESTEROL: 207 MG/DL
MCH RBC QN AUTO: 22.5 PG (ref 26–35)
MCHC RBC AUTO-ENTMCNC: 29.6 G/DL (ref 32–34.5)
MCV RBC AUTO: 75.9 FL (ref 80–99.9)
PDW BLD-RTO: 21.4 % (ref 11.5–15)
PLATELET # BLD: 589 K/UL (ref 130–450)
PMV BLD AUTO: 9.6 FL (ref 7–12)
POTASSIUM SERPL-SCNC: 5.3 MMOL/L (ref 3.5–5)
RBC # BLD: 5.52 M/UL (ref 3.5–5.5)
SODIUM BLD-SCNC: 128 MMOL/L (ref 132–146)
TOTAL PROTEIN: 8.3 G/DL (ref 6.4–8.3)
TRIGL SERPL-MCNC: 101 MG/DL
VLDLC SERPL CALC-MCNC: 20 MG/DL
WBC # BLD: 14 K/UL (ref 4.5–11.5)

## 2024-02-29 PROCEDURE — 3077F SYST BP >= 140 MM HG: CPT

## 2024-02-29 PROCEDURE — 36415 COLL VENOUS BLD VENIPUNCTURE: CPT | Performed by: FAMILY MEDICINE

## 2024-02-29 PROCEDURE — 3079F DIAST BP 80-89 MM HG: CPT

## 2024-02-29 PROCEDURE — 83036 HEMOGLOBIN GLYCOSYLATED A1C: CPT

## 2024-02-29 PROCEDURE — 99213 OFFICE O/P EST LOW 20 MIN: CPT

## 2024-02-29 RX ORDER — METOPROLOL TARTRATE 50 MG/1
50 TABLET, FILM COATED ORAL 2 TIMES DAILY
Qty: 180 TABLET | Refills: 0 | Status: SHIPPED | OUTPATIENT
Start: 2024-02-29 | End: 2024-05-29

## 2024-02-29 RX ORDER — ALBUTEROL SULFATE 90 UG/1
2 AEROSOL, METERED RESPIRATORY (INHALATION)
Qty: 1 EACH | Refills: 3 | Status: SHIPPED | OUTPATIENT
Start: 2024-02-29

## 2024-02-29 RX ORDER — ESCITALOPRAM OXALATE 10 MG/1
10 TABLET ORAL DAILY
Qty: 90 TABLET | Refills: 0 | Status: SHIPPED | OUTPATIENT
Start: 2024-02-29 | End: 2024-05-29

## 2024-02-29 RX ORDER — SPIRONOLACTONE 25 MG/1
25 TABLET ORAL DAILY
Qty: 30 TABLET | Refills: 4 | Status: SHIPPED | OUTPATIENT
Start: 2024-02-29

## 2024-02-29 SDOH — ECONOMIC STABILITY: FOOD INSECURITY: WITHIN THE PAST 12 MONTHS, YOU WORRIED THAT YOUR FOOD WOULD RUN OUT BEFORE YOU GOT MONEY TO BUY MORE.: OFTEN TRUE

## 2024-02-29 SDOH — ECONOMIC STABILITY: FOOD INSECURITY: WITHIN THE PAST 12 MONTHS, THE FOOD YOU BOUGHT JUST DIDN'T LAST AND YOU DIDN'T HAVE MONEY TO GET MORE.: OFTEN TRUE

## 2024-02-29 SDOH — ECONOMIC STABILITY - INCOME SECURITY: PROBLEM RELATED TO HOUSING AND ECONOMIC CIRCUMSTANCES, UNSPECIFIED: Z59.9

## 2024-02-29 SDOH — ECONOMIC STABILITY - INCOME SECURITY: OTHER PROBLEMS RELATED TO HOUSING AND ECONOMIC CIRCUMSTANCES: Z59.89

## 2024-02-29 SDOH — ECONOMIC STABILITY: INCOME INSECURITY: HOW HARD IS IT FOR YOU TO PAY FOR THE VERY BASICS LIKE FOOD, HOUSING, MEDICAL CARE, AND HEATING?: VERY HARD

## 2024-02-29 ASSESSMENT — PATIENT HEALTH QUESTIONNAIRE - PHQ9
SUM OF ALL RESPONSES TO PHQ QUESTIONS 1-9: 21
4. FEELING TIRED OR HAVING LITTLE ENERGY: 3
5. POOR APPETITE OR OVEREATING: 3
9. THOUGHTS THAT YOU WOULD BE BETTER OFF DEAD, OR OF HURTING YOURSELF: 0
8. MOVING OR SPEAKING SO SLOWLY THAT OTHER PEOPLE COULD HAVE NOTICED. OR THE OPPOSITE, BEING SO FIGETY OR RESTLESS THAT YOU HAVE BEEN MOVING AROUND A LOT MORE THAN USUAL: 0
SUM OF ALL RESPONSES TO PHQ QUESTIONS 1-9: 21
2. FEELING DOWN, DEPRESSED OR HOPELESS: 3
SUM OF ALL RESPONSES TO PHQ9 QUESTIONS 1 & 2: 6
SUM OF ALL RESPONSES TO PHQ QUESTIONS 1-9: 21
10. IF YOU CHECKED OFF ANY PROBLEMS, HOW DIFFICULT HAVE THESE PROBLEMS MADE IT FOR YOU TO DO YOUR WORK, TAKE CARE OF THINGS AT HOME, OR GET ALONG WITH OTHER PEOPLE: 2
7. TROUBLE CONCENTRATING ON THINGS, SUCH AS READING THE NEWSPAPER OR WATCHING TELEVISION: 3
1. LITTLE INTEREST OR PLEASURE IN DOING THINGS: 3
SUM OF ALL RESPONSES TO PHQ QUESTIONS 1-9: 21
6. FEELING BAD ABOUT YOURSELF - OR THAT YOU ARE A FAILURE OR HAVE LET YOURSELF OR YOUR FAMILY DOWN: 3
3. TROUBLE FALLING OR STAYING ASLEEP: 3

## 2024-02-29 ASSESSMENT — COLUMBIA-SUICIDE SEVERITY RATING SCALE - C-SSRS
6. HAVE YOU EVER DONE ANYTHING, STARTED TO DO ANYTHING, OR PREPARED TO DO ANYTHING TO END YOUR LIFE?: NO
1. WITHIN THE PAST MONTH, HAVE YOU WISHED YOU WERE DEAD OR WISHED YOU COULD GO TO SLEEP AND NOT WAKE UP?: NO
2. HAVE YOU ACTUALLY HAD ANY THOUGHTS OF KILLING YOURSELF?: NO

## 2024-02-29 NOTE — PROGRESS NOTES
S: Sushma Perze 53 y.o. female  here for encounter medication refill.    Medical conditions include HTN, hyperlipidemia, asthma, anxiety/depression.    Hypertension:  BP elevated because she ran out of meds.  Meds include metoprolol and spironolactone. No S/S hypertension or hypotension.  Needs new home BP cuff.    Hyperlipidemia:  No medications at this time.  Last lipid profile 2021:    Lab Results   Component Value Date    CHOL 235 (H) 03/19/2021    CHOL 228 (H) 03/16/2011     Lab Results   Component Value Date    TRIG 95 03/19/2021    TRIG 170 (H) 03/16/2011     Lab Results   Component Value Date    HDL 79 03/19/2021    .2 03/16/2011     Lab Results   Component Value Date    LDLCALC 137 (H) 03/19/2021    LDLCALC 81 03/16/2011     The 10-year ASCVD risk score (Kellie SALAS, et al., 2019) is: 7.1%    Values used to calculate the score:      Age: 53 years      Sex: Female      Is Non- : Yes      Diabetic: No      Tobacco smoker: No      Systolic Blood Pressure: 158 mmHg      Is BP treated: Yes      HDL Cholesterol: 79 mg/dL      Total Cholesterol: 235 mg/dL     All values are out of date.  Due for updated lab work; treatment based on recent values.    Asthma:  Symbicort and Albuterol (nebulizer and inhaler) and Spiriva. Denies S/S asthma exacerbation.    Anxiety/Depression:  Escitalopram 10 mg/day prescribed but non compliant with daily use. PHQ-9=21 without SI/HI or psychosis.  No CBT due to lack of health insurance; interested in restarting counseling    Health Maintenance:  Due for mammography, diabetic screening.  Declines colon cancer screening at this.    O: VS: BP (!) 158/87 (Site: Left Upper Arm, Position: Sitting, Cuff Size: Large Adult)   Pulse 79   Temp 97.2 °F (36.2 °C) (Temporal)   Resp 18   Ht 1.676 m (5' 6\")   Wt 102.1 kg (225 lb)   SpO2 98%   BMI 36.32 kg/m²    General: NAD              HEENT: WDL              Neck: WDL   CV:  RRR, no gallops, rubs, or 
glucose  - POCT glycosylated hemoglobin (Hb A1C)    7. Moderate persistent asthma without complication  -Continue Symbicort, Albuterol inhaler PRN, Albuterol Nebs, Singulair, and Spiriva.   -Advised to schedule with Pulm once insurance back.     8. Moderate episode of recurrent major depressive disorder (HCC)  -PHQ Score 21. No SI or HI.  -Advised to use Lexapro as prescribed. Informed it'll take up to 4 weeks to take effect.  -Order Comprehensive Metabolic Panel and CBC  - escitalopram (LEXAPRO) 10 MG tablet; Take 1 tablet by mouth daily  Dispense: 90 tablet; Refill: 0  -Patient to establish with counselor again once insurance back     9. Financial difficulty      Does not have health insurance  - [facility] Referral to Social Work (Primary Care Only)  -Patient reports that she does not have health insurance nor a job at this point in time. As a result she is having hardships with buying food, paying bills, and going to specialists. Patient does have secure housing; however, has been using her savings to manage payments. Patient is in search of work, but reports it has been difficult.      10. Medication refill  - albuterol (PROVENTIL) (5 MG/ML) 0.5% nebulizer solution; Take 1 mL by nebulization 4 times daily as needed for Wheezing  Dispense: 120 each; Refill: 3  - albuterol sulfate HFA (VENTOLIN HFA) 108 (90 Base) MCG/ACT inhaler; Inhale 2 puffs into the lungs every 4-6 hours as needed for Wheezing or Shortness of Breath  Dispense: 1 each; Refill: 3  - Blood Pressure Monitoring (COMFORT TOUCH BP CUFF/MEDIUM) MISC; Check Blood pressure daily  Dispense: 1 each; Refill: 0  - escitalopram (LEXAPRO) 10 MG tablet; Take 1 tablet by mouth daily  Dispense: 90 tablet; Refill: 0  - metoprolol tartrate (LOPRESSOR) 50 MG tablet; Take 1 tablet by mouth 2 times daily  Dispense: 180 tablet; Refill: 0  - spironolactone (ALDACTONE) 25 MG tablet; Take 1 tablet by mouth daily  Dispense: 30 tablet; Refill: 4    Return to Office: Return in

## 2024-02-29 NOTE — PATIENT INSTRUCTIONS
FOOD ASSISTANCE   Help Network of [facility]  Text the words “HELP NETWORK” to : 521205   At the prompt grazyna “1” for information about food pantries or “2” to learn about free meal sites   Website:https://www.helpnetworkneo.org/  [facility] and [facility] [phone]  [facility] [phone]  [facility] [place], [facility] [place] [place] [phone]  [place] [phone]    [facility]   [address] [place] [address]  Hours: Mon-Sat 6am-1:30PM Sun Closed  Phone: [phone]    [facility]  [address] [place] [phone]  Two full meals are served daily to the public at our Emergency Shelter located at [address] [place] [address] in [place].  Breakfast: 6:15 a.m.-7:15 a.m.  Dinner: 6:00 p.m.-7:00 p.m. All are welcome. No proof of income or ID is required.      [facility]  Meals on Wheels of [facility]   [address] [place] [address]   Phone: [phone]   Hot meal and sack lunch (for evening meal) Monday-Friday to residents of [facility]. Cost for weekly meals, discounted for two people.         [facility] Mobile Meals   [address] [place] [address]   Phone: [phone]   Meal delivery options for residents of [facility], [facility], [facility]. Delivered Monday-Friday. Frozen meals for Saturday and Sunday delivered on Friday. Cost for regular and special diet meals.     [employer] [facility]  [address] [address]   Phone: [phone]   Delivers hot meals to homebound individuals living in the [place] town[place] and [facility]. Monday-Friday. If eligible and funds available, two frozen meals for Saturday and Sunday.     [facility]   [address] [place] [address]   Phone:

## 2024-03-01 ENCOUNTER — CARE COORDINATION (OUTPATIENT)
Dept: CARE COORDINATION | Age: 54
End: 2024-03-01

## 2024-03-01 NOTE — CARE COORDINATION
SWCC made call to pt to initiate SW referral, unable to reach, left message requesting call back to this CC.    Carlotta Burris MSW, LSW   Care Coordinator  [phone]

## 2024-03-05 ENCOUNTER — CARE COORDINATION (OUTPATIENT)
Dept: CARE COORDINATION | Age: 54
End: 2024-03-05

## 2024-03-05 NOTE — CARE COORDINATION
SWCC made call to pt, to initiate SW referral, unable to reach, left message requesting call back to this CC.    Carlotta Burris MSW, LSW   Care Coordinator  [phone]

## 2024-03-07 ENCOUNTER — TELEPHONE (OUTPATIENT)
Dept: FAMILY MEDICINE CLINIC | Age: 54
End: 2024-03-07

## 2024-03-07 NOTE — TELEPHONE ENCOUNTER
Pharmacy called to state that Albuterol 0.5% is on back order.  What is in stock is 0.083%.  Please advise.

## 2024-03-07 NOTE — TELEPHONE ENCOUNTER
----- Message from Mavis Sanabria sent at 3/7/2024  1:13 PM EST -----  Subject: Message to Provider    QUESTIONS  Information for Provider? Kayla cullen s calling in regards to a medical record   request that was emailed 1/4/2024. Please call back fax number [phone]  ---------------------------------------------------------------------------  --------------  CALL BACK INFO  [phone]; OK to leave message on voicemail  ---------------------------------------------------------------------------  --------------  SCRIPT ANSWERS  Relationship to Patient? Covered Entity  Covered Entity Type? Other  Other Covered Entity Type? Release point   Representative Name? Kayla

## 2024-03-11 ENCOUNTER — CARE COORDINATION (OUTPATIENT)
Dept: CARE COORDINATION | Age: 54
End: 2024-03-11

## 2024-03-11 RX ORDER — ALBUTEROL SULFATE 2.5 MG/3ML
2.5 SOLUTION RESPIRATORY (INHALATION) 4 TIMES DAILY PRN
Qty: 120 EACH | Refills: 0 | Status: SHIPPED | OUTPATIENT
Start: 2024-03-11

## 2024-03-11 NOTE — CARE COORDINATION
SWCC made call to pt to initiate SW referral, unable to reach, left message requesting call back. If no return call, CC to sign off.    Carlotta ADAIR, LSW   Care Coordinator  [phone]

## 2024-05-19 DIAGNOSIS — E78.2 MIXED HYPERLIPIDEMIA: Primary | ICD-10-CM

## 2024-05-19 RX ORDER — ATORVASTATIN CALCIUM 40 MG/1
40 TABLET, FILM COATED ORAL DAILY
Qty: 90 TABLET | Refills: 0 | Status: SHIPPED | OUTPATIENT
Start: 2024-05-19 | End: 2024-08-17

## 2024-08-22 ENCOUNTER — OFFICE VISIT (OUTPATIENT)
Dept: FAMILY MEDICINE CLINIC | Age: 54
End: 2024-08-22

## 2024-08-22 ENCOUNTER — TELEPHONE (OUTPATIENT)
Dept: FAMILY MEDICINE CLINIC | Age: 54
End: 2024-08-22

## 2024-08-22 VITALS
WEIGHT: 223 LBS | RESPIRATION RATE: 18 BRPM | HEART RATE: 84 BPM | DIASTOLIC BLOOD PRESSURE: 81 MMHG | OXYGEN SATURATION: 97 % | TEMPERATURE: 97.2 F | SYSTOLIC BLOOD PRESSURE: 131 MMHG | BODY MASS INDEX: 35.84 KG/M2 | HEIGHT: 66 IN

## 2024-08-22 DIAGNOSIS — E78.2 MIXED HYPERLIPIDEMIA: ICD-10-CM

## 2024-08-22 DIAGNOSIS — Z76.0 MEDICATION REFILL: ICD-10-CM

## 2024-08-22 DIAGNOSIS — R71.8 MICROCYTOSIS: ICD-10-CM

## 2024-08-22 DIAGNOSIS — F41.9 ANXIETY: ICD-10-CM

## 2024-08-22 DIAGNOSIS — Z12.11 COLON CANCER SCREENING: ICD-10-CM

## 2024-08-22 DIAGNOSIS — F33.1 MODERATE EPISODE OF RECURRENT MAJOR DEPRESSIVE DISORDER (HCC): ICD-10-CM

## 2024-08-22 DIAGNOSIS — I10 PRIMARY HYPERTENSION: Primary | ICD-10-CM

## 2024-08-22 DIAGNOSIS — R73.03 PREDIABETES: ICD-10-CM

## 2024-08-22 DIAGNOSIS — I10 ESSENTIAL HYPERTENSION: ICD-10-CM

## 2024-08-22 LAB
ALBUMIN: 3.5 G/DL (ref 3.5–5.2)
ALP BLD-CCNC: 107 U/L (ref 35–104)
ALT SERPL-CCNC: 16 U/L (ref 0–32)
ANION GAP SERPL CALCULATED.3IONS-SCNC: 16 MMOL/L (ref 7–16)
AST SERPL-CCNC: 27 U/L (ref 0–31)
BILIRUB SERPL-MCNC: 0.5 MG/DL (ref 0–1.2)
BUN BLDV-MCNC: 6 MG/DL (ref 6–20)
CALCIUM SERPL-MCNC: 9.3 MG/DL (ref 8.6–10.2)
CHLORIDE BLD-SCNC: 97 MMOL/L (ref 98–107)
CO2: 18 MMOL/L (ref 22–29)
CREAT SERPL-MCNC: 0.8 MG/DL (ref 0.5–1)
FERRITIN: 17 NG/ML
GFR, ESTIMATED: 85 ML/MIN/1.73M2
GLUCOSE BLD-MCNC: 118 MG/DL (ref 74–99)
HBA1C MFR BLD: 6.5 % (ref 4–5.6)
IRON % SATURATION: 7 % (ref 15–50)
IRON: 25 UG/DL (ref 37–145)
POTASSIUM SERPL-SCNC: 4.9 MMOL/L (ref 3.5–5)
SODIUM BLD-SCNC: 131 MMOL/L (ref 132–146)
TOTAL IRON BINDING CAPACITY: 358 UG/DL (ref 250–450)
TOTAL PROTEIN: 8.3 G/DL (ref 6.4–8.3)

## 2024-08-22 PROCEDURE — 99213 OFFICE O/P EST LOW 20 MIN: CPT

## 2024-08-22 PROCEDURE — 3075F SYST BP GE 130 - 139MM HG: CPT

## 2024-08-22 PROCEDURE — 3079F DIAST BP 80-89 MM HG: CPT

## 2024-08-22 RX ORDER — ATORVASTATIN CALCIUM 40 MG/1
40 TABLET, FILM COATED ORAL DAILY
Qty: 90 TABLET | Refills: 0 | Status: SHIPPED | OUTPATIENT
Start: 2024-08-22 | End: 2024-11-20

## 2024-08-22 RX ORDER — SPIRONOLACTONE 25 MG/1
25 TABLET ORAL DAILY
Qty: 30 TABLET | Refills: 4 | Status: SHIPPED | OUTPATIENT
Start: 2024-08-22

## 2024-08-22 RX ORDER — METOPROLOL TARTRATE 50 MG/1
50 TABLET, FILM COATED ORAL 2 TIMES DAILY
Qty: 180 TABLET | Refills: 0 | Status: SHIPPED | OUTPATIENT
Start: 2024-08-22 | End: 2024-11-20

## 2024-08-22 RX ORDER — ESCITALOPRAM OXALATE 10 MG/1
10 TABLET ORAL DAILY
Qty: 30 TABLET | Refills: 0 | Status: SHIPPED | OUTPATIENT
Start: 2024-08-22 | End: 2024-09-21

## 2024-08-22 RX ORDER — ALBUTEROL SULFATE 90 UG/1
2 AEROSOL, METERED RESPIRATORY (INHALATION)
Qty: 1 EACH | Refills: 3 | Status: SHIPPED | OUTPATIENT
Start: 2024-08-22

## 2024-08-22 RX ORDER — AMLODIPINE BESYLATE 5 MG/1
5 TABLET ORAL DAILY
Qty: 30 TABLET | Refills: 1 | Status: SHIPPED
Start: 2024-08-22 | End: 2024-08-22

## 2024-08-22 RX ORDER — ATORVASTATIN CALCIUM 40 MG/1
40 TABLET, FILM COATED ORAL DAILY
Qty: 30 TABLET | Refills: 3 | Status: CANCELLED | OUTPATIENT
Start: 2024-08-22

## 2024-08-22 NOTE — TELEPHONE ENCOUNTER
Sushma called in and she is asking if she is suppose to continue her Lexapro.  It states on her after visit summary that she is to stop it and she thought that she was to continue the medication.    Please advise    Thank you

## 2024-08-22 NOTE — PATIENT INSTRUCTIONS
Dr. Dunaway (Pulmonology)   Address: [address] [place] [address]  Phone: [phone]    Call and schedule with Pulmonology.   Schedule with General Surgery for colonoscopy.

## 2024-08-22 NOTE — PROGRESS NOTES
Patient is a 54-year-old female presenting today for general follow-up.    Hyperlipidemia; last LDL was 207 in February 2024.  She has not been taking her Lipitor 40 mg, but notes she will start taking today.    Prediabetes; last A1c 5 months ago was 6.1.  She is not medicated.  She is due for repeat A1c today.    Microcytosis; last MCV 75.9.  Patient is open to screening colonoscopy and appropriate lab work to assess.  She denies any alarming symptoms.    Hypertension; blood pressure is controlled on Aldactone and Lopressor.  Patient is unsure why she is on Aldactone, but notes she is on the Lopressor due to tachycardia in the past.  She does not exercise and follows a general diet.  Patient is open to making medication adjustments.  She is also asymptomatic at this time.    Anxiety; patient has an extensive history anxiety, but it has worsened.  Patient lives at home alone and now does not leave her home to shop nor partake in regular daily activities.  Patient notes that she is so scared and feels like her mind is racing constantly that she just remains at home crying most days.  She denies any SI, HI, hallucinations, or auditory sounds heard.  She has support from her mother who drives her to places and shops for her.  Patient used to see Dr. Jackson in the past for counseling, but has not seen her in quite some time due to losing her insurance; however, her insurance has been reinstated.  Patient will make an appointment with Dr. Jackson.    Blood pressure 131/81, pulse 84, temperature 97.2 °F (36.2 °C), temperature source Temporal, resp. rate 18, height 1.676 m (5' 6\"), weight 101.2 kg (223 lb), SpO2 97%, not currently breastfeeding.     HEENT WNL     Heart regular    Lungs clear    abd non-tender      No edema    Pulses intact   Anxious state and tearful affect    Assessment and plan  Hypertension; will discontinue Aldactone and replace with amlodipine 10 mg.  Continue Lopressor 25 mg.  Hyperlipidemia; 
Aldactone until a full work up to find the root cause of her hypokalemia is done. She is asymptomatic at this time. Follows general diet. 3 bottles water daily. No exercise.      Hyperlipidemia: She is on Lipitor 40 mg; however, patient was not aware she should be taking it. Patient notes she will start taking again as prescribed. Last Lipid Panel 2024 with .    Prediabetes: Last A1C 6.1 5 months ago. Due for repeat A1C. She manages it via diet. She is not on any medications.     Microcytosis: Last labs with an MCV of 75.9. She denies any alarming symptoms. She is open to undergo lab work up and have her screening colonoscopy completed to further assess.     Anxiety: Patient reports she has an extensive history of anxiety that has been very difficult.  She currently is not medicated and she is not participating counseling due to lack of insurance for the last year and a half.  Patient notes that her anxiety has significantly worsened without counseling.  She has great difficulty performing activities of daily living including shopping, leaving her home, and even driving.  As result, she remains at home most days and allows her mother to do all the tasks such as shopping for her.  She prefers to stay home away from large crowds. Fear of getting sick and avoids public spaces.  Patient notes that she has an immense fear of something bad happening to her and describes her mind is being constantly on the go with racing thoughts.  Patient notes she is unable to compartmentalize her thoughts and think clearly.  She denies SI, HI and hallucinations.  To note, she does live home alone.  Patient also has Lexapro 10 mg at home and has not taken it.  ______________________________________________________________________________________________________________________  REVIEW OF SYSTEMS  All negative unless specified in the HPI.     PHYSICAL EXAM  /81 (Site: Right Upper Arm, Position: Sitting, Cuff Size: Large

## 2024-08-27 ENCOUNTER — TELEPHONE (OUTPATIENT)
Dept: FAMILY MEDICINE CLINIC | Age: 54
End: 2024-08-27

## 2024-08-27 DIAGNOSIS — J45.40 MODERATE PERSISTENT ASTHMA WITHOUT COMPLICATION: Primary | ICD-10-CM

## 2024-08-27 DIAGNOSIS — U09.9 CHRONIC POST-COVID-19 SYNDROME: ICD-10-CM

## 2024-08-27 RX ORDER — ALBUTEROL SULFATE 0.83 MG/ML
2.5 SOLUTION RESPIRATORY (INHALATION) 4 TIMES DAILY PRN
Qty: 120 EACH | Refills: 3 | Status: SHIPPED | OUTPATIENT
Start: 2024-08-27

## 2024-08-27 NOTE — TELEPHONE ENCOUNTER
Pharmacist called the  albuterol 0.5% needs a dilatant. However  the 0.083 is already in a diluted form.  Please advise.

## 2024-09-05 ENCOUNTER — OFFICE VISIT (OUTPATIENT)
Dept: PSYCHOLOGY | Age: 54
End: 2024-09-05
Payer: MEDICARE

## 2024-09-05 DIAGNOSIS — Z60.4 SOCIAL ISOLATION: ICD-10-CM

## 2024-09-05 DIAGNOSIS — U09.9 POST-COVID-19 SYNDROME MANIFESTING AS CHRONIC ANXIETY: ICD-10-CM

## 2024-09-05 DIAGNOSIS — Z59.41 FOOD INSECURITY: ICD-10-CM

## 2024-09-05 DIAGNOSIS — F41.9 POST-COVID-19 SYNDROME MANIFESTING AS CHRONIC ANXIETY: ICD-10-CM

## 2024-09-05 DIAGNOSIS — F33.2 MAJOR DEPRESSIVE DISORDER, RECURRENT EPISODE, SEVERE WITH ANXIOUS DISTRESS (HCC): Primary | ICD-10-CM

## 2024-09-05 PROCEDURE — 90791 PSYCH DIAGNOSTIC EVALUATION: CPT | Performed by: PSYCHOLOGIST

## 2024-09-05 SDOH — ECONOMIC STABILITY - FOOD INSECURITY: FOOD INSECURITY: Z59.41

## 2024-09-05 SDOH — SOCIAL STABILITY - SOCIAL INSECURITY: SOCIAL EXCLUSION AND REJECTION: Z60.4

## 2024-09-05 ASSESSMENT — PATIENT HEALTH QUESTIONNAIRE - PHQ9
3. TROUBLE FALLING OR STAYING ASLEEP: NEARLY EVERY DAY
7. TROUBLE CONCENTRATING ON THINGS, SUCH AS READING THE NEWSPAPER OR WATCHING TELEVISION: NEARLY EVERY DAY
2. FEELING DOWN, DEPRESSED OR HOPELESS: NEARLY EVERY DAY
1. LITTLE INTEREST OR PLEASURE IN DOING THINGS: NEARLY EVERY DAY
5. POOR APPETITE OR OVEREATING: NEARLY EVERY DAY
SUM OF ALL RESPONSES TO PHQ9 QUESTIONS 1 & 2: 6
SUM OF ALL RESPONSES TO PHQ QUESTIONS 1-9: 24
SUM OF ALL RESPONSES TO PHQ QUESTIONS 1-9: 24
4. FEELING TIRED OR HAVING LITTLE ENERGY: NEARLY EVERY DAY
9. THOUGHTS THAT YOU WOULD BE BETTER OFF DEAD, OR OF HURTING YOURSELF: NOT AT ALL
6. FEELING BAD ABOUT YOURSELF - OR THAT YOU ARE A FAILURE OR HAVE LET YOURSELF OR YOUR FAMILY DOWN: NEARLY EVERY DAY
10. IF YOU CHECKED OFF ANY PROBLEMS, HOW DIFFICULT HAVE THESE PROBLEMS MADE IT FOR YOU TO DO YOUR WORK, TAKE CARE OF THINGS AT HOME, OR GET ALONG WITH OTHER PEOPLE: EXTREMELY DIFFICULT
SUM OF ALL RESPONSES TO PHQ QUESTIONS 1-9: 24
SUM OF ALL RESPONSES TO PHQ QUESTIONS 1-9: 24
8. MOVING OR SPEAKING SO SLOWLY THAT OTHER PEOPLE COULD HAVE NOTICED. OR THE OPPOSITE, BEING SO FIGETY OR RESTLESS THAT YOU HAVE BEEN MOVING AROUND A LOT MORE THAN USUAL: NEARLY EVERY DAY

## 2024-09-05 ASSESSMENT — ANXIETY QUESTIONNAIRES
3. WORRYING TOO MUCH ABOUT DIFFERENT THINGS: NEARLY EVERY DAY
5. BEING SO RESTLESS THAT IT IS HARD TO SIT STILL: NEARLY EVERY DAY
GAD7 TOTAL SCORE: 21
IF YOU CHECKED OFF ANY PROBLEMS ON THIS QUESTIONNAIRE, HOW DIFFICULT HAVE THESE PROBLEMS MADE IT FOR YOU TO DO YOUR WORK, TAKE CARE OF THINGS AT HOME, OR GET ALONG WITH OTHER PEOPLE: EXTREMELY DIFFICULT
4. TROUBLE RELAXING: NEARLY EVERY DAY
2. NOT BEING ABLE TO STOP OR CONTROL WORRYING: NEARLY EVERY DAY
6. BECOMING EASILY ANNOYED OR IRRITABLE: NEARLY EVERY DAY
1. FEELING NERVOUS, ANXIOUS, OR ON EDGE: NEARLY EVERY DAY
7. FEELING AFRAID AS IF SOMETHING AWFUL MIGHT HAPPEN: NEARLY EVERY DAY
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
## 2024-09-05 NOTE — PATIENT INSTRUCTIONS
your sleep habits, and find opportunities for improvement.      Follow this link for a useful sleep chart:  [email or web address]-[email or web address]       TIPS TO COPE WITH STRESS    Stress can have negative effects on your physical and emotional health. Here are some tips to help you cope with stress:    RELAX. Find at least one activity that you find relaxing.  Try to avoid TV, computer, or smart devices for relaxation.  Do relaxation breathing and mental imagery.  Take slow breaths in through your nose and out through your mouth.  Imagine your favorite scent while inhaling, and blowing out a candle as you exhale.    2.   THINK ABOUT YOUR THINKING.  Thoughts that are overly negative or pessimistic can contribute to stress.  Avoid making assumptions.  Focus on the facts available to you.    3.    PROBLEM-SOLVE.  Feeling overwhelmed with daily hassles and problems can leave you feeling stressed.  Focus on solving the problems that are within your control.   Gather needed information on the problem, consider all possible solutions, and weigh the pros and cons of each option.      4.    GET PLENTY OF SLEEP.   Most of adults need 6-8 hours or more of sleep per night.  Wind down before bed. Avoid TV or smart devices in bed.  Keep a regular sleep schedule. Limit caffeine.      5.    EAT HEALTHY.  Limit fat, sugar, and sodium.  Eat a balanced diet of fruits, vegetables, whole grains, and lean protein.        6.    EXERCISE REGULARLY.  Talk to your doctor about a safe exercise program.        7.    AVOID DRUGS AND ALCOHOL.    While they may seem to help temporarily, drugs and alcohol will make existing stress worse.      8.    CONNECT WITH OTHERS.   Talk with friends or family about your concerns.  Socialize with others.      9.    TAKE A BREAK.   If you are feeling overwhelmed with commitments, evaluate what is most important.  What can

## 2024-09-06 PROBLEM — F41.9: Status: ACTIVE | Noted: 2024-09-06

## 2024-09-06 PROBLEM — Z59.41 FOOD INSECURITY: Status: ACTIVE | Noted: 2024-09-06

## 2024-09-06 PROBLEM — U09.9: Status: ACTIVE | Noted: 2024-09-06

## 2024-09-06 PROBLEM — F33.2 MAJOR DEPRESSIVE DISORDER, RECURRENT EPISODE, SEVERE WITH ANXIOUS DISTRESS (HCC): Status: ACTIVE | Noted: 2024-09-06

## 2024-09-06 PROBLEM — Z60.4 SOCIAL ISOLATION: Status: ACTIVE | Noted: 2024-09-06

## 2024-09-08 DIAGNOSIS — G89.4 CHRONIC PAIN SYNDROME: Primary | ICD-10-CM

## 2024-09-17 ENCOUNTER — TELEPHONE (OUTPATIENT)
Dept: FAMILY MEDICINE CLINIC | Age: 54
End: 2024-09-17

## 2024-09-17 NOTE — TELEPHONE ENCOUNTER
Patient cancelled due to problems with insurance. She would not elaborate, she wants to reschedule with you though. Thanks!

## 2024-10-28 ENCOUNTER — TELEPHONE (OUTPATIENT)
Dept: FAMILY MEDICINE CLINIC | Age: 54
End: 2024-10-28

## 2024-10-28 NOTE — TELEPHONE ENCOUNTER
Patient had canceled 9/17/24 appointment due to an insurance issue.  Had called back at that time per her request, LVM. No response as of today.  Called today to attempt re-establishment of services and finalize referrals we had discussed ([facility]), no answer.  LVM for her to call back.

## 2024-12-09 DIAGNOSIS — Z76.0 MEDICATION REFILL: ICD-10-CM

## 2024-12-09 DIAGNOSIS — I10 ESSENTIAL HYPERTENSION: ICD-10-CM

## 2024-12-09 RX ORDER — METOPROLOL TARTRATE 50 MG
50 TABLET ORAL 2 TIMES DAILY
Qty: 180 TABLET | Refills: 0 | Status: SHIPPED | OUTPATIENT
Start: 2024-12-09

## 2024-12-09 NOTE — TELEPHONE ENCOUNTER
Name of Medication(s) Requested:  Requested Prescriptions     Pending Prescriptions Disp Refills    metoprolol tartrate (LOPRESSOR) 50 MG tablet [Pharmacy Med Name: METOPROLOL TARTRATE 50MG TABLETS] 180 tablet 0     Sig: TAKE 1 TABLET BY MOUTH TWICE DAILY       Medication is on current medication list Yes    Dosage and directions were verified? Yes    Quantity verified: 90 day supply     Pharmacy Verified?  Yes    Last Appointment:  8/22/2024    Future appts:  Future Appointments   Date Time Provider Department Center   12/27/2024  1:20 PM Lucas Garrett MD [facility]        (If no appt send self scheduling link. .REFILLAPPT)  Scheduling request sent?     [] Yes  [x] No    Does patient need updated?  [] Yes  [x] No

## 2025-01-19 DIAGNOSIS — Z76.0 MEDICATION REFILL: ICD-10-CM

## 2025-01-19 DIAGNOSIS — I10 ESSENTIAL HYPERTENSION: ICD-10-CM

## 2025-01-20 RX ORDER — SPIRONOLACTONE 25 MG/1
25 TABLET ORAL DAILY
Qty: 90 TABLET | Refills: 1 | Status: SHIPPED | OUTPATIENT
Start: 2025-01-20

## 2025-01-20 NOTE — TELEPHONE ENCOUNTER
Name of Medication(s) Requested:  Requested Prescriptions     Pending Prescriptions Disp Refills    spironolactone (ALDACTONE) 25 MG tablet [Pharmacy Med Name: SPIRONOLACTONE 25MG TABLETS] 30 tablet 4     Sig: TAKE 1 TABLET BY MOUTH DAILY       Medication is on current medication list Yes    Dosage and directions were verified? Yes    Quantity verified: 30 day supply     Pharmacy Verified?  Yes    Last Appointment:  8/22/2024    Future appts:  Future Appointments   Date Time Provider Department Center   1/20/2025  2:00 PM Savita Jackson, PhD [facility]am  [facility]        (If no appt send self scheduling link. .REFILLAPPT)  Scheduling request sent?     [] Yes  [x] No    Does patient need updated?  [] Yes  [x] No

## 2025-03-20 DIAGNOSIS — Z76.0 MEDICATION REFILL: ICD-10-CM

## 2025-03-20 DIAGNOSIS — I10 ESSENTIAL HYPERTENSION: ICD-10-CM

## 2025-03-20 RX ORDER — METOPROLOL TARTRATE 50 MG
50 TABLET ORAL 2 TIMES DAILY
Qty: 180 TABLET | Refills: 0 | Status: SHIPPED | OUTPATIENT
Start: 2025-03-20

## 2025-03-20 NOTE — TELEPHONE ENCOUNTER
Name of Medication(s) Requested:  Requested Prescriptions     Pending Prescriptions Disp Refills    metoprolol tartrate (LOPRESSOR) 50 MG tablet [Pharmacy Med Name: METOPROLOL TARTRATE 50MG TABLETS] 180 tablet 0     Sig: TAKE 1 TABLET BY MOUTH TWICE DAILY       Medication is on current medication list Yes    Dosage and directions were verified? Yes    Quantity verified: 90 day supply     Pharmacy Verified?  Yes    Last Appointment:  8/22/2024    Future appts:  No future appointments.     (If no appt send self scheduling link. .REFILLAPPT)  Scheduling request sent?     [] Yes  [x] No    Does patient need updated?  [] Yes  [x] No